# Patient Record
Sex: MALE | Race: WHITE | NOT HISPANIC OR LATINO | URBAN - METROPOLITAN AREA
[De-identification: names, ages, dates, MRNs, and addresses within clinical notes are randomized per-mention and may not be internally consistent; named-entity substitution may affect disease eponyms.]

---

## 2023-03-13 DIAGNOSIS — F41.1 GENERALIZED ANXIETY DISORDER: Primary | ICD-10-CM

## 2023-03-13 RX ORDER — CLONAZEPAM 0.5 MG/1
0.5 TABLET ORAL EVERY 12 HOURS PRN
COMMUNITY
End: 2023-03-13 | Stop reason: SDUPTHER

## 2023-03-14 RX ORDER — CLONAZEPAM 0.5 MG/1
0.5 TABLET ORAL EVERY 12 HOURS PRN
Qty: 30 TABLET | Refills: 1 | Status: SHIPPED | OUTPATIENT
Start: 2023-03-14 | End: 2023-05-09 | Stop reason: SDUPTHER

## 2023-03-28 ENCOUNTER — TELEPHONE (OUTPATIENT)
Dept: PRIMARY CARE | Facility: CLINIC | Age: 73
End: 2023-03-28
Payer: MEDICARE

## 2023-03-28 NOTE — TELEPHONE ENCOUNTER
Pt had an ultrasound  that showed kidney stone and polyps. He nephrologist on last Thursday. She offered to refer him to someone to follow up on these results. He declined this referral because he wasn't happy with her because she didn't review the result until he asked about them (Another Dr. Ordered them ). He wants a referral to a different nephr and a referral to a dr. To address the finding on the ultrasound.

## 2023-04-13 ENCOUNTER — OFFICE VISIT (OUTPATIENT)
Dept: PRIMARY CARE | Facility: CLINIC | Age: 73
End: 2023-04-13
Payer: MEDICARE

## 2023-04-13 VITALS
WEIGHT: 190 LBS | HEART RATE: 65 BPM | OXYGEN SATURATION: 97 % | BODY MASS INDEX: 28.14 KG/M2 | TEMPERATURE: 97.1 F | DIASTOLIC BLOOD PRESSURE: 62 MMHG | HEIGHT: 69 IN | SYSTOLIC BLOOD PRESSURE: 122 MMHG

## 2023-04-13 DIAGNOSIS — F41.1 GENERALIZED ANXIETY DISORDER WITH PANIC ATTACKS: Primary | ICD-10-CM

## 2023-04-13 DIAGNOSIS — I47.9 TACHYCARDIA, PAROXYSMAL (MULTI): ICD-10-CM

## 2023-04-13 DIAGNOSIS — F32.1 DEPRESSION, MAJOR, SINGLE EPISODE, MODERATE (MULTI): ICD-10-CM

## 2023-04-13 DIAGNOSIS — F41.0 GENERALIZED ANXIETY DISORDER WITH PANIC ATTACKS: Primary | ICD-10-CM

## 2023-04-13 DIAGNOSIS — N18.31 STAGE 3A CHRONIC KIDNEY DISEASE (MULTI): ICD-10-CM

## 2023-04-13 PROBLEM — J40 BRONCHITIS: Status: ACTIVE | Noted: 2023-04-13

## 2023-04-13 PROBLEM — I47.10 SVT (SUPRAVENTRICULAR TACHYCARDIA) (CMS-HCC): Status: ACTIVE | Noted: 2023-04-13

## 2023-04-13 PROBLEM — I10 HTN (HYPERTENSION), BENIGN: Status: ACTIVE | Noted: 2018-02-15

## 2023-04-13 PROBLEM — Z99.89 CPAP (CONTINUOUS POSITIVE AIRWAY PRESSURE) DEPENDENCE: Status: ACTIVE | Noted: 2018-10-11

## 2023-04-13 PROBLEM — N18.2 CKD (CHRONIC KIDNEY DISEASE), STAGE II: Status: ACTIVE | Noted: 2023-04-13

## 2023-04-13 PROBLEM — N28.9 RENAL INSUFFICIENCY: Status: ACTIVE | Noted: 2018-02-15

## 2023-04-13 PROBLEM — F32.5 DEPRESSION, MAJOR, IN REMISSION (CMS-HCC): Status: ACTIVE | Noted: 2023-04-13

## 2023-04-13 PROBLEM — H02.9 BENIGN LESION OF EYELID: Status: ACTIVE | Noted: 2018-10-11

## 2023-04-13 PROBLEM — I35.1 AORTIC VALVE REGURGITATION, ACQUIRED: Status: ACTIVE | Noted: 2023-04-13

## 2023-04-13 PROBLEM — M43.16 SPONDYLOLISTHESIS OF LUMBAR REGION: Status: ACTIVE | Noted: 2020-06-18

## 2023-04-13 PROBLEM — F98.8 ATTENTION DEFICIT DISORDER: Status: ACTIVE | Noted: 2018-02-15

## 2023-04-13 PROBLEM — H40.1131 PRIMARY OPEN ANGLE GLAUCOMA (POAG) OF BOTH EYES, MILD STAGE: Status: ACTIVE | Noted: 2018-06-19

## 2023-04-13 PROBLEM — H25.813 COMBINED FORMS OF AGE-RELATED CATARACT OF BOTH EYES: Status: ACTIVE | Noted: 2018-10-11

## 2023-04-13 PROBLEM — S63.619A: Status: ACTIVE | Noted: 2018-05-09

## 2023-04-13 PROBLEM — S82.409A CLOSED FIBULAR FRACTURE: Status: ACTIVE | Noted: 2023-04-13

## 2023-04-13 PROBLEM — R00.0 TACHYCARDIA: Status: ACTIVE | Noted: 2023-04-13

## 2023-04-13 PROBLEM — R94.31 ABNORMAL EKG: Status: ACTIVE | Noted: 2023-04-13

## 2023-04-13 PROBLEM — H25.13 NUCLEAR SENILE CATARACT OF BOTH EYES: Status: ACTIVE | Noted: 2020-12-21

## 2023-04-13 PROBLEM — H52.13 MYOPIA, BILATERAL: Status: ACTIVE | Noted: 2023-01-19

## 2023-04-13 PROBLEM — J30.89 ENVIRONMENTAL AND SEASONAL ALLERGIES: Status: ACTIVE | Noted: 2019-03-12

## 2023-04-13 PROBLEM — M79.642 LEFT HAND PAIN: Status: ACTIVE | Noted: 2018-06-22

## 2023-04-13 PROBLEM — R39.9 UTI SYMPTOMS: Status: ACTIVE | Noted: 2023-04-13

## 2023-04-13 PROBLEM — G47.33 OSA ON CPAP: Status: ACTIVE | Noted: 2018-02-15

## 2023-04-13 PROBLEM — S82.62XD: Status: ACTIVE | Noted: 2022-12-16

## 2023-04-13 PROCEDURE — 1159F MED LIST DOCD IN RCRD: CPT | Performed by: INTERNAL MEDICINE

## 2023-04-13 PROCEDURE — 99213 OFFICE O/P EST LOW 20 MIN: CPT | Performed by: INTERNAL MEDICINE

## 2023-04-13 PROCEDURE — 3074F SYST BP LT 130 MM HG: CPT | Performed by: INTERNAL MEDICINE

## 2023-04-13 PROCEDURE — 1160F RVW MEDS BY RX/DR IN RCRD: CPT | Performed by: INTERNAL MEDICINE

## 2023-04-13 PROCEDURE — 3078F DIAST BP <80 MM HG: CPT | Performed by: INTERNAL MEDICINE

## 2023-04-13 RX ORDER — LISINOPRIL 20 MG/1
20 TABLET ORAL
COMMUNITY
End: 2023-08-01 | Stop reason: ALTCHOICE

## 2023-04-13 RX ORDER — AMLODIPINE BESYLATE 2.5 MG/1
2.5 TABLET ORAL DAILY
COMMUNITY
Start: 2016-03-04 | End: 2023-07-25 | Stop reason: ALTCHOICE

## 2023-04-13 RX ORDER — ASPIRIN 81 MG/1
81 TABLET ORAL DAILY
COMMUNITY
Start: 2023-02-21 | End: 2023-11-11 | Stop reason: WASHOUT

## 2023-04-13 RX ORDER — CETIRIZINE HYDROCHLORIDE 10 MG/1
10 TABLET ORAL DAILY
COMMUNITY
Start: 2023-02-21 | End: 2023-11-11 | Stop reason: ALTCHOICE

## 2023-04-13 RX ORDER — LISINOPRIL 30 MG/1
30 TABLET ORAL DAILY
COMMUNITY
Start: 2019-03-21 | End: 2023-08-01 | Stop reason: SDUPTHER

## 2023-04-13 RX ORDER — ASCORBIC ACID 500 MG
500 TABLET ORAL DAILY
COMMUNITY
Start: 2023-02-21 | End: 2023-11-11 | Stop reason: ALTCHOICE

## 2023-04-13 RX ORDER — GABAPENTIN 100 MG/1
100 CAPSULE ORAL
COMMUNITY
Start: 2022-10-05 | End: 2023-07-25 | Stop reason: ALTCHOICE

## 2023-04-13 RX ORDER — DEXTROAMPHETAMINE SACCHARATE, AMPHETAMINE ASPARTATE, DEXTROAMPHETAMINE SULFATE AND AMPHETAMINE SULFATE 7.5; 7.5; 7.5; 7.5 MG/1; MG/1; MG/1; MG/1
30 TABLET ORAL DAILY
COMMUNITY
Start: 2022-10-11 | End: 2023-04-13 | Stop reason: SINTOL

## 2023-04-13 RX ORDER — METOPROLOL SUCCINATE 25 MG/1
25 TABLET, EXTENDED RELEASE ORAL DAILY
COMMUNITY
Start: 2023-02-21 | End: 2024-02-06 | Stop reason: SDUPTHER

## 2023-04-13 RX ORDER — TRAZODONE HYDROCHLORIDE 150 MG/1
150 TABLET ORAL NIGHTLY
COMMUNITY
Start: 2022-05-05 | End: 2024-02-06 | Stop reason: SDUPTHER

## 2023-04-13 RX ORDER — LATANOPROST 50 UG/ML
1 SOLUTION/ DROPS OPHTHALMIC DAILY
COMMUNITY
Start: 2022-03-14

## 2023-04-13 RX ORDER — BUPROPION HYDROCHLORIDE 150 MG/1
150 TABLET ORAL
COMMUNITY
End: 2023-07-25 | Stop reason: ALTCHOICE

## 2023-04-13 RX ORDER — OMEGA-3/DHA/EPA/FISH OIL 300-1000MG
CAPSULE,DELAYED RELEASE (ENTERIC COATED) ORAL
COMMUNITY
Start: 2023-02-21 | End: 2023-11-11 | Stop reason: WASHOUT

## 2023-04-13 RX ORDER — SILDENAFIL CITRATE 20 MG/1
20 TABLET ORAL
COMMUNITY
Start: 2018-10-30 | End: 2024-03-25 | Stop reason: ALTCHOICE

## 2023-04-13 NOTE — PROGRESS NOTES
"Subjective   Patient ID: Paul Prather is a 72 y.o. male who presents for Follow-up.    HPI follow up add, nilsa, pat, ckd, dep, nephrolithiasis, htn, ed    Review of Systems  No cardiac sxs. Per pt, cardio said ok to take add rx. No letter available. Pt getting by w/o but feels was dramatically better on meds. Depression ok. Had breakdown in dpr w/ nephrologist and would like referral to new doctor. No kidney stone sxs.  Objective   /62   Pulse 65   Temp 36.2 °C (97.1 °F)   Ht 1.753 m (5' 9\")   Wt 86.2 kg (190 lb)   SpO2 97%   BMI 28.06 kg/m²     Physical Exam  GEN nad, Affect wnl  Heent ncat, eomfg, face symmetric  Skin good color  Resp breathing easily  No p/m retardation or agitation  Thinking appropriate  Oriented    Assessment/Plan   Generalized anxiety disorder with panic attacks  Stage 3a chronic kidney disease  Depression, major, single episode, moderate (CMS/HCC)  Tachycardia, paroxysmal (CMS/HCC)    Pull cardio note for review  Discussed reluctance to give add meds  Follow up based on cardio note or 3 mos     "

## 2023-04-18 ENCOUNTER — TELEPHONE (OUTPATIENT)
Dept: PRIMARY CARE | Facility: CLINIC | Age: 73
End: 2023-04-18
Payer: MEDICARE

## 2023-04-18 NOTE — TELEPHONE ENCOUNTER
----- Message from Aayush Lopez MD sent at 4/14/2023 11:19 AM EDT -----  SABRINA ogden  ----- Message -----  From: Mai Santos MA  Sent: 4/13/2023   5:08 PM EDT  To: Aayush Lopez MD    Who?   ----- Message -----  From: Aayush Lopez MD  Sent: 4/13/2023   4:58 PM EDT  To: KECIA Rouse ramicone note cardio please

## 2023-05-02 ENCOUNTER — TELEPHONE (OUTPATIENT)
Dept: PRIMARY CARE | Facility: CLINIC | Age: 73
End: 2023-05-02
Payer: MEDICARE

## 2023-05-02 NOTE — TELEPHONE ENCOUNTER
Pt is requesting a refill on his add meds. He said you discontinued it because you were concerned about his heart but that has been resolved so he would like to restart it. He's looking for the dosing of 1 every morning.

## 2023-05-04 DIAGNOSIS — F98.8 ATTENTION DEFICIT DISORDER, UNSPECIFIED HYPERACTIVITY PRESENCE: Primary | ICD-10-CM

## 2023-05-04 RX ORDER — DEXTROAMPHETAMINE SACCHARATE, AMPHETAMINE ASPARTATE MONOHYDRATE, DEXTROAMPHETAMINE SULFATE AND AMPHETAMINE SULFATE 1.25; 1.25; 1.25; 1.25 MG/1; MG/1; MG/1; MG/1
5 CAPSULE, EXTENDED RELEASE ORAL EVERY MORNING
Qty: 30 CAPSULE | Refills: 0 | Status: SHIPPED | OUTPATIENT
Start: 2023-05-04 | End: 2023-06-02 | Stop reason: SDUPTHER

## 2023-05-04 NOTE — PROGRESS NOTES
Cardio letter reviewed  They ok'd amphet/dext  Will start low dose xr at pt's persistent urging  Have multiple times discussed risks  Pt accepts  Also will have pt clear w/ eye doctor bf taking

## 2023-05-09 DIAGNOSIS — F41.1 GENERALIZED ANXIETY DISORDER: ICD-10-CM

## 2023-05-09 RX ORDER — CLONAZEPAM 0.5 MG/1
0.5 TABLET ORAL EVERY 12 HOURS PRN
Qty: 30 TABLET | Refills: 0 | Status: SHIPPED | OUTPATIENT
Start: 2023-05-09 | End: 2023-06-07 | Stop reason: SDUPTHER

## 2023-05-18 DIAGNOSIS — F98.8 ATTENTION DEFICIT DISORDER, UNSPECIFIED HYPERACTIVITY PRESENCE: ICD-10-CM

## 2023-05-18 DIAGNOSIS — F41.1 GENERALIZED ANXIETY DISORDER: ICD-10-CM

## 2023-05-18 RX ORDER — VENLAFAXINE HYDROCHLORIDE 150 MG/1
1 CAPSULE, EXTENDED RELEASE ORAL DAILY
COMMUNITY
Start: 2023-02-01 | End: 2023-06-02 | Stop reason: SDUPTHER

## 2023-05-19 RX ORDER — CLONAZEPAM 0.5 MG/1
0.5 TABLET ORAL EVERY 12 HOURS PRN
Qty: 30 TABLET | Refills: 0 | OUTPATIENT
Start: 2023-05-19

## 2023-05-19 RX ORDER — DEXTROAMPHETAMINE SACCHARATE, AMPHETAMINE ASPARTATE MONOHYDRATE, DEXTROAMPHETAMINE SULFATE AND AMPHETAMINE SULFATE 1.25; 1.25; 1.25; 1.25 MG/1; MG/1; MG/1; MG/1
5 CAPSULE, EXTENDED RELEASE ORAL EVERY MORNING
Qty: 30 CAPSULE | Refills: 0 | OUTPATIENT
Start: 2023-05-19 | End: 2023-06-18

## 2023-05-19 RX ORDER — VENLAFAXINE HYDROCHLORIDE 150 MG/1
150 CAPSULE, EXTENDED RELEASE ORAL DAILY
Qty: 90 CAPSULE | Refills: 3 | OUTPATIENT
Start: 2023-05-19

## 2023-06-02 DIAGNOSIS — F98.8 ATTENTION DEFICIT DISORDER, UNSPECIFIED HYPERACTIVITY PRESENCE: ICD-10-CM

## 2023-06-02 DIAGNOSIS — F41.9 ANXIETY AND DEPRESSION: Primary | ICD-10-CM

## 2023-06-02 DIAGNOSIS — F32.A ANXIETY AND DEPRESSION: Primary | ICD-10-CM

## 2023-06-02 RX ORDER — VENLAFAXINE HYDROCHLORIDE 150 MG/1
150 CAPSULE, EXTENDED RELEASE ORAL DAILY
Qty: 90 CAPSULE | Refills: 3 | Status: SHIPPED | OUTPATIENT
Start: 2023-06-02 | End: 2023-06-06 | Stop reason: SDUPTHER

## 2023-06-02 RX ORDER — DEXTROAMPHETAMINE SACCHARATE, AMPHETAMINE ASPARTATE MONOHYDRATE, DEXTROAMPHETAMINE SULFATE AND AMPHETAMINE SULFATE 1.25; 1.25; 1.25; 1.25 MG/1; MG/1; MG/1; MG/1
5 CAPSULE, EXTENDED RELEASE ORAL EVERY MORNING
Qty: 30 CAPSULE | Refills: 0 | Status: SHIPPED | OUTPATIENT
Start: 2023-06-02 | End: 2023-06-07 | Stop reason: SDUPTHER

## 2023-06-06 DIAGNOSIS — F32.A ANXIETY AND DEPRESSION: ICD-10-CM

## 2023-06-06 DIAGNOSIS — F41.9 ANXIETY AND DEPRESSION: ICD-10-CM

## 2023-06-06 DIAGNOSIS — F98.8 ATTENTION DEFICIT DISORDER, UNSPECIFIED HYPERACTIVITY PRESENCE: ICD-10-CM

## 2023-06-06 DIAGNOSIS — F41.1 GENERALIZED ANXIETY DISORDER: ICD-10-CM

## 2023-06-07 DIAGNOSIS — F98.8 ATTENTION DEFICIT DISORDER, UNSPECIFIED HYPERACTIVITY PRESENCE: ICD-10-CM

## 2023-06-07 RX ORDER — VENLAFAXINE HYDROCHLORIDE 150 MG/1
150 CAPSULE, EXTENDED RELEASE ORAL DAILY
Qty: 90 CAPSULE | Refills: 3 | Status: SHIPPED | OUTPATIENT
Start: 2023-06-07 | End: 2024-06-03 | Stop reason: SDUPTHER

## 2023-06-07 RX ORDER — DEXTROAMPHETAMINE SACCHARATE, AMPHETAMINE ASPARTATE MONOHYDRATE, DEXTROAMPHETAMINE SULFATE AND AMPHETAMINE SULFATE 1.25; 1.25; 1.25; 1.25 MG/1; MG/1; MG/1; MG/1
5 CAPSULE, EXTENDED RELEASE ORAL EVERY MORNING
Qty: 30 CAPSULE | Refills: 0 | Status: SHIPPED | OUTPATIENT
Start: 2023-06-07 | End: 2023-07-10 | Stop reason: SDUPTHER

## 2023-06-07 RX ORDER — CLONAZEPAM 0.5 MG/1
0.5 TABLET ORAL EVERY 12 HOURS PRN
Qty: 30 TABLET | Refills: 0 | Status: SHIPPED | OUTPATIENT
Start: 2023-06-07 | End: 2023-07-10 | Stop reason: SDUPTHER

## 2023-06-21 ENCOUNTER — TELEPHONE (OUTPATIENT)
Dept: PRIMARY CARE | Facility: CLINIC | Age: 73
End: 2023-06-21
Payer: MEDICARE

## 2023-06-21 DIAGNOSIS — M25.569 KNEE PAIN, UNSPECIFIED CHRONICITY, UNSPECIFIED LATERALITY: Primary | ICD-10-CM

## 2023-07-10 DIAGNOSIS — F98.8 ATTENTION DEFICIT DISORDER, UNSPECIFIED HYPERACTIVITY PRESENCE: ICD-10-CM

## 2023-07-10 DIAGNOSIS — F41.1 GENERALIZED ANXIETY DISORDER: ICD-10-CM

## 2023-07-10 RX ORDER — DEXTROAMPHETAMINE SACCHARATE, AMPHETAMINE ASPARTATE MONOHYDRATE, DEXTROAMPHETAMINE SULFATE AND AMPHETAMINE SULFATE 1.25; 1.25; 1.25; 1.25 MG/1; MG/1; MG/1; MG/1
5 CAPSULE, EXTENDED RELEASE ORAL EVERY MORNING
Qty: 30 CAPSULE | Refills: 0 | Status: SHIPPED | OUTPATIENT
Start: 2023-07-10 | End: 2023-08-07 | Stop reason: SDUPTHER

## 2023-07-10 RX ORDER — CLONAZEPAM 0.5 MG/1
0.5 TABLET ORAL EVERY 12 HOURS PRN
Qty: 30 TABLET | Refills: 0 | Status: SHIPPED | OUTPATIENT
Start: 2023-07-10 | End: 2023-08-07 | Stop reason: SDUPTHER

## 2023-07-25 ENCOUNTER — OFFICE VISIT (OUTPATIENT)
Dept: PRIMARY CARE | Facility: CLINIC | Age: 73
End: 2023-07-25
Payer: MEDICARE

## 2023-07-25 VITALS
HEIGHT: 69 IN | OXYGEN SATURATION: 96 % | TEMPERATURE: 97.3 F | WEIGHT: 191 LBS | DIASTOLIC BLOOD PRESSURE: 72 MMHG | BODY MASS INDEX: 28.29 KG/M2 | HEART RATE: 71 BPM | SYSTOLIC BLOOD PRESSURE: 122 MMHG

## 2023-07-25 DIAGNOSIS — Z00.00 ROUTINE GENERAL MEDICAL EXAMINATION AT HEALTH CARE FACILITY: Primary | ICD-10-CM

## 2023-07-25 DIAGNOSIS — R53.83 OTHER FATIGUE: ICD-10-CM

## 2023-07-25 DIAGNOSIS — Z12.5 SCREENING PSA (PROSTATE SPECIFIC ANTIGEN): ICD-10-CM

## 2023-07-25 DIAGNOSIS — M54.30 SCIATICA, UNSPECIFIED LATERALITY: ICD-10-CM

## 2023-07-25 DIAGNOSIS — M25.569 KNEE PAIN, UNSPECIFIED CHRONICITY, UNSPECIFIED LATERALITY: ICD-10-CM

## 2023-07-25 DIAGNOSIS — N18.2 CKD (CHRONIC KIDNEY DISEASE), STAGE II: ICD-10-CM

## 2023-07-25 DIAGNOSIS — M54.9 BACK PAIN, UNSPECIFIED BACK LOCATION, UNSPECIFIED BACK PAIN LATERALITY, UNSPECIFIED CHRONICITY: ICD-10-CM

## 2023-07-25 PROBLEM — M17.12 ARTHRITIS OF KNEE, LEFT: Status: RESOLVED | Noted: 2023-07-25 | Resolved: 2023-07-25

## 2023-07-25 PROBLEM — S83.242A TEAR OF MEDIAL MENISCUS OF LEFT KNEE, CURRENT: Status: RESOLVED | Noted: 2023-07-25 | Resolved: 2023-07-25

## 2023-07-25 PROCEDURE — G0439 PPPS, SUBSEQ VISIT: HCPCS | Performed by: INTERNAL MEDICINE

## 2023-07-25 PROCEDURE — 99214 OFFICE O/P EST MOD 30 MIN: CPT | Performed by: INTERNAL MEDICINE

## 2023-07-25 PROCEDURE — 3078F DIAST BP <80 MM HG: CPT | Performed by: INTERNAL MEDICINE

## 2023-07-25 PROCEDURE — 1170F FXNL STATUS ASSESSED: CPT | Performed by: INTERNAL MEDICINE

## 2023-07-25 PROCEDURE — 1159F MED LIST DOCD IN RCRD: CPT | Performed by: INTERNAL MEDICINE

## 2023-07-25 PROCEDURE — 1160F RVW MEDS BY RX/DR IN RCRD: CPT | Performed by: INTERNAL MEDICINE

## 2023-07-25 PROCEDURE — 3074F SYST BP LT 130 MM HG: CPT | Performed by: INTERNAL MEDICINE

## 2023-07-25 RX ORDER — FLUTICASONE PROPIONATE 50 MCG
1 SPRAY, SUSPENSION (ML) NASAL DAILY
COMMUNITY

## 2023-07-25 RX ORDER — LATANOPROST 50 UG/ML
1 SOLUTION/ DROPS OPHTHALMIC
COMMUNITY
Start: 2022-03-14 | End: 2024-03-25 | Stop reason: SDUPTHER

## 2023-07-25 ASSESSMENT — ACTIVITIES OF DAILY LIVING (ADL)
DOING_HOUSEWORK: INDEPENDENT
DRESSING: INDEPENDENT
TAKING_MEDICATION: INDEPENDENT
GROCERY_SHOPPING: INDEPENDENT
MANAGING_FINANCES: INDEPENDENT
BATHING: INDEPENDENT

## 2023-07-25 ASSESSMENT — ENCOUNTER SYMPTOMS
LOSS OF SENSATION IN FEET: 0
OCCASIONAL FEELINGS OF UNSTEADINESS: 0
DEPRESSION: 0

## 2023-07-25 ASSESSMENT — PATIENT HEALTH QUESTIONNAIRE - PHQ9
SUM OF ALL RESPONSES TO PHQ9 QUESTIONS 1 AND 2: 0
2. FEELING DOWN, DEPRESSED OR HOPELESS: NOT AT ALL
1. LITTLE INTEREST OR PLEASURE IN DOING THINGS: NOT AT ALL

## 2023-07-25 NOTE — PROGRESS NOTES
"Subjective   Reason for Visit: Paul Prather is an 73 y.o. male here for a Medicare Wellness visit.     Past Medical, Surgical, and Family History reviewed and updated in chart.    Reviewed all medications by prescribing practitioner or clinical pharmacist (such as prescriptions, OTCs, herbal therapies and supplements) and documented in the medical record.    HPI multiple issues. Left knee pain x 5 weeks. Occasional edema. Left > right. C/o fatigue. Some low back pain and left foot intermittent paresthesias. Follow up htn, nilsa, add    Patient Care Team:  Aayush Lopez MD as PCP - General  Aayush Lopez MD as PCP - [employer] PCP     Review of Systems  As per [facility]    Objective   Vitals:  /72   Pulse 71   Temp 36.3 °C (97.3 °F)   Ht 1.753 m (5' 9\")   Wt 86.6 kg (191 lb)   SpO2 96%   BMI 28.21 kg/m²       Physical Exam  Gen nad, affect wnl  Heentt eomfg, face symmetric, ncat  Neck w/o la, tm, bruit  Lungs clear   Cv rrr nl s1, s2  Ext w/o edema  Neuro grossly nonfocal  Skin good color  Knee rom ok. Ligaments ok. Some medial jt line tenderness    Assessment/Plan   Medicare wellness  Sciatica  Knee pain  Htn  Edema. Minimal  Fatigue    Lab  Refer back ortho for knee  Refer p.t. sciatica  Follow up 3 mos                 "

## 2023-08-01 DIAGNOSIS — I10 HTN (HYPERTENSION), BENIGN: Primary | ICD-10-CM

## 2023-08-01 RX ORDER — LISINOPRIL 30 MG/1
30 TABLET ORAL DAILY
Qty: 90 TABLET | Refills: 3 | Status: SHIPPED | OUTPATIENT
Start: 2023-08-01

## 2023-08-07 ENCOUNTER — LAB (OUTPATIENT)
Dept: LAB | Facility: LAB | Age: 73
End: 2023-08-07
Payer: MEDICARE

## 2023-08-07 DIAGNOSIS — M54.30 SCIATICA, UNSPECIFIED LATERALITY: ICD-10-CM

## 2023-08-07 DIAGNOSIS — M54.9 BACK PAIN, UNSPECIFIED BACK LOCATION, UNSPECIFIED BACK PAIN LATERALITY, UNSPECIFIED CHRONICITY: ICD-10-CM

## 2023-08-07 DIAGNOSIS — F41.1 GENERALIZED ANXIETY DISORDER: ICD-10-CM

## 2023-08-07 DIAGNOSIS — R53.83 OTHER FATIGUE: ICD-10-CM

## 2023-08-07 DIAGNOSIS — Z12.5 SCREENING PSA (PROSTATE SPECIFIC ANTIGEN): ICD-10-CM

## 2023-08-07 DIAGNOSIS — F98.8 ATTENTION DEFICIT DISORDER, UNSPECIFIED HYPERACTIVITY PRESENCE: ICD-10-CM

## 2023-08-07 PROCEDURE — 85025 COMPLETE CBC W/AUTO DIFF WBC: CPT

## 2023-08-07 PROCEDURE — 36415 COLL VENOUS BLD VENIPUNCTURE: CPT

## 2023-08-07 PROCEDURE — 84165 PROTEIN E-PHORESIS SERUM: CPT | Performed by: PATHOLOGY

## 2023-08-07 PROCEDURE — 84443 ASSAY THYROID STIM HORMONE: CPT

## 2023-08-07 PROCEDURE — 82607 VITAMIN B-12: CPT

## 2023-08-07 PROCEDURE — 80053 COMPREHEN METABOLIC PANEL: CPT

## 2023-08-07 PROCEDURE — 84165 PROTEIN E-PHORESIS SERUM: CPT

## 2023-08-07 PROCEDURE — G0103 PSA SCREENING: HCPCS

## 2023-08-07 PROCEDURE — 81001 URINALYSIS AUTO W/SCOPE: CPT

## 2023-08-08 LAB
ALANINE AMINOTRANSFERASE (SGPT) (U/L) IN SER/PLAS: 25 U/L (ref 10–52)
ALBUMIN (G/DL) IN SER/PLAS: 4.2 G/DL (ref 3.4–5)
ALKALINE PHOSPHATASE (U/L) IN SER/PLAS: 71 U/L (ref 33–136)
ANION GAP IN SER/PLAS: 13 MMOL/L (ref 10–20)
APPEARANCE, URINE: CLEAR
ASPARTATE AMINOTRANSFERASE (SGOT) (U/L) IN SER/PLAS: 25 U/L (ref 9–39)
BASOPHILS (10*3/UL) IN BLOOD BY AUTOMATED COUNT: 0.02 X10E9/L (ref 0–0.1)
BASOPHILS/100 LEUKOCYTES IN BLOOD BY AUTOMATED COUNT: 0.2 % (ref 0–2)
BILIRUBIN TOTAL (MG/DL) IN SER/PLAS: 0.4 MG/DL (ref 0–1.2)
BILIRUBIN, URINE: NEGATIVE
BLOOD, URINE: NEGATIVE
CALCIUM (MG/DL) IN SER/PLAS: 9.4 MG/DL (ref 8.6–10.6)
CARBON DIOXIDE, TOTAL (MMOL/L) IN SER/PLAS: 29 MMOL/L (ref 21–32)
CHLORIDE (MMOL/L) IN SER/PLAS: 104 MMOL/L (ref 98–107)
COBALAMIN (VITAMIN B12) (PG/ML) IN SER/PLAS: 509 PG/ML (ref 211–911)
COLOR, URINE: YELLOW
CREATININE (MG/DL) IN SER/PLAS: 1.55 MG/DL (ref 0.5–1.3)
EOSINOPHILS (10*3/UL) IN BLOOD BY AUTOMATED COUNT: 0.08 X10E9/L (ref 0–0.4)
EOSINOPHILS/100 LEUKOCYTES IN BLOOD BY AUTOMATED COUNT: 0.9 % (ref 0–6)
ERYTHROCYTE DISTRIBUTION WIDTH (RATIO) BY AUTOMATED COUNT: 12.7 % (ref 11.5–14.5)
ERYTHROCYTE MEAN CORPUSCULAR HEMOGLOBIN CONCENTRATION (G/DL) BY AUTOMATED: 32.7 G/DL (ref 32–36)
ERYTHROCYTE MEAN CORPUSCULAR VOLUME (FL) BY AUTOMATED COUNT: 99 FL (ref 80–100)
ERYTHROCYTES (10*6/UL) IN BLOOD BY AUTOMATED COUNT: 4.63 X10E12/L (ref 4.5–5.9)
GFR MALE: 47 ML/MIN/1.73M2
GLUCOSE (MG/DL) IN SER/PLAS: 121 MG/DL (ref 74–99)
GLUCOSE, URINE: NEGATIVE MG/DL
HEMATOCRIT (%) IN BLOOD BY AUTOMATED COUNT: 45.9 % (ref 41–52)
HEMOGLOBIN (G/DL) IN BLOOD: 15 G/DL (ref 13.5–17.5)
IMMATURE GRANULOCYTES/100 LEUKOCYTES IN BLOOD BY AUTOMATED COUNT: 0.6 % (ref 0–0.9)
KETONES, URINE: NEGATIVE MG/DL
LEUKOCYTE ESTERASE, URINE: NEGATIVE
LEUKOCYTES (10*3/UL) IN BLOOD BY AUTOMATED COUNT: 9.3 X10E9/L (ref 4.4–11.3)
LYMPHOCYTES (10*3/UL) IN BLOOD BY AUTOMATED COUNT: 1.54 X10E9/L (ref 0.8–3)
LYMPHOCYTES/100 LEUKOCYTES IN BLOOD BY AUTOMATED COUNT: 16.6 % (ref 13–44)
MONOCYTES (10*3/UL) IN BLOOD BY AUTOMATED COUNT: 0.55 X10E9/L (ref 0.05–0.8)
MONOCYTES/100 LEUKOCYTES IN BLOOD BY AUTOMATED COUNT: 5.9 % (ref 2–10)
MUCUS, URINE: NORMAL /LPF
NEUTROPHILS (10*3/UL) IN BLOOD BY AUTOMATED COUNT: 7.04 X10E9/L (ref 1.6–5.5)
NEUTROPHILS/100 LEUKOCYTES IN BLOOD BY AUTOMATED COUNT: 75.8 % (ref 40–80)
NITRITE, URINE: NEGATIVE
NRBC (PER 100 WBCS) BY AUTOMATED COUNT: 0 /100 WBC (ref 0–0)
PH, URINE: 5 (ref 5–8)
PLATELETS (10*3/UL) IN BLOOD AUTOMATED COUNT: 193 X10E9/L (ref 150–450)
POTASSIUM (MMOL/L) IN SER/PLAS: 4.7 MMOL/L (ref 3.5–5.3)
PROSTATE SPECIFIC AG (NG/ML) IN SER/PLAS: 0.36 NG/ML (ref 0–4)
PROTEIN TOTAL: 6.8 G/DL (ref 6.4–8.2)
PROTEIN, URINE: ABNORMAL MG/DL
RBC, URINE: 4 /HPF (ref 0–5)
SODIUM (MMOL/L) IN SER/PLAS: 141 MMOL/L (ref 136–145)
SPECIFIC GRAVITY, URINE: 1.02 (ref 1–1.03)
SQUAMOUS EPITHELIAL CELLS, URINE: <1 /HPF
THYROTROPIN (MIU/L) IN SER/PLAS BY DETECTION LIMIT <= 0.05 MIU/L: 1.98 MIU/L (ref 0.44–3.98)
UREA NITROGEN (MG/DL) IN SER/PLAS: 26 MG/DL (ref 6–23)
UROBILINOGEN, URINE: <2 MG/DL (ref 0–1.9)
WBC, URINE: 1 /HPF (ref 0–5)

## 2023-08-08 RX ORDER — DEXTROAMPHETAMINE SACCHARATE, AMPHETAMINE ASPARTATE MONOHYDRATE, DEXTROAMPHETAMINE SULFATE AND AMPHETAMINE SULFATE 1.25; 1.25; 1.25; 1.25 MG/1; MG/1; MG/1; MG/1
5 CAPSULE, EXTENDED RELEASE ORAL EVERY MORNING
Qty: 30 CAPSULE | Refills: 0 | Status: SHIPPED | OUTPATIENT
Start: 2023-08-08 | End: 2023-09-18 | Stop reason: SDUPTHER

## 2023-08-08 RX ORDER — CLONAZEPAM 0.5 MG/1
0.5 TABLET ORAL EVERY 12 HOURS PRN
Qty: 30 TABLET | Refills: 0 | Status: SHIPPED | OUTPATIENT
Start: 2023-08-08 | End: 2023-09-08 | Stop reason: SDUPTHER

## 2023-08-09 LAB
ALBUMIN ELP: 4 G/DL (ref 3.4–5)
ALPHA 1: 0.3 G/DL (ref 0.2–0.6)
ALPHA 2: 0.7 G/DL (ref 0.4–1.1)
BETA: 0.8 G/DL (ref 0.5–1.2)
GAMMA GLOBULIN: 0.9 G/DL (ref 0.5–1.4)
PATH REVIEW-SERUM PROTEIN ELECTROPHORESIS: NORMAL
PROTEIN ELECTROPHORESIS INTERPRETATION: NORMAL
PROTEIN TOTAL: 6.8 G/DL (ref 6.4–8.2)

## 2023-09-07 ENCOUNTER — LAB (OUTPATIENT)
Dept: LAB | Facility: LAB | Age: 73
End: 2023-09-07
Payer: MEDICARE

## 2023-09-07 ENCOUNTER — OFFICE VISIT (OUTPATIENT)
Dept: PRIMARY CARE | Facility: CLINIC | Age: 73
End: 2023-09-07
Payer: MEDICARE

## 2023-09-07 VITALS
OXYGEN SATURATION: 99 % | WEIGHT: 197 LBS | HEART RATE: 70 BPM | SYSTOLIC BLOOD PRESSURE: 110 MMHG | DIASTOLIC BLOOD PRESSURE: 80 MMHG | BODY MASS INDEX: 29.18 KG/M2 | HEIGHT: 69 IN

## 2023-09-07 DIAGNOSIS — I35.1 AORTIC VALVE REGURGITATION, ACQUIRED: ICD-10-CM

## 2023-09-07 DIAGNOSIS — N18.2 CKD (CHRONIC KIDNEY DISEASE), STAGE II: ICD-10-CM

## 2023-09-07 DIAGNOSIS — Z01.818 PREOPERATIVE CLEARANCE: ICD-10-CM

## 2023-09-07 DIAGNOSIS — Z01.818 PREOPERATIVE CLEARANCE: Primary | ICD-10-CM

## 2023-09-07 PROBLEM — M54.50 LOW BACK PAIN: Status: ACTIVE | Noted: 2023-09-07

## 2023-09-07 PROBLEM — S83.231A COMPLEX TEAR OF MEDIAL MENISCUS OF RIGHT KNEE AS CURRENT INJURY: Status: ACTIVE | Noted: 2023-09-07

## 2023-09-07 PROBLEM — R31.9 HEMATURIA SYNDROME: Status: ACTIVE | Noted: 2023-09-07

## 2023-09-07 PROBLEM — M54.30 ACUTE SCIATICA: Status: ACTIVE | Noted: 2023-09-07

## 2023-09-07 PROBLEM — M25.561 RIGHT KNEE PAIN: Status: ACTIVE | Noted: 2023-09-07

## 2023-09-07 PROBLEM — S83.231 COMPLEX TEAR OF MEDIAL MENISCUS OF RIGHT KNEE AS CURRENT INJURY: Status: ACTIVE | Noted: 2023-09-07

## 2023-09-07 PROCEDURE — 93000 ELECTROCARDIOGRAM COMPLETE: CPT | Performed by: NURSE PRACTITIONER

## 2023-09-07 PROCEDURE — 1160F RVW MEDS BY RX/DR IN RCRD: CPT | Performed by: NURSE PRACTITIONER

## 2023-09-07 PROCEDURE — 99214 OFFICE O/P EST MOD 30 MIN: CPT | Performed by: NURSE PRACTITIONER

## 2023-09-07 PROCEDURE — 1159F MED LIST DOCD IN RCRD: CPT | Performed by: NURSE PRACTITIONER

## 2023-09-07 PROCEDURE — 36415 COLL VENOUS BLD VENIPUNCTURE: CPT

## 2023-09-07 PROCEDURE — 3079F DIAST BP 80-89 MM HG: CPT | Performed by: NURSE PRACTITIONER

## 2023-09-07 PROCEDURE — 80053 COMPREHEN METABOLIC PANEL: CPT

## 2023-09-07 PROCEDURE — 1125F AMNT PAIN NOTED PAIN PRSNT: CPT | Performed by: NURSE PRACTITIONER

## 2023-09-07 PROCEDURE — 85025 COMPLETE CBC W/AUTO DIFF WBC: CPT

## 2023-09-07 PROCEDURE — 3074F SYST BP LT 130 MM HG: CPT | Performed by: NURSE PRACTITIONER

## 2023-09-07 ASSESSMENT — ENCOUNTER SYMPTOMS
CONSTIPATION: 1
NEUROLOGICAL NEGATIVE: 1
ENDOCRINE NEGATIVE: 1
RESPIRATORY NEGATIVE: 1
BACK PAIN: 1
PSYCHIATRIC NEGATIVE: 1
HEMATOLOGIC/LYMPHATIC NEGATIVE: 1
SLEEP DISTURBANCE: 0
FATIGUE: 1

## 2023-09-07 ASSESSMENT — PAIN SCALES - GENERAL: PAINLEVEL: 1

## 2023-09-07 NOTE — PATIENT INSTRUCTIONS
Labs and chest x-ray ordered-> these results will be communicated to you,  EKG today will be faxed to your cardiologist, they will determine surgical clearance from cardiology standpoint, they may want you seen prior to your surgery to determine surgical clearance.   Discontinue use of ASA and supplements at least 7 days prior to surgery.

## 2023-09-07 NOTE — PROGRESS NOTES
"Subjective   Patient ID: Paul Prather is a 73 y.o. male who presents for Pre-op Exam (Surgical clearance/Dr Azael Preciado- Ortho/L knee surgery 9/14/23).    HPI   Patient of Dr. Lopez here for surgical clearance for left knee arthroscopy scheduled 09/14/2023 at [facility] with Dr. Azael Preciado.  Preadmission testing will be scheduling with him.  Current concerns  Chronic Concerns: Aortic valve regurgitation, ADD, CKD, FRANCY on CPAP, Hematuria syndrome, General Anxiety disorder,   Specialist   - Cardiology- Dr. Ramicone- scheduled in October   - Nephrologist- Dr Limon -> new provider Dr Becerra.   - Ophthalmology   labs :   Review of Systems   Constitutional:  Positive for fatigue.   HENT:  Positive for congestion.    Eyes:         Wears glasses   Respiratory: Negative.     Cardiovascular:  Positive for leg swelling (slightly more than baseline).   Gastrointestinal:  Positive for constipation.   Endocrine: Negative.    Genitourinary: Negative.    Musculoskeletal:  Positive for back pain.        Currently in PT for back pain   Skin: Negative.    Allergic/Immunologic: Positive for environmental allergies.   Neurological: Negative.         Numbness in toes in morning and evening.    Hematological: Negative.    Psychiatric/Behavioral: Negative.  Negative for sleep disturbance (cpap for FRANCY).        Objective   /80 (BP Location: Right arm, Patient Position: Sitting, BP Cuff Size: Adult)   Pulse 70   Ht 1.753 m (5' 9\")   Wt 89.4 kg (197 lb)   SpO2 99%   BMI 29.09 kg/m²     Physical Exam  Vitals reviewed.   Constitutional:       Appearance: Normal appearance.   HENT:      Right Ear: Tympanic membrane and ear canal normal.      Left Ear: Tympanic membrane and ear canal normal.      Nose: Nose normal.      Mouth/Throat:      Lips: Pink.      Pharynx: Oropharynx is clear.   Eyes:      General: Lids are normal.      Extraocular Movements: Extraocular movements intact.      Conjunctiva/sclera: Conjunctivae " normal.   Neck:      Thyroid: No thyromegaly.      Vascular: No carotid bruit.   Cardiovascular:      Rate and Rhythm: Normal rate and regular rhythm.      Pulses:           Dorsalis pedis pulses are 2+ on the right side and 2+ on the left side.      Heart sounds: Normal heart sounds.   Pulmonary:      Breath sounds: Normal breath sounds. No decreased breath sounds, wheezing or rhonchi.   Abdominal:      General: Bowel sounds are normal.      Palpations: Abdomen is soft.      Tenderness: There is no abdominal tenderness.   Musculoskeletal:      Cervical back: Normal range of motion and neck supple.      Right lower leg: No edema.      Left lower leg: No edema.   Lymphadenopathy:      Cervical: No cervical adenopathy.   Skin:     General: Skin is warm.   Neurological:      Mental Status: He is alert.      Cranial Nerves: Cranial nerves 2-12 are intact.      Motor: Motor function is intact.      Coordination: Coordination is intact.      Gait: Gait is intact.      Deep Tendon Reflexes:      Reflex Scores:       Brachioradialis reflexes are 2+ on the right side and 2+ on the left side.       Patellar reflexes are 2+ on the right side and 2+ on the left side.  Psychiatric:         Attention and Perception: Attention normal.         Speech: Speech normal.         Behavior: Behavior normal.         Thought Content: Thought content normal.         Judgment: Judgment normal.       Assessment/Plan   Diagnoses and all orders for this visit:  Preoperative clearance  -     XR chest 2 views; Future  -     CBC and Auto Differential; Future  -     Comprehensive Metabolic Panel; Future  -     ECG 12 lead (Clinic Performed) - Sinus bradycardia with suspect left anterior trisha-block. Result to be faxed to patient cardiologist Dr. Ramicone for review.   CKD (chronic kidney disease), stage II  -     CBC and Auto Differential; Future  -     Comprehensive Metabolic Panel; Future  Aortic valve regurgitation, acquired  -     CBC and Auto  Differential; Future  -     Comprehensive Metabolic Panel; Future  -     ECG 12 lead (Clinic Performed)     PLAN: As scheduled with Dr. Lopez  Labs & Chest x-ray-> call with results  Awaiting clearance from Cardiology - faxed EKG to Dr. Ramicone, likely will need appointment for clearance, instructed patient to call cardiology office.

## 2023-09-08 DIAGNOSIS — F41.1 GENERALIZED ANXIETY DISORDER: ICD-10-CM

## 2023-09-08 LAB
ALANINE AMINOTRANSFERASE (SGPT) (U/L) IN SER/PLAS: 24 U/L (ref 10–52)
ALBUMIN (G/DL) IN SER/PLAS: 4.6 G/DL (ref 3.4–5)
ALKALINE PHOSPHATASE (U/L) IN SER/PLAS: 76 U/L (ref 33–136)
ANION GAP IN SER/PLAS: 16 MMOL/L (ref 10–20)
ASPARTATE AMINOTRANSFERASE (SGOT) (U/L) IN SER/PLAS: 24 U/L (ref 9–39)
BASOPHILS (10*3/UL) IN BLOOD BY AUTOMATED COUNT: 0.01 X10E9/L (ref 0–0.1)
BASOPHILS/100 LEUKOCYTES IN BLOOD BY AUTOMATED COUNT: 0.1 % (ref 0–2)
BILIRUBIN TOTAL (MG/DL) IN SER/PLAS: 0.4 MG/DL (ref 0–1.2)
CALCIUM (MG/DL) IN SER/PLAS: 10.1 MG/DL (ref 8.6–10.6)
CARBON DIOXIDE, TOTAL (MMOL/L) IN SER/PLAS: 25 MMOL/L (ref 21–32)
CHLORIDE (MMOL/L) IN SER/PLAS: 104 MMOL/L (ref 98–107)
CREATININE (MG/DL) IN SER/PLAS: 1.75 MG/DL (ref 0.5–1.3)
EOSINOPHILS (10*3/UL) IN BLOOD BY AUTOMATED COUNT: 0.09 X10E9/L (ref 0–0.4)
EOSINOPHILS/100 LEUKOCYTES IN BLOOD BY AUTOMATED COUNT: 1 % (ref 0–6)
ERYTHROCYTE DISTRIBUTION WIDTH (RATIO) BY AUTOMATED COUNT: 13 % (ref 11.5–14.5)
ERYTHROCYTE MEAN CORPUSCULAR HEMOGLOBIN CONCENTRATION (G/DL) BY AUTOMATED: 32.5 G/DL (ref 32–36)
ERYTHROCYTE MEAN CORPUSCULAR VOLUME (FL) BY AUTOMATED COUNT: 100 FL (ref 80–100)
ERYTHROCYTES (10*6/UL) IN BLOOD BY AUTOMATED COUNT: 4.76 X10E12/L (ref 4.5–5.9)
GFR MALE: 40 ML/MIN/1.73M2
GLUCOSE (MG/DL) IN SER/PLAS: 78 MG/DL (ref 74–99)
HEMATOCRIT (%) IN BLOOD BY AUTOMATED COUNT: 47.7 % (ref 41–52)
HEMOGLOBIN (G/DL) IN BLOOD: 15.5 G/DL (ref 13.5–17.5)
IMMATURE GRANULOCYTES/100 LEUKOCYTES IN BLOOD BY AUTOMATED COUNT: 0.5 % (ref 0–0.9)
LEUKOCYTES (10*3/UL) IN BLOOD BY AUTOMATED COUNT: 8.9 X10E9/L (ref 4.4–11.3)
LYMPHOCYTES (10*3/UL) IN BLOOD BY AUTOMATED COUNT: 1.81 X10E9/L (ref 0.8–3)
LYMPHOCYTES/100 LEUKOCYTES IN BLOOD BY AUTOMATED COUNT: 20.4 % (ref 13–44)
MONOCYTES (10*3/UL) IN BLOOD BY AUTOMATED COUNT: 0.73 X10E9/L (ref 0.05–0.8)
MONOCYTES/100 LEUKOCYTES IN BLOOD BY AUTOMATED COUNT: 8.2 % (ref 2–10)
NEUTROPHILS (10*3/UL) IN BLOOD BY AUTOMATED COUNT: 6.2 X10E9/L (ref 1.6–5.5)
NEUTROPHILS/100 LEUKOCYTES IN BLOOD BY AUTOMATED COUNT: 69.8 % (ref 40–80)
NRBC (PER 100 WBCS) BY AUTOMATED COUNT: 0 /100 WBC (ref 0–0)
PLATELETS (10*3/UL) IN BLOOD AUTOMATED COUNT: 209 X10E9/L (ref 150–450)
POTASSIUM (MMOL/L) IN SER/PLAS: 4.3 MMOL/L (ref 3.5–5.3)
PROTEIN TOTAL: 7.2 G/DL (ref 6.4–8.2)
SODIUM (MMOL/L) IN SER/PLAS: 141 MMOL/L (ref 136–145)
UREA NITROGEN (MG/DL) IN SER/PLAS: 31 MG/DL (ref 6–23)

## 2023-09-08 RX ORDER — CLONAZEPAM 0.5 MG/1
0.5 TABLET ORAL EVERY 12 HOURS PRN
Qty: 30 TABLET | Refills: 0 | Status: SHIPPED | OUTPATIENT
Start: 2023-09-08 | End: 2023-10-10 | Stop reason: SDUPTHER

## 2023-09-08 NOTE — RESULT ENCOUNTER NOTE
Kidney function has decreased since last labs one month ago. Creatinine is now 1.75 and GFR 40. I need to obtain clearance for surgery from your nephrologist, still awaiting clearance from cardiologist at this time.  CBC+d unremarkable

## 2023-09-14 ENCOUNTER — HOSPITAL ENCOUNTER (OUTPATIENT)
Dept: DATA CONVERSION | Facility: HOSPITAL | Age: 73
End: 2023-09-14
Attending: ORTHOPAEDIC SURGERY | Admitting: ORTHOPAEDIC SURGERY
Payer: MEDICARE

## 2023-09-14 DIAGNOSIS — S83.232A COMPLEX TEAR OF MEDIAL MENISCUS, CURRENT INJURY, LEFT KNEE, INITIAL ENCOUNTER: ICD-10-CM

## 2023-09-18 DIAGNOSIS — F98.8 ATTENTION DEFICIT DISORDER, UNSPECIFIED HYPERACTIVITY PRESENCE: ICD-10-CM

## 2023-09-18 RX ORDER — DEXTROAMPHETAMINE SACCHARATE, AMPHETAMINE ASPARTATE MONOHYDRATE, DEXTROAMPHETAMINE SULFATE AND AMPHETAMINE SULFATE 1.25; 1.25; 1.25; 1.25 MG/1; MG/1; MG/1; MG/1
5 CAPSULE, EXTENDED RELEASE ORAL EVERY MORNING
Qty: 30 CAPSULE | Refills: 0 | Status: SHIPPED | OUTPATIENT
Start: 2023-09-18 | End: 2023-10-17 | Stop reason: SDUPTHER

## 2023-09-20 DIAGNOSIS — I47.10 PAROXYSMAL SUPRAVENTRICULAR TACHYCARDIA (CMS-HCC): ICD-10-CM

## 2023-09-20 DIAGNOSIS — I35.1 AORTIC VALVE INSUFFICIENCY, ETIOLOGY OF CARDIAC VALVE DISEASE UNSPECIFIED: ICD-10-CM

## 2023-09-21 RX ORDER — OXYCODONE HYDROCHLORIDE 5 MG/1
5 TABLET ORAL EVERY 4 HOURS PRN
COMMUNITY
Start: 2023-09-14 | End: 2023-11-11 | Stop reason: ALTCHOICE

## 2023-09-29 VITALS — HEIGHT: 70 IN | BODY MASS INDEX: 28.44 KG/M2 | WEIGHT: 198.63 LBS

## 2023-09-30 NOTE — H&P
History & Physical Reviewed:   I have reviewed the History and Physical dated:  12-Sep-2023   History and Physical reviewed and relevant findings noted. Patient examined to review pertinent physical  findings.: No significant changes   Home Medications Reviewed: no changes noted   Allergies Reviewed: no changes noted       ERAS (Enhanced Recovery After Surgery):  ·  ERAS Patient: no     Consent:   COVID-19 Consent:  ·  COVID-19 Risk Consent Surgeon has reviewed key risks related to the risk of johanna COVID-19 and if they contract COVID-19 what the risks are.       Electronic Signatures:  Azael Preciado)  (Signed 14-Sep-2023 08:56)   Authored: History & Physical Reviewed, ERAS, Consent,  Note Completion      Last Updated: 14-Sep-2023 08:56 by Azael Preciado)

## 2023-10-01 NOTE — OP NOTE
Post Operative Note:     PreOp Diagnosis: L KNEE MMT   Post-Procedure Diagnosis: SAME   Procedure: L KNEE SCOPE PMM   Surgeon: JELLY   Resident/Fellow/Other Assistant: NO   Anesthesia: GEN   Estimated Blood Loss (mL): 2   Specimen: no   Findings: SEE DX     Attestation:   Note Completion:  Attending Attestation I performed the procedure without a resident         Electronic Signatures:  Azael Preciado)  (Signed 14-Sep-2023 11:20)   Authored: Post Operative Note, Note Completion      Last Updated: 14-Sep-2023 11:20 by Azael Preciado)

## 2023-10-02 ENCOUNTER — TREATMENT (OUTPATIENT)
Dept: PHYSICAL THERAPY | Facility: CLINIC | Age: 73
End: 2023-10-02
Payer: MEDICARE

## 2023-10-02 DIAGNOSIS — M54.31 BILATERAL SCIATICA: ICD-10-CM

## 2023-10-02 DIAGNOSIS — M54.32 BILATERAL SCIATICA: ICD-10-CM

## 2023-10-02 DIAGNOSIS — M54.30 SCIATICA: Primary | ICD-10-CM

## 2023-10-02 PROCEDURE — 97110 THERAPEUTIC EXERCISES: CPT | Mod: CQ,GP

## 2023-10-02 ASSESSMENT — PAIN SCALES - GENERAL: PAINLEVEL_OUTOF10: 0 - NO PAIN

## 2023-10-02 NOTE — PROGRESS NOTES
Physical Therapy    Physical Therapy    Physical Therapy Treatment    Patient Name: Paul Prather  MRN: [id number]  Today's Date: 10/2/2023         Assessment:   Pt tolerated tx well considering recent fall and residual soreness in L knee.  Pt able to perform bridges today without low back pain, which is progress from last session.  Unsteadiness with retro walking along railing, requiring railing support to steady himself.   Continues to respond well to standing extension for back pain and radicular symptoms so advised pt to cont these as long as responds favorably. Voiced mild 1-2/10 LBP post tx.        Plan:  Cont with extension exercises for LBP as long as responds favorably   Recheck to add knee dx with Supervising PT next visit        Current Problem  1. Sciatica            Subjective   Pt reports back pain persists especially with bending over. Back bends continue to help with pain and radicular symptoms.   Radicular symptoms in BL ankles daily, usually in mornings (tingling, numbness)  He fell getting off the airplane on Tuesday when he arrived in [place]. L knee (post op) did swell so he did ice knee but still did a lot of walking. Still bruised along medial knee but improving. Pt still concerned and plans to reach out to surgeon.  Pt presents with script for L knee protocol     Precautions  Fall risk, low         Pain  Pain Score: 0 - No pain  Low back 0/10  No radicular symptoms currently     Objective   No measures today       Outcome Measures:  No measures today     Treatments:  Therapeutic Exercise  Therapeutic Exercise Performed:  (Nustep L4 x 5 min)  Therapeutic Exercise Activity 1: BL HSS x1 min, standing  Therapeutic Exercise Activity 2: RASTA (x10, PRN)  Therapeutic Exercise Activity 3: 2x10 (BL SLR)  Therapeutic Exercise Activity 4: 2x10 (Bridge)  Therapeutic Exercise Activity 5: 2x10 (Supine Clamshells GTB)  Therapeutic Exercise Activity 6: 2x10 (Prone hip ext)  Therapeutic Exercise Activity 7: 2x10  (Rows, DLS GTB)  Therapeutic Exercise Activity 8: 2x10 (Pulldowns, DLS, GTB)  Therapeutic Exercise Activity 9: 2x10 (Trunk rotation, seated on BSB, GTB)  Therapeutic Exercise Activity 10:  (Lateral walking along railing x 4L back and forth)  Therapeutic Exercise Activity 11: 2x10 (Seated lean backs)  Therapeutic Exercise Activity 12: x4 L back and forth (Fwd/ retro monster walk along railing)

## 2023-10-04 ENCOUNTER — TELEPHONE (OUTPATIENT)
Dept: PRIMARY CARE | Facility: CLINIC | Age: 73
End: 2023-10-04
Payer: MEDICARE

## 2023-10-04 DIAGNOSIS — J10.1 INFLUENZA A: Primary | ICD-10-CM

## 2023-10-04 RX ORDER — OSELTAMIVIR PHOSPHATE 75 MG/1
75 CAPSULE ORAL 2 TIMES DAILY
Qty: 10 CAPSULE | Refills: 0 | Status: SHIPPED | OUTPATIENT
Start: 2023-10-04 | End: 2023-10-09

## 2023-10-10 ENCOUNTER — TREATMENT (OUTPATIENT)
Dept: PHYSICAL THERAPY | Facility: CLINIC | Age: 73
End: 2023-10-10
Payer: MEDICARE

## 2023-10-10 DIAGNOSIS — M54.31 BILATERAL SCIATICA: Primary | ICD-10-CM

## 2023-10-10 DIAGNOSIS — F41.1 GENERALIZED ANXIETY DISORDER: ICD-10-CM

## 2023-10-10 DIAGNOSIS — Z79.899 HIGH RISK MEDICATION USE: Primary | ICD-10-CM

## 2023-10-10 DIAGNOSIS — M25.562 PAIN IN LEFT KNEE: ICD-10-CM

## 2023-10-10 DIAGNOSIS — M54.32 BILATERAL SCIATICA: Primary | ICD-10-CM

## 2023-10-10 DIAGNOSIS — M25.562 ACUTE PAIN OF LEFT KNEE: ICD-10-CM

## 2023-10-10 PROCEDURE — 97164 PT RE-EVAL EST PLAN CARE: CPT | Mod: GP | Performed by: PHYSICAL THERAPIST

## 2023-10-10 PROCEDURE — 97110 THERAPEUTIC EXERCISES: CPT | Mod: GP | Performed by: PHYSICAL THERAPIST

## 2023-10-10 RX ORDER — CLONAZEPAM 0.5 MG/1
0.5 TABLET ORAL EVERY 12 HOURS PRN
Qty: 30 TABLET | Refills: 0 | OUTPATIENT
Start: 2023-10-10

## 2023-10-10 RX ORDER — NALOXONE HYDROCHLORIDE 4 MG/.1ML
4 SPRAY NASAL AS NEEDED
Qty: 2 EACH | Refills: 0 | Status: SHIPPED | OUTPATIENT
Start: 2023-10-10 | End: 2023-11-11 | Stop reason: WASHOUT

## 2023-10-10 RX ORDER — CLONAZEPAM 0.5 MG/1
0.5 TABLET ORAL EVERY 12 HOURS PRN
Qty: 30 TABLET | Refills: 0 | Status: SHIPPED | OUTPATIENT
Start: 2023-10-10 | End: 2023-11-06 | Stop reason: SDUPTHER

## 2023-10-10 ASSESSMENT — PAIN - FUNCTIONAL ASSESSMENT: PAIN_FUNCTIONAL_ASSESSMENT: 0-10

## 2023-10-10 ASSESSMENT — PAIN SCALES - GENERAL: PAINLEVEL_OUTOF10: 0 - NO PAIN

## 2023-10-10 NOTE — PROGRESS NOTES
"Physical Therapy    Physical Therapy Re-eval    Patient Name: Paul Prather  MRN: [id number]  Today's Date: 10/10/2023  Time Calculation  Start Time: 1420  Stop Time: 1515  Time Calculation (min): 55 min      Current Problem  Problem List Items Addressed This Visit             ICD-10-CM    Pain in left knee M25.562    Bilateral sciatica - Primary M54.31, M54.32     1. Bilateral sciatica        2. Pain in left knee  PT eval and treat      3. Acute pain of left knee [M25.562]            Subjective :     Precautions  Precautions  Precautions Comment: none    Pain  Pain Assessment: 0-10  Pain Score: 0 - No pain    Pt reports    Objective L knee ROM compared to R is equal and WNL.    LE strength is decreased as noted with exercises and recent fall.  PT also has notable decrease in balance and will benefit from therapy to address.  New goals added       Assessment:     Pt has been seen for his low back pain with radiculopathy.  He had meniscectomy surgery recently and has a new order for the leg. We assessed the knee this visit and will continue with the back treatment.     Plan:     Continue to progress treatment according to POC.  Added LE strengthening and balance per menisectomy protocols.     Treatments:       Treatments:  Therapeutic Exercise    (Nustep L4 x 5 min)  1: BL HSS x1 min, standing   2: RASTA (x10, PRN)   3: 2x10 (BL SLR)   4: 2x10 (Bridge)   5: 2x10 (Supine Clamshells GTB)   6: 2x10 (Prone hip ext) HELD, time   7: 2x10 (Rows, DLS GTB) HELD   8: 2x10 (Pulldowns, DLS, GTB) HELD   9: 2x10 (Trunk rotation, seated on BSB, GTB) HELD   10:  (Lateral walking and monster walks YTB 2 laps  11: 2x10 (Seated lean backs) HELD  12: x4 L back and forth (Fwd/ retro monster walk along railing)   13. hip abd and ext YTB x 20 ea  14. Step-downs 2\" x 20 (reports R SI pain)  15. Quarter squat wall slide x 20  16. Ecc LAQ 2# x 20  17. Airex march 2 min     Goals:    ST. tingling in feet to centralize to back  10/10 pt reports " this is better  2. pain no higher than 3/10 back  MET 10/10  LTG  3. LE strength to at least 4+/5 10/10 progressing  4. Lumbar AROM to 100% flexion and 75% ext without pain  5. ERNST to 10% NT  6.  SLS to at least 20 sec on the L NEW

## 2023-10-17 DIAGNOSIS — F98.8 ATTENTION DEFICIT DISORDER, UNSPECIFIED HYPERACTIVITY PRESENCE: ICD-10-CM

## 2023-10-18 ENCOUNTER — APPOINTMENT (OUTPATIENT)
Dept: PHYSICAL THERAPY | Facility: CLINIC | Age: 73
End: 2023-10-18
Payer: MEDICARE

## 2023-10-18 RX ORDER — DEXTROAMPHETAMINE SACCHARATE, AMPHETAMINE ASPARTATE MONOHYDRATE, DEXTROAMPHETAMINE SULFATE AND AMPHETAMINE SULFATE 1.25; 1.25; 1.25; 1.25 MG/1; MG/1; MG/1; MG/1
5 CAPSULE, EXTENDED RELEASE ORAL EVERY MORNING
Qty: 30 CAPSULE | Refills: 0 | Status: SHIPPED | OUTPATIENT
Start: 2023-10-18 | End: 2023-11-13 | Stop reason: SDUPTHER

## 2023-10-20 ENCOUNTER — OFFICE VISIT (OUTPATIENT)
Dept: NEPHROLOGY | Facility: CLINIC | Age: 73
End: 2023-10-20
Payer: MEDICARE

## 2023-10-20 ENCOUNTER — OFFICE VISIT (OUTPATIENT)
Dept: ORTHOPEDIC SURGERY | Facility: CLINIC | Age: 73
End: 2023-10-20
Payer: MEDICARE

## 2023-10-20 VITALS
SYSTOLIC BLOOD PRESSURE: 108 MMHG | OXYGEN SATURATION: 95 % | DIASTOLIC BLOOD PRESSURE: 72 MMHG | HEIGHT: 70 IN | HEART RATE: 70 BPM | BODY MASS INDEX: 27.43 KG/M2 | WEIGHT: 191.58 LBS

## 2023-10-20 DIAGNOSIS — M25.562 ACUTE PAIN OF LEFT KNEE: Primary | ICD-10-CM

## 2023-10-20 DIAGNOSIS — Z79.1 NSAID LONG-TERM USE: ICD-10-CM

## 2023-10-20 DIAGNOSIS — N18.30 STAGE 3 CHRONIC KIDNEY DISEASE, UNSPECIFIED WHETHER STAGE 3A OR 3B CKD (MULTI): Primary | ICD-10-CM

## 2023-10-20 DIAGNOSIS — I10 HTN (HYPERTENSION), BENIGN: ICD-10-CM

## 2023-10-20 PROCEDURE — 3078F DIAST BP <80 MM HG: CPT | Performed by: NURSE PRACTITIONER

## 2023-10-20 PROCEDURE — 99024 POSTOP FOLLOW-UP VISIT: CPT

## 2023-10-20 PROCEDURE — 1160F RVW MEDS BY RX/DR IN RCRD: CPT

## 2023-10-20 PROCEDURE — 1125F AMNT PAIN NOTED PAIN PRSNT: CPT | Performed by: NURSE PRACTITIONER

## 2023-10-20 PROCEDURE — 1159F MED LIST DOCD IN RCRD: CPT

## 2023-10-20 PROCEDURE — 1160F RVW MEDS BY RX/DR IN RCRD: CPT | Performed by: NURSE PRACTITIONER

## 2023-10-20 PROCEDURE — 1159F MED LIST DOCD IN RCRD: CPT | Performed by: NURSE PRACTITIONER

## 2023-10-20 PROCEDURE — 3074F SYST BP LT 130 MM HG: CPT | Performed by: NURSE PRACTITIONER

## 2023-10-20 PROCEDURE — 99204 OFFICE O/P NEW MOD 45 MIN: CPT | Performed by: NURSE PRACTITIONER

## 2023-10-20 PROCEDURE — 1125F AMNT PAIN NOTED PAIN PRSNT: CPT

## 2023-10-20 RX ORDER — METHYLPREDNISOLONE 4 MG/1
4 TABLET ORAL ONCE
Qty: 21 TABLET | Refills: 0 | Status: SHIPPED | OUTPATIENT
Start: 2023-10-20 | End: 2023-10-25

## 2023-10-20 NOTE — PROGRESS NOTES
Subjective    Patient ID: Paul Prather is a 73 y.o. male.    Chief Complaint: Follow-up of the Left Knee (SCOPE 9/14/23 /PATIENT FELL GETTING OFF A PLANE. )     HPI  Patient is seen today for left knee pain. He had a left knee arthroscopy with Dr. Preciado on September 14, 2023. He has been progressing well. On 9/29/2023, patient states that he fell getting off a plane when arriving in [place]. He did not fall onto left knee. He did not experience any immediate swelling or pain. He was able to ambulate well throughout his trip in [facility] without difficulty. Over the next weeks, he noticed increased superficial pain to medial knee. Describes pain as a medial ache, He has not noted any significant swelling, bruising, or redness. Denies limited left knee range of motion. Denies recent fever, but does state that he recently had the flu.     Objective   Ortho Exam  Walks in the office with a normal gait. Left knee appearing without soft tissue swelling, erythema, or warmth. Portals are well healed with signs of infection. There is a mild effusion. He is minimally tender upon palpation of left medial joint line and medial distal thigh. Range of motion is 0-115 degrees. Sensation is intact to light touch.     Assessment/Plan   Encounter Diagnoses:  Acute pain of left knee  Discussion with patient about aggravation of underlying left knee arthritis. He will continue with physical therapy. I have prescribed him a Medrol dose pack to help decrease inflammation. Advised follow-up with Dr. Preciado in 2 weeks if still symptomatic.     Orders Placed This Encounter    methylPREDNISolone (Medrol Dospak) 4 mg tablets

## 2023-10-20 NOTE — PROGRESS NOTES
History Of Present Illness  Paul Prather is a 73 y.o. male with medical history significant for CKD, HTN, ADHD, chronic back pain, and anxiety who presents for an initial evaluation for CKD. Reportedly he has had followed for CKD at the [facility] since 2016, and the last visit was in March 2023.      Past Medical History  As above.    Surgical History  He has no past surgical history on file.     Social History  He reports that he has quit smoking. His smoking use included cigarettes. He does not have any smokeless tobacco history on file. He reports current alcohol use. He reports that he does not use drugs.    Family History  Family History   Problem Relation Name Age of Onset    Other (arteriosclerotic vascular disease) Father          Allergies  Other and Sulfa (sulfonamide antibiotics)    Review of Systems   Constitutional: Negative.    HENT: Negative.     Respiratory: Negative.     Cardiovascular: Negative.    Gastrointestinal: Negative.    Endocrine: Negative.    Genitourinary: Negative.    Musculoskeletal:  Positive for back pain.   Skin: Negative.    Neurological: Negative.    Hematological: Negative.    Psychiatric/Behavioral: Negative.          Physical Exam  Constitutional:       Appearance: Normal appearance.   HENT:      Head: Normocephalic and atraumatic.      Mouth/Throat:      Mouth: Mucous membranes are moist.   Cardiovascular:      Rate and Rhythm: Normal rate and regular rhythm.      Pulses: Normal pulses.      Heart sounds: Normal heart sounds.   Pulmonary:      Effort: Pulmonary effort is normal.      Breath sounds: Normal breath sounds.   Musculoskeletal:         General: Normal range of motion.   Skin:     General: Skin is warm and dry.   Neurological:      General: No focal deficit present.      Mental Status: He is alert and oriented to person, place, and time.   Psychiatric:         Mood and Affect: Mood normal.         Behavior: Behavior normal.         Thought Content:  "Thought content normal.         Judgment: Judgment normal.          Last Recorded Vitals  Blood pressure 108/72, pulse 70, height 1.778 m (5' 10\"), weight 86.9 kg (191 lb 9.3 oz), SpO2 95 %.    Relevant Results  Recent Results (from the past 2016 hour(s))   CBC and Auto Differential    Collection Time: 09/07/23  3:30 PM   Result Value Ref Range    WBC 8.9 4.4 - 11.3 x10E9/L    nRBC 0.0 0.0 - 0.0 /100 WBC    RBC 4.76 4.50 - 5.90 x10E12/L    Hemoglobin 15.5 13.5 - 17.5 g/dL    Hematocrit 47.7 41.0 - 52.0 %     80 - 100 fL    MCHC 32.5 32.0 - 36.0 g/dL    Platelets 209 150 - 450 x10E9/L    RDW 13.0 11.5 - 14.5 %    Neutrophils % 69.8 40.0 - 80.0 %    Immature Granulocytes %, Automated 0.5 0.0 - 0.9 %    Lymphocytes % 20.4 13.0 - 44.0 %    Monocytes % 8.2 2.0 - 10.0 %    Eosinophils % 1.0 0.0 - 6.0 %    Basophils % 0.1 0.0 - 2.0 %    Neutrophils Absolute 6.20 (H) 1.60 - 5.50 x10E9/L    Lymphocytes Absolute 1.81 0.80 - 3.00 x10E9/L    Monocytes Absolute 0.73 0.05 - 0.80 x10E9/L    Eosinophils Absolute 0.09 0.00 - 0.40 x10E9/L    Basophils Absolute 0.01 0.00 - 0.10 x10E9/L   Comprehensive Metabolic Panel    Collection Time: 09/07/23  3:30 PM   Result Value Ref Range    Glucose 78 74 - 99 mg/dL    Sodium 141 136 - 145 mmol/L    Potassium 4.3 3.5 - 5.3 mmol/L    Chloride 104 98 - 107 mmol/L    Bicarbonate 25 21 - 32 mmol/L    Anion Gap 16 10 - 20 mmol/L    Urea Nitrogen 31 (H) 6 - 23 mg/dL    Creatinine 1.75 (H) 0.50 - 1.30 mg/dL    GFR MALE 40 (A) >90 mL/min/1.73m2    Calcium 10.1 8.6 - 10.6 mg/dL    Albumin 4.6 3.4 - 5.0 g/dL    Alkaline Phosphatase 76 33 - 136 U/L    Total Protein 7.2 6.4 - 8.2 g/dL    AST 24 9 - 39 U/L    Total Bilirubin 0.4 0.0 - 1.2 mg/dL    ALT (SGPT) 24 10 - 52 U/L   CBC    Collection Time: 10/27/23  1:54 PM   Result Value Ref Range    WBC 10.1 4.4 - 11.3 x10*3/uL    nRBC 0.0 0.0 - 0.0 /100 WBCs    RBC 4.67 4.50 - 5.90 x10*6/uL    Hemoglobin 15.1 13.5 - 17.5 g/dL    Hematocrit 46.4 41.0 - " 52.0 %    MCV 99 80 - 100 fL    MCH 32.3 26.0 - 34.0 pg    MCHC 32.5 32.0 - 36.0 g/dL    RDW 13.2 11.5 - 14.5 %    Platelets 255 150 - 450 x10*3/uL    MPV 11.9 (H) 7.5 - 11.5 fL   Basic Metabolic Panel    Collection Time: 10/27/23  1:54 PM   Result Value Ref Range    Glucose 70 (L) 74 - 99 mg/dL    Sodium 139 136 - 145 mmol/L    Potassium 4.6 3.5 - 5.3 mmol/L    Chloride 104 98 - 107 mmol/L    Bicarbonate 28 21 - 32 mmol/L    Anion Gap 12 10 - 20 mmol/L    Urea Nitrogen 32 (H) 6 - 23 mg/dL    Creatinine 1.67 (H) 0.50 - 1.30 mg/dL    eGFR 43 (L) >60 mL/min/1.73m*2    Calcium 9.8 8.6 - 10.6 mg/dL   Vitamin D 25-Hydroxy,Total (for eval of Vitamin D levels)    Collection Time: 10/27/23  1:54 PM   Result Value Ref Range    Vitamin D, 25-Hydroxy, Total 41 30 - 100 ng/mL   Parathyroid Hormone, Intact    Collection Time: 10/27/23  1:54 PM   Result Value Ref Range    Parathyroid Hormone, Intact 32.7 18.5 - 88.0 pg/mL   Extra Urine Gray Tube    Collection Time: 10/27/23  1:54 PM   Result Value Ref Range    Extra Tube Hold for add-ons.    Urinalysis with Reflex Microscopic    Collection Time: 10/30/23  9:17 AM   Result Value Ref Range    Color, Urine Yellow Straw, Yellow    Appearance, Urine Clear Clear    Specific Gravity, Urine 1.015 1.005 - 1.035    pH, Urine 5.0 5.0, 5.5, 6.0, 6.5, 7.0, 7.5, 8.0    Protein, Urine NEGATIVE NEGATIVE mg/dL    Glucose, Urine NEGATIVE NEGATIVE mg/dL    Blood, Urine SMALL (1+) (A) NEGATIVE    Ketones, Urine NEGATIVE NEGATIVE mg/dL    Bilirubin, Urine NEGATIVE NEGATIVE    Urobilinogen, Urine <2.0 <2.0 mg/dL    Nitrite, Urine NEGATIVE NEGATIVE    Leukocyte Esterase, Urine NEGATIVE NEGATIVE   Microscopic Only, Urine    Collection Time: 10/30/23  9:17 AM   Result Value Ref Range    WBC, Urine NONE 1-5, NONE /HPF    RBC, Urine 1-2 NONE, 1-2, 3-5 /HPF    Mucus, Urine 1+ Reference range not established. /LPF   Urinalysis with Reflex Microscopic and Culture    Collection Time: 10/30/23  2:28 PM   Result  "Value Ref Range    Color, Urine Yellow Straw, Yellow    Appearance, Urine Clear Clear    Specific Gravity, Urine 1.015 1.005 - 1.035    pH, Urine 5.0 5.0, 5.5, 6.0, 6.5, 7.0, 7.5, 8.0    Protein, Urine NEGATIVE NEGATIVE mg/dL    Glucose, Urine NEGATIVE NEGATIVE mg/dL    Blood, Urine SMALL (1+) (A) NEGATIVE    Ketones, Urine NEGATIVE NEGATIVE mg/dL    Bilirubin, Urine NEGATIVE NEGATIVE    Urobilinogen, Urine <2.0 <2.0 mg/dL    Nitrite, Urine NEGATIVE NEGATIVE    Leukocyte Esterase, Urine NEGATIVE NEGATIVE   Extra Urine Gray Tube    Collection Time: 10/30/23  2:28 PM   Result Value Ref Range    Extra Tube Hold for add-ons.    Albumin , Urine Random    Collection Time: 10/30/23  2:28 PM   Result Value Ref Range    Albumin, Urine Random 16.0 Not established mg/L    Creatinine, Urine Random 104.0 20.0 - 370.0 mg/dL    Albumin/Creatine Ratio 15.4 <30.0 ug/mg Creat   Urinalysis Microscopic    Collection Time: 10/30/23  2:28 PM   Result Value Ref Range    WBC, Urine 1-5 1-5, NONE /HPF    RBC, Urine 1-2 NONE, 1-2, 3-5 /HPF    Mucus, Urine 1+ Reference range not established. /LPF         Assessment/Plan   73 y.o. male with medical history significant for CKD3, HTN, ADHD, chronic back pain, and anxiety who presents for an initial evaluation for CKD. Reportedly he has had followed for CKD at the [facility] since 2016, and the last visit was in March 2023.     # CKD3: baseline sCr 1.6 - 2.0 mg/dL with eGFR 35 - 47 ml/min/1.73m2 since at least 2016. 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 Per note from the [facility], \"renal biopsy showed 10-15% tubular atrophy/interstitial fibrosis with some glomerulosclerosis\". Renal US done on 11/14/22 showed left renal nonobstructive calculus and small cyst otherwise unremarkable. Most recent sCr 1.75 mg/dL (9/7/23) - at baseline. Currently stable.    # HTN: well controlled with current regimen.    # Long-term " NSAIDs use: stopped 2 weeks ago.     Plan:  - Labs: CBC, BMP, PTH, Vit D; UA, urine spot.  - No changes with current medications.  - Reviewed strategies for preserving remaining kidney function includin) Avoidance of NSAIDs including Aleve (Naprosyn), Motrin (Ibuprofen), Mobic (Meloxicam), Celebrex (Celecoxib) and aspirin as well as PPI acid blocking medications such as Prilosec (omeprazole), Protonix (pantoprazole), and Nexium (esomeprazole), as well as other nephrotoxic agents.   2) Avoidance of tobacco or alcohol use.   3) Adequate hydration daily.   4) Blood pressure target 130/80 mmHg.   5) Daily dietary sodium intake less than 2 grams per day.    6) Maintain healthy lifestyle. Healthy diet and regular exercises.   7) Maintain ideal weight.  - FUV: in 4 months or sooner if concerns arise.     Patient verbalized understanding of above. He will not hesitate to contact Division of Nephrology at [phone] with concerns/questions.    I spent 45 minutes in the professional and overall care of this patient.      Dilcia Barnard, APRN-CNP

## 2023-10-23 ENCOUNTER — TREATMENT (OUTPATIENT)
Dept: PHYSICAL THERAPY | Facility: CLINIC | Age: 73
End: 2023-10-23
Payer: MEDICARE

## 2023-10-23 DIAGNOSIS — M25.562 ACUTE PAIN OF LEFT KNEE: ICD-10-CM

## 2023-10-23 DIAGNOSIS — M25.562 PAIN IN LEFT KNEE: ICD-10-CM

## 2023-10-23 DIAGNOSIS — M54.31 BILATERAL SCIATICA: Primary | ICD-10-CM

## 2023-10-23 DIAGNOSIS — M54.32 BILATERAL SCIATICA: Primary | ICD-10-CM

## 2023-10-23 PROCEDURE — 97110 THERAPEUTIC EXERCISES: CPT | Mod: GP | Performed by: PHYSICAL THERAPIST

## 2023-10-23 ASSESSMENT — PAIN SCALES - GENERAL: PAINLEVEL_OUTOF10: 2

## 2023-10-23 ASSESSMENT — PAIN - FUNCTIONAL ASSESSMENT: PAIN_FUNCTIONAL_ASSESSMENT: 0-10

## 2023-10-23 NOTE — PROGRESS NOTES
"Physical Therapy    Physical Therapy Re-eval    Patient Name: Paul Prather  MRN: [id number]  Today's Date: 10/23/2023  Time Calculation  Start Time: 0915  Stop Time: 1000  Time Calculation (min): 45 min      Current Problem  Problem List Items Addressed This Visit             ICD-10-CM    Pain in left knee M25.562    Relevant Orders    Follow Up In Physical Therapy    Bilateral sciatica - Primary M54.31, M54.32    Relevant Orders    Follow Up In Physical Therapy         Subjective :   Pt overall notes he followed up with his knee surgeon regarding swelling and was placed on steroids.  Note he had some back pain after mowing grass over the weekend.     Precautions  Precautions  Precautions Comment: none    Pain  Pain Assessment: 0-10  Pain Score: 2  Pain Location: Back  Pain Orientation: Other (Comment) (Across low back)  Denies knee pain today     Objective   Knee ROM WNL L LE  Strength L knee 4+/5, ext 4/5 with some pain noted distal quad    Treatments:  Therapeutic Exercise x 45 min   Nustep L4 x 5 min  BL HSS x1 min, standing  RASTA (x10, PRN)  2 x 10 (BL SLR)  2 x 10 (Bridge)  back and forth (Fwd/ retro monster walk along railing) YTB  hip abd, flex and ext YTB x 20 ea  Quarter squat wall slide x 20  Ecc LAQ 2# x 20  Airex march 2 min  Airex tandem stand x 1 min ea   Step up 6\" 2 x 10   Step-downs 2\" x 20  Reverse step step up 2\" 2 x 10 L LE  on step    Assessment:  Pt doing well with his L knee ROM since surgery and still some weakness noted in the quad. Pt overall tolerated treatment well but did have some p[ain with step downs.     Plan  Continue to progress treatment according to POC.  Continue to fucus on L LE strength.     Goals:     LTG  3. LE strength to at least 4+/5   4. Lumbar AROM to 100% flexion and 75% ext without pain  5. ERNST to 10%  6.  SLS to at least 20 sec on the L     "

## 2023-10-25 ENCOUNTER — OFFICE VISIT (OUTPATIENT)
Dept: PRIMARY CARE | Facility: CLINIC | Age: 73
End: 2023-10-25
Payer: MEDICARE

## 2023-10-25 VITALS
WEIGHT: 190 LBS | HEART RATE: 66 BPM | SYSTOLIC BLOOD PRESSURE: 122 MMHG | DIASTOLIC BLOOD PRESSURE: 72 MMHG | OXYGEN SATURATION: 98 % | BODY MASS INDEX: 27.2 KG/M2 | HEIGHT: 70 IN | TEMPERATURE: 97.1 F

## 2023-10-25 DIAGNOSIS — I10 HTN (HYPERTENSION), BENIGN: ICD-10-CM

## 2023-10-25 DIAGNOSIS — F98.8 ATTENTION DEFICIT DISORDER, UNSPECIFIED HYPERACTIVITY PRESENCE: Primary | ICD-10-CM

## 2023-10-25 DIAGNOSIS — F32.1 DEPRESSION, MAJOR, SINGLE EPISODE, MODERATE (MULTI): ICD-10-CM

## 2023-10-25 DIAGNOSIS — I47.10 SVT (SUPRAVENTRICULAR TACHYCARDIA) (CMS-HCC): ICD-10-CM

## 2023-10-25 DIAGNOSIS — F32.5 DEPRESSION, MAJOR, IN REMISSION (CMS-HCC): ICD-10-CM

## 2023-10-25 PROCEDURE — 1125F AMNT PAIN NOTED PAIN PRSNT: CPT | Performed by: INTERNAL MEDICINE

## 2023-10-25 PROCEDURE — 1160F RVW MEDS BY RX/DR IN RCRD: CPT | Performed by: INTERNAL MEDICINE

## 2023-10-25 PROCEDURE — 3078F DIAST BP <80 MM HG: CPT | Performed by: INTERNAL MEDICINE

## 2023-10-25 PROCEDURE — 1159F MED LIST DOCD IN RCRD: CPT | Performed by: INTERNAL MEDICINE

## 2023-10-25 PROCEDURE — 99213 OFFICE O/P EST LOW 20 MIN: CPT | Performed by: INTERNAL MEDICINE

## 2023-10-25 PROCEDURE — 3074F SYST BP LT 130 MM HG: CPT | Performed by: INTERNAL MEDICINE

## 2023-10-25 NOTE — PROGRESS NOTES
"Subjective   Patient ID: Paul Prather is a 73 y.o. male who presents for Follow-up.    HPI   Follow up add, svt, nilsa,    Review of Systems  No cp, sob, palps, syncope    Objective   /72   Pulse 66   Temp 36.2 °C (97.1 °F)   Ht 1.778 m (5' 10\")   Wt 86.2 kg (190 lb)   SpO2 98%   BMI 27.26 kg/m²     Physical Exam  Gen nad, affect wnl  Heentt eomfg, face symmetric, ncat  Neck w/o la, tm, bruit  Lungs clear   Cv rrr nl s1, s2  Ext w/o edema  Neuro grossly nonfocal  Skin good color    Assessment/Plan   Add  Nilsa  svt     Fu 3 mos  Fu cardio, neph  "

## 2023-10-27 ENCOUNTER — LAB (OUTPATIENT)
Dept: LAB | Facility: LAB | Age: 73
End: 2023-10-27
Payer: MEDICARE

## 2023-10-27 DIAGNOSIS — N18.30 STAGE 3 CHRONIC KIDNEY DISEASE, UNSPECIFIED WHETHER STAGE 3A OR 3B CKD (MULTI): ICD-10-CM

## 2023-10-27 LAB — HOLD SPECIMEN: NORMAL

## 2023-10-27 PROCEDURE — 80048 BASIC METABOLIC PNL TOTAL CA: CPT

## 2023-10-27 PROCEDURE — 83970 ASSAY OF PARATHORMONE: CPT

## 2023-10-27 PROCEDURE — 85027 COMPLETE CBC AUTOMATED: CPT

## 2023-10-27 PROCEDURE — 36415 COLL VENOUS BLD VENIPUNCTURE: CPT

## 2023-10-27 PROCEDURE — 82306 VITAMIN D 25 HYDROXY: CPT

## 2023-10-28 LAB
25(OH)D3 SERPL-MCNC: 41 NG/ML (ref 30–100)
ANION GAP SERPL CALC-SCNC: 12 MMOL/L (ref 10–20)
BUN SERPL-MCNC: 32 MG/DL (ref 6–23)
CALCIUM SERPL-MCNC: 9.8 MG/DL (ref 8.6–10.6)
CHLORIDE SERPL-SCNC: 104 MMOL/L (ref 98–107)
CO2 SERPL-SCNC: 28 MMOL/L (ref 21–32)
CREAT SERPL-MCNC: 1.67 MG/DL (ref 0.5–1.3)
ERYTHROCYTE [DISTWIDTH] IN BLOOD BY AUTOMATED COUNT: 13.2 % (ref 11.5–14.5)
GFR SERPL CREATININE-BSD FRML MDRD: 43 ML/MIN/1.73M*2
GLUCOSE SERPL-MCNC: 70 MG/DL (ref 74–99)
HCT VFR BLD AUTO: 46.4 % (ref 41–52)
HGB BLD-MCNC: 15.1 G/DL (ref 13.5–17.5)
MCH RBC QN AUTO: 32.3 PG (ref 26–34)
MCHC RBC AUTO-ENTMCNC: 32.5 G/DL (ref 32–36)
MCV RBC AUTO: 99 FL (ref 80–100)
NRBC BLD-RTO: 0 /100 WBCS (ref 0–0)
PLATELET # BLD AUTO: 255 X10*3/UL (ref 150–450)
PMV BLD AUTO: 11.9 FL (ref 7.5–11.5)
POTASSIUM SERPL-SCNC: 4.6 MMOL/L (ref 3.5–5.3)
PTH-INTACT SERPL-MCNC: 32.7 PG/ML (ref 18.5–88)
RBC # BLD AUTO: 4.67 X10*6/UL (ref 4.5–5.9)
SODIUM SERPL-SCNC: 139 MMOL/L (ref 136–145)
WBC # BLD AUTO: 10.1 X10*3/UL (ref 4.4–11.3)

## 2023-10-30 ENCOUNTER — TREATMENT (OUTPATIENT)
Dept: PHYSICAL THERAPY | Facility: CLINIC | Age: 73
End: 2023-10-30
Payer: MEDICARE

## 2023-10-30 ENCOUNTER — LAB (OUTPATIENT)
Dept: LAB | Facility: LAB | Age: 73
End: 2023-10-30
Payer: MEDICARE

## 2023-10-30 DIAGNOSIS — M25.562 LEFT KNEE PAIN, UNSPECIFIED CHRONICITY: ICD-10-CM

## 2023-10-30 DIAGNOSIS — M25.562 PAIN IN LEFT KNEE: ICD-10-CM

## 2023-10-30 DIAGNOSIS — N18.30 STAGE 3 CHRONIC KIDNEY DISEASE, UNSPECIFIED WHETHER STAGE 3A OR 3B CKD (MULTI): ICD-10-CM

## 2023-10-30 DIAGNOSIS — M54.30 ACUTE SCIATICA: Primary | ICD-10-CM

## 2023-10-30 DIAGNOSIS — N18.30 CHRONIC KIDNEY DISEASE, STAGE 3 UNSPECIFIED (MULTI): Primary | ICD-10-CM

## 2023-10-30 LAB
APPEARANCE UR: CLEAR
APPEARANCE UR: CLEAR
BILIRUB UR STRIP.AUTO-MCNC: NEGATIVE MG/DL
BILIRUB UR STRIP.AUTO-MCNC: NEGATIVE MG/DL
COLOR UR: YELLOW
COLOR UR: YELLOW
CREAT UR-MCNC: 104 MG/DL (ref 20–370)
GLUCOSE UR STRIP.AUTO-MCNC: NEGATIVE MG/DL
GLUCOSE UR STRIP.AUTO-MCNC: NEGATIVE MG/DL
HOLD SPECIMEN: NORMAL
KETONES UR STRIP.AUTO-MCNC: NEGATIVE MG/DL
KETONES UR STRIP.AUTO-MCNC: NEGATIVE MG/DL
LEUKOCYTE ESTERASE UR QL STRIP.AUTO: NEGATIVE
LEUKOCYTE ESTERASE UR QL STRIP.AUTO: NEGATIVE
MICROALBUMIN UR-MCNC: 16 MG/L
MICROALBUMIN/CREAT UR: 15.4 UG/MG CREAT
MUCOUS THREADS #/AREA URNS AUTO: NORMAL /LPF
MUCOUS THREADS #/AREA URNS AUTO: NORMAL /LPF
NITRITE UR QL STRIP.AUTO: NEGATIVE
NITRITE UR QL STRIP.AUTO: NEGATIVE
PH UR STRIP.AUTO: 5 [PH]
PH UR STRIP.AUTO: 5 [PH]
PROT UR STRIP.AUTO-MCNC: NEGATIVE MG/DL
PROT UR STRIP.AUTO-MCNC: NEGATIVE MG/DL
RBC # UR STRIP.AUTO: ABNORMAL /UL
RBC # UR STRIP.AUTO: ABNORMAL /UL
RBC #/AREA URNS AUTO: NORMAL /HPF
RBC #/AREA URNS AUTO: NORMAL /HPF
SP GR UR STRIP.AUTO: 1.01
SP GR UR STRIP.AUTO: 1.01
UROBILINOGEN UR STRIP.AUTO-MCNC: <2 MG/DL
UROBILINOGEN UR STRIP.AUTO-MCNC: <2 MG/DL
WBC #/AREA URNS AUTO: NORMAL /HPF
WBC #/AREA URNS AUTO: NORMAL /HPF

## 2023-10-30 PROCEDURE — 81001 URINALYSIS AUTO W/SCOPE: CPT

## 2023-10-30 PROCEDURE — 97110 THERAPEUTIC EXERCISES: CPT | Mod: GP | Performed by: PHYSICAL THERAPIST

## 2023-10-30 PROCEDURE — 82043 UR ALBUMIN QUANTITATIVE: CPT

## 2023-10-30 PROCEDURE — 82570 ASSAY OF URINE CREATININE: CPT

## 2023-10-30 ASSESSMENT — PAIN SCALES - GENERAL: PAINLEVEL_OUTOF10: 1

## 2023-10-30 ASSESSMENT — PAIN - FUNCTIONAL ASSESSMENT: PAIN_FUNCTIONAL_ASSESSMENT: 0-10

## 2023-10-30 NOTE — PROGRESS NOTES
"Physical Therapy    Physical Therapy Treatment    Patient Name: Paul Prather  MRN: [id number]  Today's Date: 10/30/2023  Time Calculation  Start Time: 0920  Stop Time: 1000  Time Calculation (min): 40 min    Current Problem  Problem List Items Addressed This Visit             ICD-10-CM    Acute sciatica - Primary M54.30    Pain in left knee M25.562        Subjective   Pt overall note some knee pain today after doing some hiking this past weekend. Notes his back is doing well though.     Precautions  Precautions  Precautions Comment: none    Pain  Pain Assessment: 0-10  Pain Score: 1  Pain Location: Back    Objective   No measures today     Treatments:  Therapeutic Exercise x 45 min   Nustep L4 x 5 min  BL HSS x1 min, standing  RASTA (x10, PRN)  2 x 10 (BL SLR)  2 x 10 (Bridge)  back and forth (Fwd/ retro monster walk along railing) YTB  hip abd, flex and ext YTB x 20 ea  Quarter squat wall slide x 20  Ecc LAQ 2# x 20  Airex march 2 min  Airex tandem stand x 1 min ea   Step up 6\" 2 x 10   Step-downs 2\" x 20  Reverse step step up 2\" 2 x 10 L LE  on step    Assessment:  Pt overall has some increase pain in the L knee with activity today and recommended to rest and ice at home as necessary. Otherwise noted to be doing well with is low back at this time.     Plan:  Cont to progress knee strength and WB activity as tolerated.     Goals:     LTG  3. LE strength to at least 4+/5   4. Lumbar AROM to 100% flexion and 75% ext without pain  5. ERNST to 10%  6.  SLS to at least 20 sec on the L  "

## 2023-11-02 ENCOUNTER — TREATMENT (OUTPATIENT)
Dept: PHYSICAL THERAPY | Facility: CLINIC | Age: 73
End: 2023-11-02
Payer: MEDICARE

## 2023-11-02 ENCOUNTER — OFFICE VISIT (OUTPATIENT)
Dept: ORTHOPEDIC SURGERY | Facility: CLINIC | Age: 73
End: 2023-11-02
Payer: MEDICARE

## 2023-11-02 VITALS — BODY MASS INDEX: 27.49 KG/M2 | WEIGHT: 192 LBS | HEIGHT: 70 IN

## 2023-11-02 DIAGNOSIS — M54.30 ACUTE SCIATICA: Primary | ICD-10-CM

## 2023-11-02 DIAGNOSIS — M25.562 PAIN IN LEFT KNEE: ICD-10-CM

## 2023-11-02 DIAGNOSIS — M25.562 ACUTE PAIN OF LEFT KNEE: Primary | ICD-10-CM

## 2023-11-02 PROCEDURE — 1125F AMNT PAIN NOTED PAIN PRSNT: CPT | Performed by: ORTHOPAEDIC SURGERY

## 2023-11-02 PROCEDURE — 1160F RVW MEDS BY RX/DR IN RCRD: CPT | Performed by: ORTHOPAEDIC SURGERY

## 2023-11-02 PROCEDURE — 3078F DIAST BP <80 MM HG: CPT | Performed by: ORTHOPAEDIC SURGERY

## 2023-11-02 PROCEDURE — 3074F SYST BP LT 130 MM HG: CPT | Performed by: ORTHOPAEDIC SURGERY

## 2023-11-02 PROCEDURE — 99024 POSTOP FOLLOW-UP VISIT: CPT | Performed by: ORTHOPAEDIC SURGERY

## 2023-11-02 PROCEDURE — 97110 THERAPEUTIC EXERCISES: CPT | Mod: GP | Performed by: PHYSICAL THERAPIST

## 2023-11-02 PROCEDURE — 1159F MED LIST DOCD IN RCRD: CPT | Performed by: ORTHOPAEDIC SURGERY

## 2023-11-02 ASSESSMENT — PAIN - FUNCTIONAL ASSESSMENT: PAIN_FUNCTIONAL_ASSESSMENT: 0-10

## 2023-11-02 ASSESSMENT — PAIN SCALES - GENERAL: PAINLEVEL_OUTOF10: 1

## 2023-11-02 NOTE — PROGRESS NOTES
"Physical Therapy    Physical Therapy Treatment    Patient Name: Paul Prather  MRN: [id number]  Today's Date: 11/6/2023  Time Calculation  Start Time: 1615  Stop Time: 1700  Time Calculation (min): 45 min    Current Problem  Problem List Items Addressed This Visit             ICD-10-CM    Acute sciatica - Primary M54.30    Pain in left knee M25.562        Subjective   Pt overall notes he was seen by his ortho  today for his L knee and noted pain was just inflammation and no structural injury.     Precautions  Precautions  Precautions Comment: none    Pain  Pain Assessment: 0-10  Pain Score: 1  Pain Location: Back      Objective   No measures     Treatments:  Therapeutic Exercise x 45 min   Nustep L4 x 5 min  BL HSS x1 min, standing  RASTA (x10, PRN)  2 x 10 (BL SLR)  2 x 10 (Bridge)  back and forth (Fwd/ retro monster walk along railing) YTB  hip abd, flex and ext YTB x 20 ea  Quarter squat wall slide x 20  Ecc LAQ 2# x 20  Airex march 2 min  Airex tandem stand x 1 min ea   Step up 6\" 2 x 10   Step up lateral  x 20 L LE  Reverse step step up 2\" 2 x 10 L LE  on step    Assessment:  Pt overall tolerated treatment well today and added IT band stretch to the L knee without issues, Progressing well with his low back with no pain noted.     Plan:  Continue to focus on L LE strength and flexibility since low back is improving well.     Goals:   LTG  3. LE strength to at least 4+/5   4. Lumbar AROM to 100% flexion and 75% ext without pain  5. ERNST to 10%  6.  SLS to at least 20 sec on the L  "

## 2023-11-02 NOTE — PROGRESS NOTES
73-year-old is seen for his left knee.  He had left knee arthroscopy September 14, 2023.  He has a mild intermittent throbbing soreness particularly on the medial side of the knee.  About 10 days after surgery he fell when he was walking off of an airplane.  He is used a Medrol Dosepak with some relief and he strained to improve.    Pleasant in no acute distress.  Walks with a mildly antalgic gait.  The portals are well-healed.  Left knee has minimal effusion range of motion 0 to 120 degrees with mild tenderness.    Discussion about his knee was done.  Is gradually improving.  To perform the therapy exercises.  He can use Tylenol or an NSAID.  He does have chondral change along the medial side the knee and if he had persistent symptoms he could return for cortisone injection.

## 2023-11-06 ENCOUNTER — TREATMENT (OUTPATIENT)
Dept: PHYSICAL THERAPY | Facility: CLINIC | Age: 73
End: 2023-11-06
Payer: MEDICARE

## 2023-11-06 ENCOUNTER — APPOINTMENT (OUTPATIENT)
Dept: PHYSICAL THERAPY | Facility: CLINIC | Age: 73
End: 2023-11-06
Payer: MEDICARE

## 2023-11-06 DIAGNOSIS — M54.30 ACUTE SCIATICA: Primary | ICD-10-CM

## 2023-11-06 DIAGNOSIS — M25.562 LEFT KNEE PAIN, UNSPECIFIED CHRONICITY: ICD-10-CM

## 2023-11-06 DIAGNOSIS — F41.1 GENERALIZED ANXIETY DISORDER: ICD-10-CM

## 2023-11-06 PROCEDURE — 97110 THERAPEUTIC EXERCISES: CPT | Mod: GP | Performed by: PHYSICAL THERAPIST

## 2023-11-06 ASSESSMENT — PAIN - FUNCTIONAL ASSESSMENT: PAIN_FUNCTIONAL_ASSESSMENT: 0-10

## 2023-11-06 ASSESSMENT — PAIN SCALES - GENERAL: PAINLEVEL_OUTOF10: 0 - NO PAIN

## 2023-11-06 NOTE — PROGRESS NOTES
"Physical Therapy    Physical Therapy Treatment    Patient Name: Paul Prather  MRN: [id number]  Today's Date: 11/6/2023  Time Calculation  Start Time: 1700  Stop Time: 1745  Time Calculation (min): 45 min    Current Problem  Problem List Items Addressed This Visit             ICD-10-CM    Acute sciatica - Primary M54.30    Relevant Orders    Follow Up In Physical Therapy    Pain in left knee M25.562    Relevant Orders    Follow Up In Physical Therapy        Subjective   Pt notes knee pain comes and goes. Notes he feels his back is doing better but will still have pain with yard work for a day then clears up.     Precautions  Precautions  Precautions Comment: none    Pain  Pain Assessment: 0-10  Pain Score: 0 - No pain (3/10)      Objective   No measures today     Treatments:  Therapeutic Exercise x 45 min   Nustep L4 x 5 min  Calf incline stretch   BL HSS x1 min, standing  RASTA (x10, PRN)  2 x 10 (BL SLR)  2 x 10 (Bridge)  back and forth (Fwd/retro monster walk along railing) YTB  hip abd, flex and ext YTB x 20 ea  Quarter squat wall slide x 20  Ecc LAQ 2# x 20  Airex march 2 min  Airex tandem stand x 1 min ea   Step up 6\" 2 x 10   Step up lateral  x 20 L LE  Reverse step step up 2\" 2 x 10 L LE  on step    Assessment:  Pt overall tolerated treatment well today and is doing well with minimal pain noted in his back and knee. Progressing towards goals with no issues noted.     Plan:  Pt to continue 2 more visits. Progress Wb strength in the L knee.     Goals:    LTG  3. LE strength to at least 4+/5   4. Lumbar AROM to 100% flexion and 75% ext without pain  5. ERNST to 10%  6.  SLS to at least 20 sec on the L  "

## 2023-11-08 RX ORDER — CLONAZEPAM 0.5 MG/1
0.5 TABLET ORAL EVERY 12 HOURS PRN
Qty: 30 TABLET | Refills: 0 | Status: SHIPPED | OUTPATIENT
Start: 2023-11-08 | End: 2023-12-04 | Stop reason: SDUPTHER

## 2023-11-11 ASSESSMENT — ENCOUNTER SYMPTOMS
HEMATOLOGIC/LYMPHATIC NEGATIVE: 1
GASTROINTESTINAL NEGATIVE: 1
NEUROLOGICAL NEGATIVE: 1
ENDOCRINE NEGATIVE: 1
CONSTITUTIONAL NEGATIVE: 1
PSYCHIATRIC NEGATIVE: 1
CARDIOVASCULAR NEGATIVE: 1
RESPIRATORY NEGATIVE: 1
BACK PAIN: 1

## 2023-11-13 ENCOUNTER — APPOINTMENT (OUTPATIENT)
Dept: PHYSICAL THERAPY | Facility: CLINIC | Age: 73
End: 2023-11-13
Payer: MEDICARE

## 2023-11-13 DIAGNOSIS — F98.8 ATTENTION DEFICIT DISORDER, UNSPECIFIED HYPERACTIVITY PRESENCE: ICD-10-CM

## 2023-11-14 RX ORDER — DEXTROAMPHETAMINE SACCHARATE, AMPHETAMINE ASPARTATE MONOHYDRATE, DEXTROAMPHETAMINE SULFATE AND AMPHETAMINE SULFATE 1.25; 1.25; 1.25; 1.25 MG/1; MG/1; MG/1; MG/1
5 CAPSULE, EXTENDED RELEASE ORAL EVERY MORNING
Qty: 30 CAPSULE | Refills: 0 | Status: SHIPPED | OUTPATIENT
Start: 2023-11-14 | End: 2023-12-04 | Stop reason: SDUPTHER

## 2023-11-15 ENCOUNTER — TREATMENT (OUTPATIENT)
Dept: PHYSICAL THERAPY | Facility: CLINIC | Age: 73
End: 2023-11-15
Payer: MEDICARE

## 2023-11-15 DIAGNOSIS — M54.30 ACUTE SCIATICA: ICD-10-CM

## 2023-11-15 DIAGNOSIS — M25.562 LEFT KNEE PAIN, UNSPECIFIED CHRONICITY: ICD-10-CM

## 2023-11-15 PROCEDURE — 97110 THERAPEUTIC EXERCISES: CPT | Mod: GP,CQ

## 2023-11-15 ASSESSMENT — PAIN SCALES - GENERAL
PAINLEVEL_OUTOF10: 2
PAINLEVEL_OUTOF10: 0 - NO PAIN

## 2023-11-15 ASSESSMENT — PAIN - FUNCTIONAL ASSESSMENT: PAIN_FUNCTIONAL_ASSESSMENT: 0-10

## 2023-11-15 NOTE — PROGRESS NOTES
"Physical Therapy    Physical Therapy Treatment    Patient Name: Paul Prather  MRN: [id number]  Today's Date: 11/15/2023  Time Calculation  Start Time: 1130  Stop Time: 1215  Time Calculation (min): 45 min    Current Problem  Problem List Items Addressed This Visit             ICD-10-CM    Acute sciatica M54.30    Pain in left knee M25.562     Subjective   Pt reports low back has been  feeling good lately. No pain. Extension exercises feel good when he does them.  L knee is still really bothering him which is concerning to pt. He had difficulty and significant pain in knee when trying to get up from the floor after cleaning up a mess. He was careful not to kneel on L knee but was in awkward position. Pt driving 8 hrs to visit family for Thanksgiving next week.   Precautions  Precautions  Precautions Comment: none    Pain  Pain Assessment: 0-10  Pain Score: 2 (7/10 at worst)  Pain Location: Knee  Multiple Pain Sites: Two- Back-0/10      Objective   No measures today     Treatments:  Therapeutic Exercise x 40 min   Nustep L4 x 5 min  Calf incline stretch x1 min   BL HSS x1 min, standing  RASTA (x10, PRN)  2 x 10 (BL SLR)  2 x 10 (Bridge)  Monster walk 20 ftx 2 YTB  Inchworm 20 ft x 2 YTB   hip abd, flex and ext YTB x 20 ea  Quarter squat wall slide x 20LAQ 2# x 20  Airex march 2 min  Airex tandem stand x 1 min ea   Step up 6\" 2 x 10   Step up lateral  x 20 L LE  Reverse step step up 4\" 2 x 10 L LE  on step      Manual x 5 min   L patella mobs  TPR to distal ITB     Assessment:  Pt overall progressing with PT. Was able to tolerate increased step height with step downs today with no pain or compensation. Performed tband walks in middle of gym with no UE support and did well aside from some low back discomfort. Pt performed RASTA and discomfort diminished. TTP along L ITB with manual with pt voicing relief with amb following.   Overall good session.   Plan:  Pt to continue 1 more visits. Progress Wb strength in the L knee. "     Goals:    LTG  3. LE strength to at least 4+/5   4. Lumbar AROM to 100% flexion and 75% ext without pain  5. ERNST to 10%  6.  SLS to at least 20 sec on the L

## 2023-11-28 ENCOUNTER — TREATMENT (OUTPATIENT)
Dept: PHYSICAL THERAPY | Facility: CLINIC | Age: 73
End: 2023-11-28
Payer: MEDICARE

## 2023-11-28 DIAGNOSIS — M54.30 ACUTE SCIATICA: ICD-10-CM

## 2023-11-28 DIAGNOSIS — M25.562 LEFT KNEE PAIN, UNSPECIFIED CHRONICITY: Primary | ICD-10-CM

## 2023-11-28 PROCEDURE — 97110 THERAPEUTIC EXERCISES: CPT | Mod: GP | Performed by: PHYSICAL THERAPIST

## 2023-11-28 ASSESSMENT — PAIN - FUNCTIONAL ASSESSMENT: PAIN_FUNCTIONAL_ASSESSMENT: 0-10

## 2023-11-28 NOTE — PROGRESS NOTES
"Physical Therapy    Physical Therapy Treatment/Re-check/Progress Report     Patient Name: Paul Prather  MRN: [id number]  Today's Date: 11/28/2023  Time Calculation  Start Time: 0830  Stop Time: 0912  Time Calculation (min): 42 min    Current Problem  Problem List Items Addressed This Visit             ICD-10-CM    Acute sciatica M54.30    Pain in left knee - Primary M25.562    Relevant Orders    Follow Up In Physical Therapy        Subjective   Pt overall reports his back is doing well and notes no pain today. Notes some pain in his left knee that is slowly improving but still occurs with different activity and crossing the leg. Notes he would like to continue therapy on the left knee.     Precautions  Precautions  Precautions Comment: none    Pain  Pain Assessment: 0-10  Pain Location: Knee (medial kne and patella)  Pain Orientation: Left      Objective   Lower Extremity Strength:  MMT 5/5 max  RIGHT LEFT   Knee Extension 5/5 4+/5   Knee Flexion 5/5 5/5     Lower Extremity ROM: WNL unless documented below:  ROM in Degrees  RIGHT LEFT   Knee Extension 0 -2   Knee Flexion 145 140     Joint mobility:  Patella all ranges hypomobile  Gait mechanics: No gait abnormality noted.    Other Measures  Lower Extremity Funtional Score (LEFS): 56/80  Oswestry Disablity Index (ERNST): 12%     Treatments:  Therapeutic Exercise x 42 min   Nustep L4 x 5 min  Calf incline stretch x1 min   BL HSS x1 min, standing  RASTA (x10, PRN)  2 x 10 (BL SLR)  2 x 10 (Bridge)  Monster walk 20 ftx 2 YTB  Inchworm 20 ft x 2 YTB   hip abd, flex and ext YTB x 20 ea  Quarter squat wall slide x 20  LAQ 2# x 20  Airex march 2 min  Airex tandem stand x 1 min ea   Fitterboard  Step up 6\" 2 x 10   Step up lateral 6\" x 20 L LE  Reverse step step up 4\" 2 x 10 L LE  on step  Objective measures obtained.      Manual x 5 min -held  L patella mobs  TPR to distal ITB     Assessment:  Pt overall doing well with his low back but still having pain in the L knee post op. " Overall still pain medial knee and some weakness noted yet. Pt has met his goals for his low back and progressing with goals for his knee. Pt would benefit from continued therapy with focus on the L knee. Pt to cont at 1x/wk x 4 weeks. Added knee goals today to establish new POC.     Plan:  Pt to continue 1 x/wk x 4 weeks.  Pt to continue with ther-ex, neuro re-ed, manual therapy and HEP.     Goals:  Active       New goals established 11-28-23       Reduce pain at worst to 0-1/10 with all prior activity in the L knee.        Start:  11/28/23    Expected End:  12/28/23            Pt to score an increase of 10 points or > on LEFS to display overall increased function.         Start:  11/28/23    Expected End:  12/28/23            Pt to be independent with a HEP for self management.        Start:  11/28/23    Expected End:  12/28/23            Pt to increase LE strength to grossly 5/5 for improved gait, stairs, lifting and ADL's.        Start:  11/28/23    Expected End:  12/28/23               PT Problem        SLS to at least 30 sec on the L       Start:  11/28/23    Expected End:  12/28/23               LTG-established in [employer] system.   3. LE strength to at least 4+/5 - met   4. Lumbar AROM to 100% flexion and 75% ext without pain-met   5. ERNST to 10%- met

## 2023-12-01 ENCOUNTER — OFFICE VISIT (OUTPATIENT)
Dept: NEPHROLOGY | Facility: CLINIC | Age: 73
End: 2023-12-01
Payer: MEDICARE

## 2023-12-01 VITALS
BODY MASS INDEX: 27.8 KG/M2 | TEMPERATURE: 97.8 F | RESPIRATION RATE: 18 BRPM | DIASTOLIC BLOOD PRESSURE: 67 MMHG | OXYGEN SATURATION: 95 % | HEIGHT: 70 IN | SYSTOLIC BLOOD PRESSURE: 113 MMHG | WEIGHT: 194.2 LBS | HEART RATE: 60 BPM

## 2023-12-01 DIAGNOSIS — N18.30 STAGE 3 CHRONIC KIDNEY DISEASE, UNSPECIFIED WHETHER STAGE 3A OR 3B CKD (MULTI): ICD-10-CM

## 2023-12-01 DIAGNOSIS — I10 HTN (HYPERTENSION), BENIGN: Primary | ICD-10-CM

## 2023-12-01 DIAGNOSIS — Z79.1 NSAID LONG-TERM USE: ICD-10-CM

## 2023-12-01 PROCEDURE — 1159F MED LIST DOCD IN RCRD: CPT | Performed by: NURSE PRACTITIONER

## 2023-12-01 PROCEDURE — 3074F SYST BP LT 130 MM HG: CPT | Performed by: NURSE PRACTITIONER

## 2023-12-01 PROCEDURE — 1125F AMNT PAIN NOTED PAIN PRSNT: CPT | Performed by: NURSE PRACTITIONER

## 2023-12-01 PROCEDURE — 99213 OFFICE O/P EST LOW 20 MIN: CPT | Performed by: NURSE PRACTITIONER

## 2023-12-01 PROCEDURE — 1160F RVW MEDS BY RX/DR IN RCRD: CPT | Performed by: NURSE PRACTITIONER

## 2023-12-01 PROCEDURE — 3078F DIAST BP <80 MM HG: CPT | Performed by: NURSE PRACTITIONER

## 2023-12-04 DIAGNOSIS — F41.1 GENERALIZED ANXIETY DISORDER: ICD-10-CM

## 2023-12-04 DIAGNOSIS — F98.8 ATTENTION DEFICIT DISORDER, UNSPECIFIED HYPERACTIVITY PRESENCE: ICD-10-CM

## 2023-12-06 ENCOUNTER — TREATMENT (OUTPATIENT)
Dept: PHYSICAL THERAPY | Facility: CLINIC | Age: 73
End: 2023-12-06
Payer: MEDICARE

## 2023-12-06 DIAGNOSIS — M25.562 LEFT KNEE PAIN, UNSPECIFIED CHRONICITY: Primary | ICD-10-CM

## 2023-12-06 PROCEDURE — 97110 THERAPEUTIC EXERCISES: CPT | Mod: GP | Performed by: PHYSICAL THERAPIST

## 2023-12-06 RX ORDER — CLONAZEPAM 0.5 MG/1
0.5 TABLET ORAL EVERY 12 HOURS PRN
Qty: 30 TABLET | Refills: 0 | Status: SHIPPED | OUTPATIENT
Start: 2023-12-06 | End: 2024-01-08 | Stop reason: SDUPTHER

## 2023-12-06 RX ORDER — DEXTROAMPHETAMINE SACCHARATE, AMPHETAMINE ASPARTATE MONOHYDRATE, DEXTROAMPHETAMINE SULFATE AND AMPHETAMINE SULFATE 1.25; 1.25; 1.25; 1.25 MG/1; MG/1; MG/1; MG/1
5 CAPSULE, EXTENDED RELEASE ORAL EVERY MORNING
Qty: 30 CAPSULE | Refills: 0 | Status: SHIPPED | OUTPATIENT
Start: 2023-12-06 | End: 2024-01-15 | Stop reason: SDUPTHER

## 2023-12-06 ASSESSMENT — ENCOUNTER SYMPTOMS
NEUROLOGICAL NEGATIVE: 1
PSYCHIATRIC NEGATIVE: 1
GASTROINTESTINAL NEGATIVE: 1
CONSTITUTIONAL NEGATIVE: 1
CARDIOVASCULAR NEGATIVE: 1
RESPIRATORY NEGATIVE: 1
BACK PAIN: 1
HEMATOLOGIC/LYMPHATIC NEGATIVE: 1
ENDOCRINE NEGATIVE: 1

## 2023-12-06 ASSESSMENT — PAIN SCALES - GENERAL: PAINLEVEL_OUTOF10: 2

## 2023-12-06 ASSESSMENT — PAIN - FUNCTIONAL ASSESSMENT: PAIN_FUNCTIONAL_ASSESSMENT: 0-10

## 2023-12-06 NOTE — PROGRESS NOTES
"Physical Therapy    Physical Therapy Treatment    Patient Name: Paul Prather  MRN: [id number]  Today's Date: 12/6/2023  Time Calculation  Start Time: 0830  Stop Time: 0915  Time Calculation (min): 45 min    Current Problem  Problem List Items Addressed This Visit             ICD-10-CM    Pain in left knee - Primary M25.562        Subjective   Pt overall notes his knee feels better today and feels back is doing well. Still notes some pain in the knee though. 2/10.     Precautions  Precautions  Precautions Comment: none    Pain  Pain Assessment: 0-10  Pain Score: 2  Pain Location: Knee  Pain Orientation: Left      Objective   No measures today     Treatments:  Therapeutic Exercise x 40 min   Nustep L4 x 5 min  Calf incline stretch x1 min   Calf soleus stretch x 1 min   BL HSS x1 min, standing  2 x 10 (BL SLR)  2 x 10 (Bridge)  Monster walk 20 ftx 2 YTB  Inchworm 20 ft x 2 YTB   hip abd, flex and ext RTB x 20 ea  Quarter squat wall slide x 20  LAQ 2# x 20  Airex march 2 min  Airex tandem stand x 1 min ea   Fitterboard 2 pox 1.5 min   Step up 6\" 2 x 10   Step up lateral 6\" x 20 L LE  Reverse step step up 4\" 2 x 10 L LE  on step    Manual x 5 min  L patella mobs  TPR to distal ITB     Assessment:  Pt overall progressed with resistance to the left knee program today. Overall less pain is noted and improving strength. Progressing towards goals but still recommended to reach out to his Dr regarding his knee pain.     Plan:  Continue to progress strength and ROM in the L knee as tolerated.     Goals:  Active       New goals established 11-28-23       Reduce pain at worst to 0-1/10 with all prior activity in the L knee.        Start:  11/28/23    Expected End:  12/28/23            Pt to score an increase of 10 points or > on LEFS to display overall increased function.         Start:  11/28/23    Expected End:  12/28/23            Pt to be independent with a HEP for self management.        Start:  11/28/23    Expected End:  " 12/28/23            Pt to increase LE strength to grossly 5/5 for improved gait, stairs, lifting and ADL's.        Start:  11/28/23    Expected End:  12/28/23               PT Problem        SLS to at least 30 sec on the L       Start:  11/28/23    Expected End:  12/28/23

## 2023-12-06 NOTE — PROGRESS NOTES
History Of Present Illness  Paul Prather is a 73 y.o. male with medical history significant for CKD3, HTN, ADHD, chronic back pain, and anxiety who presents for a 2-month fuv for CKD.     Reportedly patient has had followed for CKD at the [facility] since 2016, and the last visit was in March 2023.      Past Medical History  As above.    Surgical History  He has no past surgical history on file.     Social History  He reports that he has quit smoking. His smoking use included cigarettes. He does not have any smokeless tobacco history on file. He reports current alcohol use. He reports that he does not use drugs.    Family History  Family History   Problem Relation Name Age of Onset    Other (arteriosclerotic vascular disease) Father          Allergies  Other and Sulfa (sulfonamide antibiotics)    Review of Systems   Constitutional: Negative.    HENT: Negative.     Respiratory: Negative.     Cardiovascular: Negative.    Gastrointestinal: Negative.    Endocrine: Negative.    Genitourinary: Negative.    Musculoskeletal:  Positive for back pain.   Skin: Negative.    Neurological: Negative.    Hematological: Negative.    Psychiatric/Behavioral: Negative.          Physical Exam  Constitutional:       Appearance: Normal appearance.   HENT:      Head: Normocephalic and atraumatic.      Mouth/Throat:      Mouth: Mucous membranes are moist.   Cardiovascular:      Rate and Rhythm: Normal rate and regular rhythm.      Pulses: Normal pulses.      Heart sounds: Normal heart sounds.   Pulmonary:      Effort: Pulmonary effort is normal.      Breath sounds: Normal breath sounds.   Musculoskeletal:         General: Normal range of motion.   Skin:     General: Skin is warm and dry.   Neurological:      General: No focal deficit present.      Mental Status: He is alert and oriented to person, place, and time.   Psychiatric:         Mood and Affect: Mood normal.         Behavior: Behavior normal.         Thought Content:  "Thought content normal.         Judgment: Judgment normal.          Last Recorded Vitals  Blood pressure 113/67, pulse 60, temperature 36.6 °C (97.8 °F), resp. rate 18, height 1.778 m (5' 10\"), weight 88.1 kg (194 lb 3.2 oz), SpO2 95 %.    Relevant Results  Recent Results (from the past 2016 hour(s))   CBC    Collection Time: 10/27/23  1:54 PM   Result Value Ref Range    WBC 10.1 4.4 - 11.3 x10*3/uL    nRBC 0.0 0.0 - 0.0 /100 WBCs    RBC 4.67 4.50 - 5.90 x10*6/uL    Hemoglobin 15.1 13.5 - 17.5 g/dL    Hematocrit 46.4 41.0 - 52.0 %    MCV 99 80 - 100 fL    MCH 32.3 26.0 - 34.0 pg    MCHC 32.5 32.0 - 36.0 g/dL    RDW 13.2 11.5 - 14.5 %    Platelets 255 150 - 450 x10*3/uL    MPV 11.9 (H) 7.5 - 11.5 fL   Basic Metabolic Panel    Collection Time: 10/27/23  1:54 PM   Result Value Ref Range    Glucose 70 (L) 74 - 99 mg/dL    Sodium 139 136 - 145 mmol/L    Potassium 4.6 3.5 - 5.3 mmol/L    Chloride 104 98 - 107 mmol/L    Bicarbonate 28 21 - 32 mmol/L    Anion Gap 12 10 - 20 mmol/L    Urea Nitrogen 32 (H) 6 - 23 mg/dL    Creatinine 1.67 (H) 0.50 - 1.30 mg/dL    eGFR 43 (L) >60 mL/min/1.73m*2    Calcium 9.8 8.6 - 10.6 mg/dL   Vitamin D 25-Hydroxy,Total (for eval of Vitamin D levels)    Collection Time: 10/27/23  1:54 PM   Result Value Ref Range    Vitamin D, 25-Hydroxy, Total 41 30 - 100 ng/mL   Parathyroid Hormone, Intact    Collection Time: 10/27/23  1:54 PM   Result Value Ref Range    Parathyroid Hormone, Intact 32.7 18.5 - 88.0 pg/mL   Extra Urine Gray Tube    Collection Time: 10/27/23  1:54 PM   Result Value Ref Range    Extra Tube Hold for add-ons.    Urinalysis with Reflex Microscopic    Collection Time: 10/30/23  9:17 AM   Result Value Ref Range    Color, Urine Yellow Straw, Yellow    Appearance, Urine Clear Clear    Specific Gravity, Urine 1.015 1.005 - 1.035    pH, Urine 5.0 5.0, 5.5, 6.0, 6.5, 7.0, 7.5, 8.0    Protein, Urine NEGATIVE NEGATIVE mg/dL    Glucose, Urine NEGATIVE NEGATIVE mg/dL    Blood, Urine SMALL " (1+) (A) NEGATIVE    Ketones, Urine NEGATIVE NEGATIVE mg/dL    Bilirubin, Urine NEGATIVE NEGATIVE    Urobilinogen, Urine <2.0 <2.0 mg/dL    Nitrite, Urine NEGATIVE NEGATIVE    Leukocyte Esterase, Urine NEGATIVE NEGATIVE   Microscopic Only, Urine    Collection Time: 10/30/23  9:17 AM   Result Value Ref Range    WBC, Urine NONE 1-5, NONE /HPF    RBC, Urine 1-2 NONE, 1-2, 3-5 /HPF    Mucus, Urine 1+ Reference range not established. /LPF   Urinalysis with Reflex Microscopic and Culture    Collection Time: 10/30/23  2:28 PM   Result Value Ref Range    Color, Urine Yellow Straw, Yellow    Appearance, Urine Clear Clear    Specific Gravity, Urine 1.015 1.005 - 1.035    pH, Urine 5.0 5.0, 5.5, 6.0, 6.5, 7.0, 7.5, 8.0    Protein, Urine NEGATIVE NEGATIVE mg/dL    Glucose, Urine NEGATIVE NEGATIVE mg/dL    Blood, Urine SMALL (1+) (A) NEGATIVE    Ketones, Urine NEGATIVE NEGATIVE mg/dL    Bilirubin, Urine NEGATIVE NEGATIVE    Urobilinogen, Urine <2.0 <2.0 mg/dL    Nitrite, Urine NEGATIVE NEGATIVE    Leukocyte Esterase, Urine NEGATIVE NEGATIVE   Extra Urine Gray Tube    Collection Time: 10/30/23  2:28 PM   Result Value Ref Range    Extra Tube Hold for add-ons.    Albumin , Urine Random    Collection Time: 10/30/23  2:28 PM   Result Value Ref Range    Albumin, Urine Random 16.0 Not established mg/L    Creatinine, Urine Random 104.0 20.0 - 370.0 mg/dL    Albumin/Creatine Ratio 15.4 <30.0 ug/mg Creat   Urinalysis Microscopic    Collection Time: 10/30/23  2:28 PM   Result Value Ref Range    WBC, Urine 1-5 1-5, NONE /HPF    RBC, Urine 1-2 NONE, 1-2, 3-5 /HPF    Mucus, Urine 1+ Reference range not established. /LPF         Assessment/Plan   73 y.o. male with medical history significant for CKD3, HTN, ADHD, chronic back pain, and anxiety who presents for a 2-month fuv for CKD. Reportedly he has had followed for CKD at the [facility] since 2016, and the last visit was in March 2023.     # CKD3: baseline sCr 1.6 - 2.0 mg/dL  "with eGFR 35 - 47 ml/min/1.73m2 since at least . 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 Per note from the [facility], \"renal biopsy showed 10-15% tubular atrophy/interstitial fibrosis with some glomerulosclerosis\". Renal US done on 22 showed left renal nonobstructive calculus and small cyst otherwise unremarkable. Most recent sCr 1.67 mg/dL (10/27/23) - at baseline. Currently stable.    # HTN: well controlled with current regimen.    # Long-term NSAIDs use: stopped ~ 3 months ago.    Plan:  - Continue to follow up with PCP for optimal HTN management.  - No changes with current medications.  - Reviewed strategies for preserving remaining kidney function includin) Avoidance of NSAIDs including Aleve (Naprosyn), Motrin (Ibuprofen), Mobic (Meloxicam), Celebrex (Celecoxib) and aspirin as well as PPI acid blocking medications such as Prilosec (omeprazole), Protonix (pantoprazole), and Nexium (esomeprazole), as well as other nephrotoxic agents.   2) Avoidance of tobacco or alcohol use.   3) Adequate hydration daily.   4) Blood pressure target 130/80 mmHg.   5) Daily dietary sodium intake less than 2 grams per day.    6) Maintain healthy lifestyle. Healthy diet and regular exercises.   7) Maintain ideal weight.  - FUV: in 6 months or sooner if concerns arise.     Patient verbalized understanding of above. He will not hesitate to contact Division of Nephrology at [phone] with concerns/questions.    I spent 20 minutes in the professional and overall care of this patient.      Dilcia Barnard, APRN-CNP    "

## 2023-12-11 ENCOUNTER — TREATMENT (OUTPATIENT)
Dept: PHYSICAL THERAPY | Facility: CLINIC | Age: 73
End: 2023-12-11
Payer: MEDICARE

## 2023-12-11 DIAGNOSIS — M25.562 LEFT KNEE PAIN, UNSPECIFIED CHRONICITY: Primary | ICD-10-CM

## 2023-12-11 PROCEDURE — 97110 THERAPEUTIC EXERCISES: CPT | Mod: GP | Performed by: PHYSICAL THERAPIST

## 2023-12-11 ASSESSMENT — PAIN - FUNCTIONAL ASSESSMENT: PAIN_FUNCTIONAL_ASSESSMENT: 0-10

## 2023-12-11 NOTE — PROGRESS NOTES
"Physical Therapy    Physical Therapy Treatment    Patient Name: Paul Prather  MRN: [id number]  Today's Date: 12/11/2023  Time Calculation  Start Time: 0830  Stop Time: 0915  Time Calculation (min): 45 min    Current Problem  Problem List Items Addressed This Visit             ICD-10-CM    Pain in left knee - Primary M25.562        Subjective   Pt overall notes some difficulty getting up out of chair with the L knee but overall notes he is doing better.    Precautions  Precautions  Precautions Comment: none    Pain  Pain Assessment: 0-10  Pain Location: Knee  Pain Orientation: Left      Objective   No measures today    Treatments:  Therapeutic Exercise x 45 min   Nustep L4 x 5 min  Calf incline stretch x1 min   Calf soleus stretch x 1 min   BL HSS x1 min, standing  2 x 10 (BL SLR)  2 x 10 (Bridge)  Monster walk 20 ftx 2 YTB  Inchworm 20 ft x 2 YTB   hip abd, flex and ext RTB x 20 ea  Quarter squat wall slide x 20  LAQ 2# x 20  Airex march 2 min  Airex tandem stand x 1 min ea   Fitterboard 2 pox x 2 min   Step up 6\" 2 x 10   Step up lateral 6\" x 20 L LE  Reverse step step up 4\" 2 x 10 L LE  on step    Assessment:  Pt overall tolerated treatment well and is progressing with LE strength. Progressing towards his goals for his L knee.     Plan:  Pt to continue 2 more visits then discharge to HEP.    Goals:  Active       New goals established 11-28-23       Reduce pain at worst to 0-1/10 with all prior activity in the L knee.        Start:  11/28/23    Expected End:  12/28/23            Pt to score an increase of 10 points or > on LEFS to display overall increased function.         Start:  11/28/23    Expected End:  12/28/23            Pt to be independent with a HEP for self management.        Start:  11/28/23    Expected End:  12/28/23            Pt to increase LE strength to grossly 5/5 for improved gait, stairs, lifting and ADL's.        Start:  11/28/23    Expected End:  12/28/23               PT Problem        SLS to at " least 30 sec on the L       Start:  11/28/23    Expected End:  12/28/23

## 2023-12-19 ENCOUNTER — TREATMENT (OUTPATIENT)
Dept: PHYSICAL THERAPY | Facility: CLINIC | Age: 73
End: 2023-12-19
Payer: MEDICARE

## 2023-12-19 DIAGNOSIS — M25.562 LEFT KNEE PAIN, UNSPECIFIED CHRONICITY: Primary | ICD-10-CM

## 2023-12-19 PROCEDURE — 97110 THERAPEUTIC EXERCISES: CPT | Mod: GP | Performed by: PHYSICAL THERAPIST

## 2023-12-19 ASSESSMENT — PAIN - FUNCTIONAL ASSESSMENT: PAIN_FUNCTIONAL_ASSESSMENT: 0-10

## 2023-12-19 ASSESSMENT — PAIN SCALES - GENERAL: PAINLEVEL_OUTOF10: 2

## 2023-12-19 NOTE — PROGRESS NOTES
"Physical Therapy Treatment    Patient Name: Paul Prather  MRN: [id number]  Today's Date: 12/19/2023  Time Calculation  Start Time: 1625  Stop Time: 1705  Time Calculation (min): 40 min    Current Problem  Problem List Items Addressed This Visit             ICD-10-CM    Pain in left knee - Primary M25.562      Subjective   Pt overall report she feels still some pain in the L knee but has not set up an appointment with his ortho yet.    Precautions  Precautions  Precautions Comment: none    Pain  Pain Assessment: 0-10  Pain Score: 2  Pain Location: Knee  Pain Orientation: Left    Objective   No measures     Treatments:  Therapeutic Exercise x 40 min   Nustep L4 x 5 min  Calf incline stretch x1 min   Calf soleus stretch x 1 min   BL HSS x1 min, standing  2 x 10 (BL SLR)  2 x 10 (Bridge)  Monster walk 20 ftx 2 YTB  Inchworm 20 ft x 2 YTB   hip abd, flex and ext RTB x 20 ea  Quarter squat wall slide x 20  LAQ 2# x 20  Airex march 2 min  Airex tandem stand x 1 min ea   Fitterboard 2 pox x 2 min   Step up 6\" 2 x 10   Step up lateral 6\" x 20 L LE  Reverse step step up 6\" 2 x 10 L LE  on step  TKE GTB 2 x 10     Assessment:  Pt overall tolerated treatment with no increased pain today with exercises performed. Overall increased strength noted in the L LE and improved balance as well.     Plan:  1 more visit in 2 weeks then discharge to ongoing HEP and follow up with his ortho.     Goals:  Active       New goals established 11-28-23       Reduce pain at worst to 0-1/10 with all prior activity in the L knee.        Start:  11/28/23    Expected End:  12/28/23            Pt to score an increase of 10 points or > on LEFS to display overall increased function.         Start:  11/28/23    Expected End:  12/28/23            Pt to be independent with a HEP for self management.        Start:  11/28/23    Expected End:  12/28/23            Pt to increase LE strength to grossly 5/5 for improved gait, stairs, lifting and ADL's.        Start: "  11/28/23    Expected End:  12/28/23               PT Problem        SLS to at least 30 sec on the L       Start:  11/28/23    Expected End:  12/28/23

## 2023-12-27 ENCOUNTER — APPOINTMENT (OUTPATIENT)
Dept: PHYSICAL THERAPY | Facility: CLINIC | Age: 73
End: 2023-12-27
Payer: MEDICARE

## 2024-01-08 DIAGNOSIS — F41.1 GENERALIZED ANXIETY DISORDER: ICD-10-CM

## 2024-01-09 RX ORDER — CLONAZEPAM 0.5 MG/1
0.5 TABLET ORAL EVERY 12 HOURS PRN
Qty: 30 TABLET | Refills: 0 | Status: SHIPPED | OUTPATIENT
Start: 2024-01-09 | End: 2024-02-06 | Stop reason: SDUPTHER

## 2024-01-11 ENCOUNTER — TREATMENT (OUTPATIENT)
Dept: PHYSICAL THERAPY | Facility: CLINIC | Age: 74
End: 2024-01-11
Payer: MEDICARE

## 2024-01-11 DIAGNOSIS — M25.562 LEFT KNEE PAIN, UNSPECIFIED CHRONICITY: Primary | ICD-10-CM

## 2024-01-11 PROCEDURE — 97110 THERAPEUTIC EXERCISES: CPT | Mod: GP | Performed by: PHYSICAL THERAPIST

## 2024-01-11 ASSESSMENT — PAIN - FUNCTIONAL ASSESSMENT: PAIN_FUNCTIONAL_ASSESSMENT: 0-10

## 2024-01-11 ASSESSMENT — PAIN SCALES - GENERAL: PAINLEVEL_OUTOF10: 2

## 2024-01-11 NOTE — PROGRESS NOTES
"Physical Therapy    Physical Therapy Treatment/Discharge     Patient Name: Paul Prather  MRN: [id number]  Today's Date: 1/11/2024  Time Calculation  Start Time: 1620  Stop Time: 1705  Time Calculation (min): 45 min    Current Problem  Problem List Items Addressed This Visit             ICD-10-CM    Pain in left knee - Primary M25.562        Subjective   Pt overall still noting pain in the L knee but notes his back is still doing well. Improving strength is reported.   Compliance with HEP-yes     Precautions  Precautions  Precautions Comment: none    Pain  Pain Assessment: 0-10  Pain Score: 2  Pain Location: Knee  Pain Orientation: Left    Objective   Lower Extremity Strength:  MMT 5/5 max  RIGHT LEFT   Knee Extension 5/5 5/5   Knee Flexion 5/5 5/5     Lower Extremity ROM: WNL unless documented below:  ROM in Degrees  RIGHT LEFT   Knee Extension 0 0   Knee Flexion 145 140     Joint mobility:  Patella all ranges hypomobile  Gait mechanics: No gait abnormality noted.    Other Measures  Lower Extremity Funtional Score (LEFS): 29/80  Oswestry Disablity Index (ERNST): 14%     Treatments:  Therapeutic Exercise x 45 min   Nustep L4 x 5 min  Calf incline stretch x1 min   Calf soleus stretch x 1 min   BL HSS x1 min, standing  2 x 10 (BL SLR)  2 x 10 (Bridge)  Monster walk 20 ftx 2 YTB  Inchworm 20 ft x 2 YTB   hip abd, flex and ext RTB x 20 ea  Quarter squat wall slide x 20  LAQ 2# x 20  Airex march 2 min  Airex tandem stand x 1 min ea   Fitterboard 2 pox x 2 min   Step up 6\" 2 x 10   Step up lateral 6\" x 20 L LE  Reverse step step up 6\" 2 x 10 L LE  on step  TKE GTB 2 x 10     Updated HEP:  Access Code: [email or web address]  URL: [email or web address]  Date: 01/11/2024  Prepared by: SHASHANK Mercado    Exercises  - Step Up  - 1 x daily - 4 x weekly - 2 sets - 10 reps  - Lateral Step Up (Mirrored)  - 1 x daily - 4 x weekly - 2 sets - 10 reps  - Forward Step Down (Mirrored)  - 1 x daily - 4 x weekly - 2 sets - 10 reps  - Side " Stepping with Resistance at Ankles  - 1 x daily - 4 x weekly - 2 sets - 10 reps  - Forward Monster Walks  - 1 x daily - 4 x weekly - 2 sets - 10 reps  - Hip Abduction with Resistance Loop  - 1 x daily - 4 x weekly - 2 sets - 10 reps  - Hip Extension with Resistance Loop  - 1 x daily - 4 x weekly - 2 sets - 10 reps  - Standing Hip Flexion with Resistance Loop  - 1 x daily - 4 x weekly - 2 sets - 10 reps  - Wall Quarter Squat  - 1 x daily - 4 x weekly - 2 sets - 10 reps  - Supine Bridge  - 1 x daily - 4 x weekly - 2 sets - 10 reps  - Prone Press Up  - 1 x daily - 7 x weekly - 2 sets - 10 reps  - Standing Lumbar Extension  - 1 x daily - 7 x weekly - 2 sets - 10 reps  Assessment:  Pt overall has done well with his low back and radicular pain but still having pain in the L knee. Overall improved LE strength and ROM in the knee as well as function but pain persists. Overall met goals for strength and ROM and for his low back. Pt will be D/C'ed form therapy since no further progress in knee pain is noted. Pt to continue with his HEP. Pt has a fair prognosis for his knee and a good prognosis for his low back going forward.     Plan:  discharge to [facility] today     Goals:  Active       New goals established 11-28-23       Reduce pain at worst to 0-1/10 with all prior activity in the L knee.  (Not Progressing)       Start:  11/28/23    Expected End:  12/28/23            Pt to score an increase of 10 points or > on LEFS to display overall increased function.   (Not Progressing)       Start:  11/28/23    Expected End:  12/28/23            Pt to be independent with a HEP for self management.  (Met)       Start:  11/28/23    Expected End:  12/28/23    Resolved:  01/11/24         Pt to increase LE strength to grossly 5/5 for improved gait, stairs, lifting and ADL's.  (Met)       Start:  11/28/23    Expected End:  12/28/23    Resolved:  01/11/24           Resolved       PT Problem        SLS to at least 30 sec on the L (Met)        Start:  11/28/23    Expected End:  12/28/23    Resolved:  01/11/24

## 2024-01-15 ENCOUNTER — HOSPITAL ENCOUNTER (OUTPATIENT)
Dept: CARDIOLOGY | Facility: CLINIC | Age: 74
Discharge: HOME | End: 2024-01-15
Payer: MEDICARE

## 2024-01-15 DIAGNOSIS — I47.10 PAROXYSMAL SUPRAVENTRICULAR TACHYCARDIA (CMS-HCC): ICD-10-CM

## 2024-01-15 DIAGNOSIS — I35.1 AORTIC VALVE INSUFFICIENCY, ETIOLOGY OF CARDIAC VALVE DISEASE UNSPECIFIED: ICD-10-CM

## 2024-01-15 DIAGNOSIS — F98.8 ATTENTION DEFICIT DISORDER, UNSPECIFIED HYPERACTIVITY PRESENCE: ICD-10-CM

## 2024-01-15 LAB
AORTIC VALVE MEAN GRADIENT: 7.5
AORTIC VALVE PEAK VELOCITY: 1.92
AV PEAK GRADIENT: 14.8
AVA (PEAK VEL): 1.81
AVA (VTI): 2.12
EJECTION FRACTION APICAL 4 CHAMBER: 61.5
EJECTION FRACTION: 56
LEFT ATRIUM VOLUME AREA LENGTH INDEX BSA: 36.9
LEFT VENTRICLE INTERNAL DIMENSION DIASTOLE: 4.5 (ref 3.5–6)
LEFT VENTRICULAR OUTFLOW TRACT DIAMETER: 2.35
MITRAL VALVE E/A RATIO: 0.7
MITRAL VALVE E/E' RATIO: 18.17
RIGHT VENTRICLE FREE WALL PEAK S': 0.11
TRICUSPID ANNULAR PLANE SYSTOLIC EXCURSION: 2.2

## 2024-01-15 PROCEDURE — 93306 TTE W/DOPPLER COMPLETE: CPT | Performed by: STUDENT IN AN ORGANIZED HEALTH CARE EDUCATION/TRAINING PROGRAM

## 2024-01-15 PROCEDURE — 93306 TTE W/DOPPLER COMPLETE: CPT

## 2024-01-15 PROCEDURE — 93225 XTRNL ECG REC<48 HRS REC: CPT

## 2024-01-15 PROCEDURE — 93227 XTRNL ECG REC<48 HR R&I: CPT | Performed by: INTERNAL MEDICINE

## 2024-01-15 RX ORDER — DEXTROAMPHETAMINE SACCHARATE, AMPHETAMINE ASPARTATE MONOHYDRATE, DEXTROAMPHETAMINE SULFATE AND AMPHETAMINE SULFATE 1.25; 1.25; 1.25; 1.25 MG/1; MG/1; MG/1; MG/1
5 CAPSULE, EXTENDED RELEASE ORAL EVERY MORNING
Qty: 30 CAPSULE | Refills: 0 | Status: SHIPPED | OUTPATIENT
Start: 2024-01-15 | End: 2024-02-12 | Stop reason: SDUPTHER

## 2024-01-23 ENCOUNTER — TELEPHONE (OUTPATIENT)
Dept: PRIMARY CARE | Facility: CLINIC | Age: 74
End: 2024-01-23
Payer: MEDICARE

## 2024-02-01 DIAGNOSIS — F41.1 GENERALIZED ANXIETY DISORDER: ICD-10-CM

## 2024-02-05 DIAGNOSIS — K92.1 BLOOD IN STOOL: Primary | ICD-10-CM

## 2024-02-06 DIAGNOSIS — I47.10 SVT (SUPRAVENTRICULAR TACHYCARDIA) (CMS-HCC): ICD-10-CM

## 2024-02-06 DIAGNOSIS — I47.9 TACHYCARDIA, PAROXYSMAL (MULTI): ICD-10-CM

## 2024-02-06 RX ORDER — TRAZODONE HYDROCHLORIDE 150 MG/1
150 TABLET ORAL NIGHTLY
Qty: 90 TABLET | Refills: 1 | Status: SHIPPED | OUTPATIENT
Start: 2024-02-06 | End: 2024-02-08 | Stop reason: SDUPTHER

## 2024-02-06 RX ORDER — CLONAZEPAM 0.5 MG/1
0.5 TABLET ORAL EVERY 12 HOURS PRN
Qty: 30 TABLET | Refills: 0 | Status: SHIPPED | OUTPATIENT
Start: 2024-02-06 | End: 2024-02-08 | Stop reason: SDUPTHER

## 2024-02-06 RX ORDER — METOPROLOL SUCCINATE 25 MG/1
25 TABLET, EXTENDED RELEASE ORAL DAILY
Qty: 90 TABLET | Refills: 3 | Status: SHIPPED | OUTPATIENT
Start: 2024-02-06

## 2024-02-07 DIAGNOSIS — F41.1 GENERALIZED ANXIETY DISORDER: ICD-10-CM

## 2024-02-08 RX ORDER — TRAZODONE HYDROCHLORIDE 150 MG/1
150 TABLET ORAL NIGHTLY
Qty: 90 TABLET | Refills: 1 | OUTPATIENT
Start: 2024-02-08

## 2024-02-08 RX ORDER — CLONAZEPAM 0.5 MG/1
0.5 TABLET ORAL EVERY 12 HOURS PRN
Qty: 30 TABLET | Refills: 0 | OUTPATIENT
Start: 2024-02-08

## 2024-02-08 RX ORDER — TRAZODONE HYDROCHLORIDE 150 MG/1
150 TABLET ORAL NIGHTLY
Qty: 90 TABLET | Refills: 1 | Status: SHIPPED | OUTPATIENT
Start: 2024-02-08

## 2024-02-08 RX ORDER — CLONAZEPAM 0.5 MG/1
0.5 TABLET ORAL EVERY 12 HOURS PRN
Qty: 30 TABLET | Refills: 0 | Status: SHIPPED | OUTPATIENT
Start: 2024-02-08 | End: 2024-03-08 | Stop reason: SDUPTHER

## 2024-02-12 DIAGNOSIS — F98.8 ATTENTION DEFICIT DISORDER, UNSPECIFIED HYPERACTIVITY PRESENCE: ICD-10-CM

## 2024-02-12 RX ORDER — DEXTROAMPHETAMINE SACCHARATE, AMPHETAMINE ASPARTATE MONOHYDRATE, DEXTROAMPHETAMINE SULFATE AND AMPHETAMINE SULFATE 1.25; 1.25; 1.25; 1.25 MG/1; MG/1; MG/1; MG/1
5 CAPSULE, EXTENDED RELEASE ORAL EVERY MORNING
Qty: 30 CAPSULE | Refills: 0 | Status: SHIPPED | OUTPATIENT
Start: 2024-02-12 | End: 2024-03-11 | Stop reason: SDUPTHER

## 2024-02-23 ENCOUNTER — APPOINTMENT (OUTPATIENT)
Dept: NEPHROLOGY | Facility: CLINIC | Age: 74
End: 2024-02-23
Payer: MEDICARE

## 2024-03-08 DIAGNOSIS — F41.1 GENERALIZED ANXIETY DISORDER: ICD-10-CM

## 2024-03-08 RX ORDER — CLONAZEPAM 0.5 MG/1
0.5 TABLET ORAL EVERY 12 HOURS PRN
Qty: 30 TABLET | Refills: 0 | Status: SHIPPED | OUTPATIENT
Start: 2024-03-08 | End: 2024-04-09 | Stop reason: SDUPTHER

## 2024-03-11 DIAGNOSIS — F98.8 ATTENTION DEFICIT DISORDER, UNSPECIFIED HYPERACTIVITY PRESENCE: ICD-10-CM

## 2024-03-11 RX ORDER — DEXTROAMPHETAMINE SACCHARATE, AMPHETAMINE ASPARTATE MONOHYDRATE, DEXTROAMPHETAMINE SULFATE AND AMPHETAMINE SULFATE 1.25; 1.25; 1.25; 1.25 MG/1; MG/1; MG/1; MG/1
5 CAPSULE, EXTENDED RELEASE ORAL EVERY MORNING
Qty: 30 CAPSULE | Refills: 0 | Status: SHIPPED | OUTPATIENT
Start: 2024-03-11 | End: 2024-04-15 | Stop reason: ALTCHOICE

## 2024-03-19 PROBLEM — F32.1 DEPRESSION, MAJOR, SINGLE EPISODE, MODERATE (MULTI): Status: RESOLVED | Noted: 2023-04-13 | Resolved: 2024-03-19

## 2024-03-19 PROBLEM — I47.9 TACHYCARDIA, PAROXYSMAL (MULTI): Status: RESOLVED | Noted: 2023-04-13 | Resolved: 2024-03-19

## 2024-03-19 PROBLEM — R00.0 TACHYCARDIA: Status: RESOLVED | Noted: 2023-04-13 | Resolved: 2024-03-19

## 2024-03-22 ENCOUNTER — APPOINTMENT (OUTPATIENT)
Dept: GASTROENTEROLOGY | Facility: CLINIC | Age: 74
End: 2024-03-22
Payer: MEDICARE

## 2024-03-25 ENCOUNTER — OFFICE VISIT (OUTPATIENT)
Dept: CARDIOLOGY | Facility: CLINIC | Age: 74
End: 2024-03-25
Payer: MEDICARE

## 2024-03-25 VITALS
DIASTOLIC BLOOD PRESSURE: 70 MMHG | BODY MASS INDEX: 28.06 KG/M2 | HEART RATE: 74 BPM | SYSTOLIC BLOOD PRESSURE: 124 MMHG | HEIGHT: 70 IN | OXYGEN SATURATION: 97 % | WEIGHT: 196 LBS

## 2024-03-25 DIAGNOSIS — I47.10 SVT (SUPRAVENTRICULAR TACHYCARDIA) (CMS-HCC): Primary | ICD-10-CM

## 2024-03-25 DIAGNOSIS — I10 HTN (HYPERTENSION), BENIGN: ICD-10-CM

## 2024-03-25 PROCEDURE — 3078F DIAST BP <80 MM HG: CPT | Performed by: INTERNAL MEDICINE

## 2024-03-25 PROCEDURE — 3074F SYST BP LT 130 MM HG: CPT | Performed by: INTERNAL MEDICINE

## 2024-03-25 PROCEDURE — 1159F MED LIST DOCD IN RCRD: CPT | Performed by: INTERNAL MEDICINE

## 2024-03-25 PROCEDURE — 99213 OFFICE O/P EST LOW 20 MIN: CPT | Performed by: INTERNAL MEDICINE

## 2024-03-25 RX ORDER — LINACLOTIDE 145 UG/1
145 CAPSULE, GELATIN COATED ORAL
COMMUNITY
Start: 2024-03-13 | End: 2024-04-15 | Stop reason: SDUPTHER

## 2024-03-25 NOTE — ASSESSMENT & PLAN NOTE
1.  Atrial tachycardia: This patient is having brief runs of atrial tachycardia but is entirely asymptomatic.  We will continue the metoprolol at 25 mg once daily.  From a cardiac perspective the Adderall dosage can be increased for the patient if needed.    2.  Hypertension: The blood pressure appears under good control on the current dose of lisinopril.  Cardiology follow-up in 1 year.

## 2024-03-25 NOTE — LETTER
"March 25, 2024     Aayush Lopez MD  [address]  [facility], [address]  [place] [address]    Patient: Paul Prather   YOB: 1950   Date of Visit: 3/25/2024       Dear Dr. Aayush Lopez MD:    Thank you for referring Paul Prather to me for evaluation. Below are my notes for this consultation.  If you have questions, please do not hesitate to call me. I look forward to following your patient along with you.       Sincerely,     James C Ramicone, DO      CC: No Recipients  ______________________________________________________________________________________    History Of Present Illness:      This is a 73-year-old male with episodes of atrial tachycardia.  He has no complaints of palpitations, chest pain, or shortness of breath.  He was last seen in the office in September 2023.    The patient has been doing well on low-dose metoprolol.  He used an extended Holter monitor in January 2024.  The patient has indicated that he would feel better if he could take a higher dose of Adderall, and he has been taking Adderall for many years without significant side effects.  He previously was noted to have brief episodes of atrial tachycardia but is asymptomatic from a cardiac perspective.    Review of Systems  Other review of systems negative     Last Recorded Vitals:      9/7/2023     2:33 PM 9/13/2023    11:45 AM 10/20/2023     2:04 PM 10/25/2023     4:29 PM 11/2/2023    10:22 AM 12/1/2023    10:19 AM 3/25/2024    10:03 AM   Vitals   Systolic 110  108 122  113 124   Diastolic 80  72 72  67 70   Heart Rate 70  70 66  60 74   Temp    36.2 °C (97.1 °F)  36.6 °C (97.8 °F)    Resp      18    Height (in) 1.753 m (5' 9\") 1.778 m (5' 10\") 1.778 m (5' 10\") 1.778 m (5' 10\") 1.778 m (5' 10\") 1.778 m (5' 10\") 1.778 m (5' 10\")   Weight (lb) 197 198.63 191.58 190 192 194.2 196   BMI 29.09 kg/m2 28.5 kg/m2 27.49 kg/m2 27.26 kg/m2 27.55 kg/m2 27.86 kg/m2 28.12 kg/m2   BSA (m2) 2.09 m2 2.11 m2 2.07 m2 2.06 m2 " 2.07 m2 2.09 m2 2.1 m2   Visit Report Report  Report    Report Report Report Report Report          Allergies:  Other and Sulfa (sulfonamide antibiotics)    Outpatient Medications:  Current Outpatient Medications   Medication Instructions   • amphetamine-dextroamphetamine XR (Adderall XR) 5 mg 24 hr capsule 5 mg, oral, Every morning, Do not crush or chew.   • clonazePAM (KLONOPIN) 0.5 mg, oral, Every 12 hours PRN   • fluticasone (Flonase) 50 mcg/actuation nasal spray 1 spray, Each Nostril, Daily, Shake gently. Before first use, prime pump. After use, clean tip and replace cap.   • latanoprost (Xalatan) 0.005 % ophthalmic solution 1 drop, Both Eyes, Daily   • Linzess 145 mcg, oral, Daily before breakfast   • lisinopril 30 mg, oral, Daily   • metoprolol succinate XL (TOPROL-XL) 25 mg, oral, Daily   • multivit-minerals/folic acid (CENTRUM ADULTS ORAL) No dose, route, or frequency recorded.   • traZODone (DESYREL) 150 mg, oral, Nightly   • venlafaxine XR (EFFEXOR-XR) 150 mg, oral, Daily       Physical Exam:    General Appearance:  Alert, oriented, no distress  Skin:  Warm and dry  Head and Neck:  No elevation of JVP, no carotid bruits  Cardiac Exam:  Rhythm is regular, S1 and S2 are normal, no murmur S3 or S4  Lungs:  Clear to auscultation  Extremities:  no edema  Neurologic:  No focal deficits  Psychiatric:  Appropriate mood and behavior         Cardiology Tests:  I have personally review the diagnostic cardiac testing and my interpretation is as follows:    EKG November 9, 2022: Sinus tachycardia  Echocardiogram December 2022: Normal left ventricular function with mild aortic valve regurgitation  Holter monitor January 15, 2024: Sinus rhythm with brief runs of atrial tachycardia up to 9 beats in duration  Echocardiogram January 2024: Normal left ventricular function, mild aortic valve regurgitation      Assessment/Plan  Problem List Items Addressed This Visit             ICD-10-CM    SVT (supraventricular tachycardia)  - Primary I47.10     1.  Atrial tachycardia: This patient is having brief runs of atrial tachycardia but is entirely asymptomatic.  We will continue the metoprolol at 25 mg once daily.  From a cardiac perspective the Adderall dosage can be increased for the patient if needed.    2.  Hypertension: The blood pressure appears under good control on the current dose of lisinopril.  Cardiology follow-up in 1 year.         HTN (hypertension), benign I10       James C Ramicone,

## 2024-03-25 NOTE — PROGRESS NOTES
"History Of Present Illness:      This is a 73-year-old male with episodes of atrial tachycardia.  He has no complaints of palpitations, chest pain, or shortness of breath.  He was last seen in the office in September 2023.    The patient has been doing well on low-dose metoprolol.  He used an extended Holter monitor in January 2024.  The patient has indicated that he would feel better if he could take a higher dose of Adderall, and he has been taking Adderall for many years without significant side effects.  He previously was noted to have brief episodes of atrial tachycardia but is asymptomatic from a cardiac perspective.    Review of Systems  Other review of systems negative     Last Recorded Vitals:      9/7/2023     2:33 PM 9/13/2023    11:45 AM 10/20/2023     2:04 PM 10/25/2023     4:29 PM 11/2/2023    10:22 AM 12/1/2023    10:19 AM 3/25/2024    10:03 AM   Vitals   Systolic 110  108 122  113 124   Diastolic 80  72 72  67 70   Heart Rate 70  70 66  60 74   Temp    36.2 °C (97.1 °F)  36.6 °C (97.8 °F)    Resp      18    Height (in) 1.753 m (5' 9\") 1.778 m (5' 10\") 1.778 m (5' 10\") 1.778 m (5' 10\") 1.778 m (5' 10\") 1.778 m (5' 10\") 1.778 m (5' 10\")   Weight (lb) 197 198.63 191.58 190 192 194.2 196   BMI 29.09 kg/m2 28.5 kg/m2 27.49 kg/m2 27.26 kg/m2 27.55 kg/m2 27.86 kg/m2 28.12 kg/m2   BSA (m2) 2.09 m2 2.11 m2 2.07 m2 2.06 m2 2.07 m2 2.09 m2 2.1 m2   Visit Report Report  Report    Report Report Report Report Report          Allergies:  Other and Sulfa (sulfonamide antibiotics)    Outpatient Medications:  Current Outpatient Medications   Medication Instructions    amphetamine-dextroamphetamine XR (Adderall XR) 5 mg 24 hr capsule 5 mg, oral, Every morning, Do not crush or chew.    clonazePAM (KLONOPIN) 0.5 mg, oral, Every 12 hours PRN    fluticasone (Flonase) 50 mcg/actuation nasal spray 1 spray, Each Nostril, Daily, Shake gently. Before first use, prime pump. After use, clean tip and replace cap.    latanoprost " (Xalatan) 0.005 % ophthalmic solution 1 drop, Both Eyes, Daily    Linzess 145 mcg, oral, Daily before breakfast    lisinopril 30 mg, oral, Daily    metoprolol succinate XL (TOPROL-XL) 25 mg, oral, Daily    multivit-minerals/folic acid (CENTRUM ADULTS ORAL) No dose, route, or frequency recorded.    traZODone (DESYREL) 150 mg, oral, Nightly    venlafaxine XR (EFFEXOR-XR) 150 mg, oral, Daily       Physical Exam:    General Appearance:  Alert, oriented, no distress  Skin:  Warm and dry  Head and Neck:  No elevation of JVP, no carotid bruits  Cardiac Exam:  Rhythm is regular, S1 and S2 are normal, no murmur S3 or S4  Lungs:  Clear to auscultation  Extremities:  no edema  Neurologic:  No focal deficits  Psychiatric:  Appropriate mood and behavior         Cardiology Tests:  I have personally review the diagnostic cardiac testing and my interpretation is as follows:    EKG November 9, 2022: Sinus tachycardia  Echocardiogram December 2022: Normal left ventricular function with mild aortic valve regurgitation  Holter monitor January 15, 2024: Sinus rhythm with brief runs of atrial tachycardia up to 9 beats in duration  Echocardiogram January 2024: Normal left ventricular function, mild aortic valve regurgitation      Assessment/Plan   Problem List Items Addressed This Visit             ICD-10-CM    SVT (supraventricular tachycardia) - Primary I47.10     1.  Atrial tachycardia: This patient is having brief runs of atrial tachycardia but is entirely asymptomatic.  We will continue the metoprolol at 25 mg once daily.  From a cardiac perspective the Adderall dosage can be increased for the patient if needed.    2.  Hypertension: The blood pressure appears under good control on the current dose of lisinopril.  Cardiology follow-up in 1 year.         HTN (hypertension), benign I10       James C Ramicone, DO

## 2024-04-09 DIAGNOSIS — F41.1 GENERALIZED ANXIETY DISORDER: ICD-10-CM

## 2024-04-09 RX ORDER — CLONAZEPAM 0.5 MG/1
0.5 TABLET ORAL EVERY 12 HOURS PRN
Qty: 30 TABLET | Refills: 0 | Status: SHIPPED | OUTPATIENT
Start: 2024-04-09 | End: 2024-05-02 | Stop reason: SDUPTHER

## 2024-04-15 ENCOUNTER — OFFICE VISIT (OUTPATIENT)
Dept: PRIMARY CARE | Facility: CLINIC | Age: 74
End: 2024-04-15
Payer: MEDICARE

## 2024-04-15 VITALS
HEART RATE: 61 BPM | TEMPERATURE: 97.7 F | HEIGHT: 70 IN | OXYGEN SATURATION: 96 % | SYSTOLIC BLOOD PRESSURE: 122 MMHG | WEIGHT: 195 LBS | DIASTOLIC BLOOD PRESSURE: 76 MMHG | BODY MASS INDEX: 27.92 KG/M2

## 2024-04-15 DIAGNOSIS — K59.00 CONSTIPATION, UNSPECIFIED CONSTIPATION TYPE: ICD-10-CM

## 2024-04-15 DIAGNOSIS — M25.569 KNEE PAIN, UNSPECIFIED CHRONICITY, UNSPECIFIED LATERALITY: ICD-10-CM

## 2024-04-15 DIAGNOSIS — N18.31 STAGE 3A CHRONIC KIDNEY DISEASE (MULTI): ICD-10-CM

## 2024-04-15 DIAGNOSIS — F98.8 ATTENTION DEFICIT DISORDER, UNSPECIFIED HYPERACTIVITY PRESENCE: Primary | ICD-10-CM

## 2024-04-15 DIAGNOSIS — L98.9 SKIN LESION: ICD-10-CM

## 2024-04-15 DIAGNOSIS — Z00.00 MEDICARE ANNUAL WELLNESS VISIT, SUBSEQUENT: ICD-10-CM

## 2024-04-15 DIAGNOSIS — F32.5 DEPRESSION, MAJOR, IN REMISSION (CMS-HCC): ICD-10-CM

## 2024-04-15 PROCEDURE — 99214 OFFICE O/P EST MOD 30 MIN: CPT | Performed by: INTERNAL MEDICINE

## 2024-04-15 PROCEDURE — G0439 PPPS, SUBSEQ VISIT: HCPCS | Performed by: INTERNAL MEDICINE

## 2024-04-15 PROCEDURE — 1159F MED LIST DOCD IN RCRD: CPT | Performed by: INTERNAL MEDICINE

## 2024-04-15 PROCEDURE — 1170F FXNL STATUS ASSESSED: CPT | Performed by: INTERNAL MEDICINE

## 2024-04-15 PROCEDURE — 1160F RVW MEDS BY RX/DR IN RCRD: CPT | Performed by: INTERNAL MEDICINE

## 2024-04-15 PROCEDURE — 3078F DIAST BP <80 MM HG: CPT | Performed by: INTERNAL MEDICINE

## 2024-04-15 PROCEDURE — 3074F SYST BP LT 130 MM HG: CPT | Performed by: INTERNAL MEDICINE

## 2024-04-15 RX ORDER — DEXTROAMPHETAMINE SACCHARATE, AMPHETAMINE ASPARTATE MONOHYDRATE, DEXTROAMPHETAMINE SULFATE AND AMPHETAMINE SULFATE 5; 5; 5; 5 MG/1; MG/1; MG/1; MG/1
20 CAPSULE, EXTENDED RELEASE ORAL EVERY MORNING
Qty: 30 CAPSULE | Refills: 0 | Status: SHIPPED | OUTPATIENT
Start: 2024-04-15 | End: 2024-05-13 | Stop reason: SDUPTHER

## 2024-04-15 RX ORDER — LINACLOTIDE 145 UG/1
145 CAPSULE, GELATIN COATED ORAL
Qty: 90 CAPSULE | Refills: 3 | Status: SHIPPED | OUTPATIENT
Start: 2024-04-15 | End: 2024-05-13 | Stop reason: SDUPTHER

## 2024-04-15 ASSESSMENT — ACTIVITIES OF DAILY LIVING (ADL)
BATHING: INDEPENDENT
TAKING_MEDICATION: INDEPENDENT
DOING_HOUSEWORK: INDEPENDENT
MANAGING_FINANCES: INDEPENDENT
DRESSING: INDEPENDENT
GROCERY_SHOPPING: INDEPENDENT

## 2024-04-15 ASSESSMENT — PATIENT HEALTH QUESTIONNAIRE - PHQ9
1. LITTLE INTEREST OR PLEASURE IN DOING THINGS: NOT AT ALL
2. FEELING DOWN, DEPRESSED OR HOPELESS: NOT AT ALL
SUM OF ALL RESPONSES TO PHQ9 QUESTIONS 1 AND 2: 0

## 2024-04-15 ASSESSMENT — ENCOUNTER SYMPTOMS
OCCASIONAL FEELINGS OF UNSTEADINESS: 0
LOSS OF SENSATION IN FEET: 0
DEPRESSION: 0

## 2024-04-15 NOTE — PROGRESS NOTES
"Subjective   Patient ID: Paul Prather is a 73 y.o. male who presents for Medicare Annual Wellness Visit Subsequent (6 month follow up. Pt is concerned with weight loss, 2 weeks ago he was 199lbs ).    HPI     Review of Systems    Objective   /76   Pulse 61   Temp 36.5 °C (97.7 °F)   Ht 1.778 m (5' 10\")   Wt 88.5 kg (195 lb)   SpO2 96%   BMI 27.98 kg/m²     Physical Exam  Gen nad, affect wnl  Heentt eomfg, face symmetric, ncat  Neck w/o la, tm, bruit  Lungs clear   Cv rrr nl s1, s2  Ext w/o edema  Neuro grossly nonfocal  Skin good color    Assessment/Plan   Diagnoses and all orders for this visit:  Attention deficit disorder, unspecified hyperactivity presence  -     amphetamine-dextroamphetamine XR (Adderall XR) 20 mg 24 hr capsule; Take 1 capsule (20 mg) by mouth once daily in the morning. Do not crush or chew.  Medicare annual wellness visit, subsequent  Depression, major, in remission (CMS-[facility])  Stage 3a chronic kidney disease (Multi)  Constipation, unspecified constipation type  -     Linzess 145 mcg capsule; Take 1 capsule (145 mcg) by mouth once daily in the morning. Take before meals.  Knee pain, unspecified chronicity, unspecified laterality  -     Referral to Orthopaedic Surgery; Future       "

## 2024-04-17 ENCOUNTER — HOSPITAL ENCOUNTER (OUTPATIENT)
Dept: RADIOLOGY | Facility: CLINIC | Age: 74
Discharge: HOME | End: 2024-04-17
Payer: MEDICARE

## 2024-04-17 ENCOUNTER — OFFICE VISIT (OUTPATIENT)
Dept: ORTHOPEDIC SURGERY | Facility: CLINIC | Age: 74
End: 2024-04-17
Payer: MEDICARE

## 2024-04-17 DIAGNOSIS — M25.569 KNEE PAIN, UNSPECIFIED CHRONICITY, UNSPECIFIED LATERALITY: ICD-10-CM

## 2024-04-17 DIAGNOSIS — G89.29 CHRONIC PAIN OF BOTH KNEES: ICD-10-CM

## 2024-04-17 DIAGNOSIS — M17.0 ARTHRITIS OF BOTH KNEES: ICD-10-CM

## 2024-04-17 DIAGNOSIS — M25.561 RIGHT KNEE PAIN: ICD-10-CM

## 2024-04-17 DIAGNOSIS — M25.561 RIGHT KNEE PAIN: Primary | ICD-10-CM

## 2024-04-17 DIAGNOSIS — M25.561 CHRONIC PAIN OF BOTH KNEES: ICD-10-CM

## 2024-04-17 DIAGNOSIS — M25.562 CHRONIC PAIN OF BOTH KNEES: ICD-10-CM

## 2024-04-17 PROCEDURE — 99213 OFFICE O/P EST LOW 20 MIN: CPT | Performed by: ORTHOPAEDIC SURGERY

## 2024-04-17 PROCEDURE — 1159F MED LIST DOCD IN RCRD: CPT | Performed by: ORTHOPAEDIC SURGERY

## 2024-04-17 PROCEDURE — 20610 DRAIN/INJ JOINT/BURSA W/O US: CPT | Performed by: ORTHOPAEDIC SURGERY

## 2024-04-17 PROCEDURE — 73562 X-RAY EXAM OF KNEE 3: CPT | Mod: RIGHT SIDE | Performed by: RADIOLOGY

## 2024-04-17 PROCEDURE — 1036F TOBACCO NON-USER: CPT | Performed by: ORTHOPAEDIC SURGERY

## 2024-04-17 PROCEDURE — 73562 X-RAY EXAM OF KNEE 3: CPT | Mod: RT

## 2024-04-17 PROCEDURE — 1160F RVW MEDS BY RX/DR IN RCRD: CPT | Performed by: ORTHOPAEDIC SURGERY

## 2024-04-17 RX ORDER — LIDOCAINE HYDROCHLORIDE 10 MG/ML
2 INJECTION INFILTRATION; PERINEURAL
Status: COMPLETED | OUTPATIENT
Start: 2024-04-17 | End: 2024-04-17

## 2024-04-17 RX ORDER — TRIAMCINOLONE ACETONIDE 40 MG/ML
40 INJECTION, SUSPENSION INTRA-ARTICULAR; INTRAMUSCULAR
Status: COMPLETED | OUTPATIENT
Start: 2024-04-17 | End: 2024-04-17

## 2024-04-17 RX ADMIN — LIDOCAINE HYDROCHLORIDE 2 ML: 10 INJECTION INFILTRATION; PERINEURAL at 10:24

## 2024-04-17 RX ADMIN — TRIAMCINOLONE ACETONIDE 40 MG: 40 INJECTION, SUSPENSION INTRA-ARTICULAR; INTRAMUSCULAR at 10:24

## 2024-04-17 ASSESSMENT — ENCOUNTER SYMPTOMS
KNEE DEFORMITY: 1
KNEE SWELLING: 1

## 2024-04-17 NOTE — PROGRESS NOTES
Subjective    Patient ID: Paul Pratehr is a 73 y.o. male.    Chief Complaint: Pain of the Right Knee and Follow-up of the Left Knee       This is a 73-year-old male who presents for evaluation of bilateral knee pain.  Presently left somewhat worse than right.  He experiences stiffness after prolonged sitting and pain with functions of ADL such as standing, walking and especially with stair climbing as well as descending stairs.  There is been no specific injury.  He did undergo a left knee arthroscopy in 2023 for torn medial meniscus.  That surgical procedure was of some limited benefit.  There has been no new injury.  He has not noted any redness warmth and denies any fevers or chills.  The pain has resulted in some loss of motion.  He has not noted swelling.  Occasionally he does have a mechanical symptom.    This patient's past medical, social, and family history were reviewed as well as a review of systems including updates on the patient's information encounter sheet  Patient does have a history of kidney disease with a creatinine level approximately 1.7  Patient should not take nonsteroid anti-inflammatory drugs secondary to his kidney disease    X-rays performed today and reviewed at length with the patient demonstrate each knee joint is satisfactory aligned.  Joint spaces between the distal femur and tibia both medial and laterally are well-maintained.  On the lateral view of the right knee there is some mild narrowing of the patellofemoral joint space    Physical Examination  Constitutional: Patient's height and weight reviewed, appears well kempt  Psychiatric: Alert and oriented ×3, appropriate mood and behavior  Pulmonary: Breathing appears nonlabored, no apparent distress  Lymphatic: No appreciable lymphadenopathy to both the upper and lower extremities  Skin: No open lesions, rashes, ulcerations  Neurologic: Gross motor and sensory exam appear intact (except for abnormalities noted in the below muscle  skeletal exam)    Musculoskeletal: Satisfactory range of motion of each hip without groin or thigh pain elicited.  Each knee demonstrates full active extension of flexion on the right to 110 degrees on the left 115 degrees.  Each knee demonstrates patellofemoral crepitus throughout her range of motion slightly greater in the right knee than the left knee.  The right knee has no significant medial joint line tenderness by the left knee has some mild joint line tenderness.  Negative Juan Antonio examination.    Assessment: Bilateral patellofemoral arthritis of the knees    Plan: An extended discussion ensued with the patient regarding their bilateral knee arthritic condition. Both nonoperative and operative treatment options were discussed. The patient will continue with modifications of activities as well as an exercise program. As the patient desired a intra-articular injection of Kenalog/lidocaine were performed into each knee today under sterile conditions and tolerated well. The patient will observe to see if these injections are benefit and follow up with us on a when necessary basis.    Right Knee     Left Knee     L Inj/Asp: R knee on 4/17/2024 10:24 AM  Indications: pain  Details: 22 G needle, anterolateral approach  Medications: 40 mg triamcinolone acetonide 40 mg/mL; 2 mL lidocaine 10 mg/mL (1 %)  Consent was given by the patient. Patient was prepped and draped in the usual sterile fashion.       L Inj/Asp: L knee on 4/17/2024 10:24 AM  Indications: pain  Details: 22 G needle, anterolateral approach  Medications: 40 mg triamcinolone acetonide 40 mg/mL; 2 mL lidocaine 10 mg/mL (1 %)  Consent was given by the patient. Patient was prepped and draped in the usual sterile fashion.             Current Outpatient Medications:     amphetamine-dextroamphetamine XR (Adderall XR) 20 mg 24 hr capsule, Take 1 capsule (20 mg) by mouth once daily in the morning. Do not crush or chew., Disp: 30 capsule, Rfl: 0    clonazePAM  (KlonoPIN) 0.5 mg tablet, Take 1 tablet (0.5 mg) by mouth every 12 hours if needed for anxiety. (Patient not taking: Reported on 4/15/2024), Disp: 30 tablet, Rfl: 0    fluticasone (Flonase) 50 mcg/actuation nasal spray, Administer 1 spray into each nostril once daily. Shake gently. Before first use, prime pump. After use, clean tip and replace cap., Disp: , Rfl:     latanoprost (Xalatan) 0.005 % ophthalmic solution, Administer 1 drop into both eyes once daily., Disp: , Rfl:     Linzess 145 mcg capsule, Take 1 capsule (145 mcg) by mouth once daily in the morning. Take before meals., Disp: 90 capsule, Rfl: 3    lisinopril 30 mg tablet, Take 1 tablet (30 mg) by mouth once daily., Disp: 90 tablet, Rfl: 3    metoprolol succinate XL (Toprol-XL) 25 mg 24 hr tablet, Take 1 tablet (25 mg) by mouth once daily., Disp: 90 tablet, Rfl: 3    multivit-minerals/folic acid (CENTRUM ADULTS ORAL), , Disp: , Rfl:     traZODone (Desyrel) 150 mg tablet, Take 1 tablet (150 mg) by mouth once daily at bedtime., Disp: 90 tablet, Rfl: 1    venlafaxine XR (Effexor-XR) 150 mg 24 hr capsule, Take 1 capsule (150 mg) by mouth once daily., Disp: 90 capsule, Rfl: 3

## 2024-05-02 DIAGNOSIS — F41.1 GENERALIZED ANXIETY DISORDER: ICD-10-CM

## 2024-05-02 RX ORDER — CLONAZEPAM 0.5 MG/1
0.5 TABLET ORAL EVERY 12 HOURS PRN
Qty: 30 TABLET | Refills: 0 | Status: SHIPPED | OUTPATIENT
Start: 2024-05-02 | End: 2024-06-03 | Stop reason: SDUPTHER

## 2024-05-06 NOTE — OP NOTE
SURGEON:  Azael Preciado MD    PREOPERATIVE DIAGNOSIS:    Left knee medial meniscus tear.    POSTOPERATIVE DIAGNOSIS:    Left knee medial meniscus tear.    PROCEDURES:    Left knee arthroscopy, partial medial meniscectomy.    ASSISTANT:    Thompson Vizcaino SA.    ANESTHESIA:    General.    INDICATIONS:    A 73-year-old with left knee pain and MRI demonstrating medial  meniscus tear.  He has been through conservative treatment, but has  persistent pain.  Treatment options including no treatment reviewed  and the decision was made to proceed with surgery.     DESCRIPTION OF PROCEDURE:    The patient was brought in the operating room.  General anesthesia  was performed.  Under sterile conditions, knee was injected with  Marcaine and Astramorph.  He was positioned, prepped and draped in  usual fashion.  Anterolateral and anteromedial arthroscopy portals  were used.  Arthroscopic examination of joint was performed.  There  was grade 2 chondromalacia along the patella and the trochlea.  No  loose bodies in the medial and lateral gutters.  In the medial  compartment, there was complex tearing involving the body and  posterior horn of the medial meniscus.  There was a 1 x 4 mm area of  grade 4 chondromalacia along the posteromedial femoral condyle.  There was adjacent grade 2 to 3 chondromalacia on the posteromedial  femoral condyle.  Grade 1 chondromalacia along the tibial plateau.  In the notch, the ACL and PCL were intact.  In the lateral  compartment, the lateral meniscus was carefully probed and visualized  and was intact.  There was grade 1 chondromalacia of the tibial  plateau and intact cartilage on the femoral condyle.  Attention was  turned to the medial compartment.  Using combination of the shaver  and punches, partial medial meniscectomy was performed until smooth  and stable edge was obtained.  Remaining meniscus was probed and was  stable.  The knee was well irrigated.  Additional local  anesthetic  was injected.  The instruments removed.  Steri-Strips and sterile  dressing were applied.  He tolerated the procedure well, taken to  recovery room in stable condition.     ESTIMATED BLOOD LOSS:    There was minimal blood loss.    SPECIMEN:    No specimen.    NO COMPLICATIONS:    No complications.    ANTIBIOTICS:    He received the appropriate preoperative antibiotic within 1 hour of  the skin incision.       Azael Preciado MD    DD:  09/14/2023 11:23:11 EST  DT:  09/14/2023 13:17:41 EST  DICTATION NUMBER:  [id number]  INTERNAL JOB NUMBER:  [id number]             Electronic Signatures:  Azael Preciado (MD) (Signed on 15-Sep-2023 10:34)   Authored  Unsigned, Draft (SYS GENERATED) (Entered on 14-Sep-2023 13:17)   Entered    Last Updated: 15-Sep-2023 10:34 by Azael Preciado)

## 2024-05-13 DIAGNOSIS — K59.00 CONSTIPATION, UNSPECIFIED CONSTIPATION TYPE: ICD-10-CM

## 2024-05-13 DIAGNOSIS — F98.8 ATTENTION DEFICIT DISORDER, UNSPECIFIED HYPERACTIVITY PRESENCE: ICD-10-CM

## 2024-05-14 RX ORDER — LINACLOTIDE 145 UG/1
145 CAPSULE, GELATIN COATED ORAL
Qty: 90 CAPSULE | Refills: 3 | Status: SHIPPED | OUTPATIENT
Start: 2024-05-14 | End: 2025-05-14

## 2024-05-14 RX ORDER — DEXTROAMPHETAMINE SACCHARATE, AMPHETAMINE ASPARTATE MONOHYDRATE, DEXTROAMPHETAMINE SULFATE AND AMPHETAMINE SULFATE 5; 5; 5; 5 MG/1; MG/1; MG/1; MG/1
20 CAPSULE, EXTENDED RELEASE ORAL EVERY MORNING
Qty: 30 CAPSULE | Refills: 0 | Status: SHIPPED | OUTPATIENT
Start: 2024-05-14 | End: 2024-06-13

## 2024-06-03 ENCOUNTER — DOCUMENTATION (OUTPATIENT)
Dept: PRIMARY CARE | Facility: CLINIC | Age: 74
End: 2024-06-03
Payer: MEDICARE

## 2024-06-03 DIAGNOSIS — F32.A ANXIETY AND DEPRESSION: ICD-10-CM

## 2024-06-03 DIAGNOSIS — F41.1 GENERALIZED ANXIETY DISORDER: ICD-10-CM

## 2024-06-03 DIAGNOSIS — F41.9 ANXIETY AND DEPRESSION: ICD-10-CM

## 2024-06-03 RX ORDER — VENLAFAXINE HYDROCHLORIDE 150 MG/1
150 CAPSULE, EXTENDED RELEASE ORAL DAILY
Qty: 90 CAPSULE | Refills: 3 | Status: SHIPPED | OUTPATIENT
Start: 2024-06-03

## 2024-06-03 RX ORDER — CLONAZEPAM 0.5 MG/1
0.5 TABLET ORAL EVERY 12 HOURS PRN
Qty: 30 TABLET | Refills: 0 | Status: SHIPPED | OUTPATIENT
Start: 2024-06-03

## 2024-06-03 NOTE — PROGRESS NOTES
Cpap broken  Needs new machine  Sleep study reviewed  Apap 4-20 w/ heated humidifier and mask of choice ordere

## 2024-06-07 ENCOUNTER — APPOINTMENT (OUTPATIENT)
Dept: NEPHROLOGY | Facility: CLINIC | Age: 74
End: 2024-06-07
Payer: MEDICARE

## 2024-06-13 DIAGNOSIS — F98.8 ATTENTION DEFICIT DISORDER, UNSPECIFIED HYPERACTIVITY PRESENCE: ICD-10-CM

## 2024-06-13 RX ORDER — DEXTROAMPHETAMINE SACCHARATE, AMPHETAMINE ASPARTATE MONOHYDRATE, DEXTROAMPHETAMINE SULFATE AND AMPHETAMINE SULFATE 5; 5; 5; 5 MG/1; MG/1; MG/1; MG/1
20 CAPSULE, EXTENDED RELEASE ORAL EVERY MORNING
Qty: 30 CAPSULE | Refills: 0 | Status: SHIPPED | OUTPATIENT
Start: 2024-06-13 | End: 2024-07-13

## 2024-07-01 DIAGNOSIS — F41.1 GENERALIZED ANXIETY DISORDER: ICD-10-CM

## 2024-07-01 DIAGNOSIS — F98.8 ATTENTION DEFICIT DISORDER, UNSPECIFIED HYPERACTIVITY PRESENCE: ICD-10-CM

## 2024-07-01 RX ORDER — CLONAZEPAM 0.5 MG/1
0.5 TABLET ORAL EVERY 12 HOURS PRN
Qty: 30 TABLET | Refills: 0 | Status: SHIPPED | OUTPATIENT
Start: 2024-07-01

## 2024-07-01 RX ORDER — DEXTROAMPHETAMINE SACCHARATE, AMPHETAMINE ASPARTATE MONOHYDRATE, DEXTROAMPHETAMINE SULFATE AND AMPHETAMINE SULFATE 5; 5; 5; 5 MG/1; MG/1; MG/1; MG/1
20 CAPSULE, EXTENDED RELEASE ORAL EVERY MORNING
Qty: 10 CAPSULE | Refills: 0 | Status: SHIPPED | OUTPATIENT
Start: 2024-07-01 | End: 2024-07-31

## 2024-07-01 NOTE — TELEPHONE ENCOUNTER
Rx for Adderall was sent to [facility] pharmacy on 6/14. [facility] only had 20 tablets, pt picked up. Pt needs another 10 sent to [facility] since the whole 30 could be filled on 6/14.

## 2024-07-12 ENCOUNTER — APPOINTMENT (OUTPATIENT)
Dept: NEPHROLOGY | Facility: CLINIC | Age: 74
End: 2024-07-12
Payer: MEDICARE

## 2024-07-12 VITALS
BODY MASS INDEX: 26.31 KG/M2 | OXYGEN SATURATION: 93 % | WEIGHT: 183.8 LBS | DIASTOLIC BLOOD PRESSURE: 63 MMHG | HEART RATE: 54 BPM | TEMPERATURE: 97.4 F | HEIGHT: 70 IN | SYSTOLIC BLOOD PRESSURE: 121 MMHG

## 2024-07-12 DIAGNOSIS — Z79.1 NSAID LONG-TERM USE: ICD-10-CM

## 2024-07-12 DIAGNOSIS — I10 HTN (HYPERTENSION), BENIGN: ICD-10-CM

## 2024-07-12 DIAGNOSIS — N18.30 STAGE 3 CHRONIC KIDNEY DISEASE, UNSPECIFIED WHETHER STAGE 3A OR 3B CKD (MULTI): Primary | ICD-10-CM

## 2024-07-12 PROCEDURE — 1159F MED LIST DOCD IN RCRD: CPT | Performed by: NURSE PRACTITIONER

## 2024-07-12 PROCEDURE — 3078F DIAST BP <80 MM HG: CPT | Performed by: NURSE PRACTITIONER

## 2024-07-12 PROCEDURE — 99213 OFFICE O/P EST LOW 20 MIN: CPT | Performed by: NURSE PRACTITIONER

## 2024-07-12 PROCEDURE — 3074F SYST BP LT 130 MM HG: CPT | Performed by: NURSE PRACTITIONER

## 2024-07-15 DIAGNOSIS — F98.8 ATTENTION DEFICIT DISORDER, UNSPECIFIED HYPERACTIVITY PRESENCE: ICD-10-CM

## 2024-07-15 RX ORDER — DEXTROAMPHETAMINE SACCHARATE, AMPHETAMINE ASPARTATE MONOHYDRATE, DEXTROAMPHETAMINE SULFATE AND AMPHETAMINE SULFATE 5; 5; 5; 5 MG/1; MG/1; MG/1; MG/1
20 CAPSULE, EXTENDED RELEASE ORAL EVERY MORNING
Qty: 10 CAPSULE | Refills: 0 | OUTPATIENT
Start: 2024-07-15 | End: 2024-08-14

## 2024-07-15 RX ORDER — DEXTROAMPHETAMINE SACCHARATE, AMPHETAMINE ASPARTATE MONOHYDRATE, DEXTROAMPHETAMINE SULFATE AND AMPHETAMINE SULFATE 5; 5; 5; 5 MG/1; MG/1; MG/1; MG/1
20 CAPSULE, EXTENDED RELEASE ORAL EVERY MORNING
Qty: 10 CAPSULE | Refills: 0 | Status: SHIPPED | OUTPATIENT
Start: 2024-07-15 | End: 2024-07-18 | Stop reason: SDUPTHER

## 2024-07-18 ENCOUNTER — APPOINTMENT (OUTPATIENT)
Dept: PRIMARY CARE | Facility: CLINIC | Age: 74
End: 2024-07-18
Payer: MEDICARE

## 2024-07-18 ENCOUNTER — APPOINTMENT (OUTPATIENT)
Dept: DERMATOLOGY | Facility: CLINIC | Age: 74
End: 2024-07-18
Payer: MEDICARE

## 2024-07-18 VITALS
BODY MASS INDEX: 26.05 KG/M2 | HEIGHT: 70 IN | WEIGHT: 182 LBS | SYSTOLIC BLOOD PRESSURE: 122 MMHG | OXYGEN SATURATION: 96 % | HEART RATE: 63 BPM | DIASTOLIC BLOOD PRESSURE: 72 MMHG

## 2024-07-18 DIAGNOSIS — F41.1 GENERALIZED ANXIETY DISORDER: ICD-10-CM

## 2024-07-18 DIAGNOSIS — F98.8 ATTENTION DEFICIT DISORDER, UNSPECIFIED HYPERACTIVITY PRESENCE: ICD-10-CM

## 2024-07-18 DIAGNOSIS — I10 HTN (HYPERTENSION), BENIGN: ICD-10-CM

## 2024-07-18 DIAGNOSIS — L81.4 LENTIGO: ICD-10-CM

## 2024-07-18 DIAGNOSIS — F90.9 ATTENTION DEFICIT HYPERACTIVITY DISORDER (ADHD), UNSPECIFIED ADHD TYPE: ICD-10-CM

## 2024-07-18 DIAGNOSIS — I47.10 SVT (SUPRAVENTRICULAR TACHYCARDIA) (CMS-HCC): Primary | ICD-10-CM

## 2024-07-18 DIAGNOSIS — D18.01 HEMANGIOMA OF SKIN: ICD-10-CM

## 2024-07-18 DIAGNOSIS — Z12.5 SCREENING PSA (PROSTATE SPECIFIC ANTIGEN): ICD-10-CM

## 2024-07-18 DIAGNOSIS — H53.8 BLURRY VISION: ICD-10-CM

## 2024-07-18 DIAGNOSIS — Z12.83 ENCOUNTER FOR SCREENING FOR MALIGNANT NEOPLASM OF SKIN: ICD-10-CM

## 2024-07-18 DIAGNOSIS — L82.1 SEBORRHEIC KERATOSIS: ICD-10-CM

## 2024-07-18 DIAGNOSIS — D48.5 NEOPLASM OF UNCERTAIN BEHAVIOR OF SKIN: Primary | ICD-10-CM

## 2024-07-18 PROCEDURE — 1160F RVW MEDS BY RX/DR IN RCRD: CPT | Performed by: NURSE PRACTITIONER

## 2024-07-18 PROCEDURE — 3074F SYST BP LT 130 MM HG: CPT | Performed by: INTERNAL MEDICINE

## 2024-07-18 PROCEDURE — 3078F DIAST BP <80 MM HG: CPT | Performed by: INTERNAL MEDICINE

## 2024-07-18 PROCEDURE — 1159F MED LIST DOCD IN RCRD: CPT | Performed by: INTERNAL MEDICINE

## 2024-07-18 PROCEDURE — 1124F ACP DISCUSS-NO DSCNMKR DOCD: CPT | Performed by: INTERNAL MEDICINE

## 2024-07-18 PROCEDURE — 1159F MED LIST DOCD IN RCRD: CPT | Performed by: NURSE PRACTITIONER

## 2024-07-18 PROCEDURE — 99203 OFFICE O/P NEW LOW 30 MIN: CPT | Performed by: NURSE PRACTITIONER

## 2024-07-18 PROCEDURE — 99214 OFFICE O/P EST MOD 30 MIN: CPT | Performed by: INTERNAL MEDICINE

## 2024-07-18 PROCEDURE — 1036F TOBACCO NON-USER: CPT | Performed by: NURSE PRACTITIONER

## 2024-07-18 RX ORDER — CLONAZEPAM 0.5 MG/1
0.5 TABLET ORAL EVERY 12 HOURS PRN
Qty: 30 TABLET | Refills: 0 | Status: SHIPPED | OUTPATIENT
Start: 2024-07-18

## 2024-07-18 RX ORDER — LISINOPRIL 30 MG/1
30 TABLET ORAL DAILY
Qty: 90 TABLET | Refills: 3 | Status: SHIPPED | OUTPATIENT
Start: 2024-07-18

## 2024-07-18 RX ORDER — TRAZODONE HYDROCHLORIDE 150 MG/1
150 TABLET ORAL NIGHTLY
Qty: 90 TABLET | Refills: 1 | Status: SHIPPED | OUTPATIENT
Start: 2024-07-18

## 2024-07-18 RX ORDER — DEXTROAMPHETAMINE SACCHARATE, AMPHETAMINE ASPARTATE MONOHYDRATE, DEXTROAMPHETAMINE SULFATE AND AMPHETAMINE SULFATE 5; 5; 5; 5 MG/1; MG/1; MG/1; MG/1
20 CAPSULE, EXTENDED RELEASE ORAL EVERY MORNING
Qty: 30 CAPSULE | Refills: 0 | Status: SHIPPED | OUTPATIENT
Start: 2024-07-18 | End: 2024-08-17

## 2024-07-18 NOTE — PROGRESS NOTES
"Subjective   Patient ID: Paul Prather is a 74 y.o. male who presents for Follow-up.    HPI fu nilsa, add, h/o svt, dep, ckd  Doing ok  Meds seem to be helping a lot    Review of Systems  As above  No cp, sob, palps  Some blurry vision    Objective   /72   Pulse 63   Ht 1.778 m (5' 10\")   Wt 82.6 kg (182 lb)   SpO2 96%   BMI 26.11 kg/m²     Physical Exam  Gen nad, affect wnl  Heentt eomfg, face symmetric, ncat  Lungs clear   Cv rrr nl s1, s2  Ext w/o edema  Neuro grossly nonfocal  Skin good color    Assessment/Plan   Diagnoses and all orders for this visit:  SVT (supraventricular tachycardia) (CMS-HCC)  -     Lipid Panel; Future  -     CT cardiac scoring wo IV contrast; Future  Attention deficit disorder, unspecified hyperactivity presence  -     amphetamine-dextroamphetamine XR (Adderall XR) 20 mg 24 hr capsule; Take 1 capsule (20 mg) by mouth once daily in the morning. Do not crush or chew.  HTN (hypertension), benign  -     lisinopril 30 mg tablet; Take 1 tablet (30 mg) by mouth once daily.  -     Comprehensive metabolic panel; Future  -     Lipid Panel; Future  -     TSH with reflex to Free T4 if abnormal; Future  Generalized anxiety disorder  -     clonazePAM (KlonoPIN) 0.5 mg tablet; Take 1 tablet (0.5 mg) by mouth every 12 hours if needed for anxiety.  -     traZODone (Desyrel) 150 mg tablet; Take 1 tablet (150 mg) by mouth once daily at bedtime.  -     TSH with reflex to Free T4 if abnormal; Future  Attention deficit hyperactivity disorder (ADHD), unspecified ADHD type  Screening PSA (prostate specific antigen)  -     PSA; Future  Blurry vision  -     Referral to Ophthalmology; Future       "

## 2024-07-18 NOTE — LETTER
July 18, 2024     Aayush Lopez MD  [address]  [facility], [address]  [place] [address]    Patient: Paul Prather   YOB: 1950   Date of Visit: 7/18/2024       Dear Dr. Aayush Lopez MD:    Thank you for referring Paul Prather to me for evaluation. Below are my notes for this consultation.  If you have questions, please do not hesitate to call me. I look forward to following your patient along with you.       Sincerely,     Susan L Mayne, APRN-CNP      CC: No Recipients  ______________________________________________________________________________________    Subjective  Paul Prather is a 74 y.o. male who presents for the following: Skin Check.  Skin Cancer Screening  He has a history of moderate sun exposure. He is in the sun occasionally. He uses sunscreen never. He reports no skin symptoms. His moles are not changing.          Objective  Well appearing patient in no apparent distress; mood and affect are within normal limits.    A focused examination was performed including upper extremities, including the arms, hands, fingers, and fingernails, head, including the scalp, face, neck, nose, ears, eyelids, and lips, and chest, axillae, abdomen, back, and buttocks. All findings within normal limits unless otherwise noted below.    Scattered benign lesions    Stuck on verrucous, tan-brown papules and plaques.      Violaceous/red papule with maroon lagoons     Scattered tan macules in sun-exposed areas.    Left Buccal Cheek  7mm pearly pink papule                Assessment/Plan  Neoplasm of uncertain behavior of skin  Left Buccal Cheek    Lesion biopsy  Type of biopsy: tangential    Informed consent: discussed and consent obtained    Timeout: patient name, date of birth, surgical site, and procedure verified    Procedure prep:  Patient was prepped and draped  Anesthesia: the lesion was anesthetized in a standard fashion    Anesthetic:  1% lidocaine w/ epinephrine 1-100,000 local  infiltration  Instrument used: DermaBlade    Hemostasis achieved with: aluminum chloride    Outcome: patient tolerated procedure well    Post-procedure details: sterile dressing applied and wound care instructions given    Dressing type: petrolatum and bandage      Specimen 1 - Dermatopathology- DERM LAB  Differential Diagnosis: r/o nmsc  Check Margins Yes/No?:    Comments:    Dermpath Lab: Routine Histopathology (formalin-fixed tissue)    -  Discussed differential with patient.   - Given uncertainty of clinical diagnosis, a biopsy is recommended in clinic today.   - The patient expressed understanding, is in agreement with this plan, and wishes to proceed with biopsy.   - Oral and written wound care instructions provided.   - Advised the patient that the office will call within 2 weeks to discuss biopsy results.     Encounter for screening for malignant neoplasm of skin    - Protective measures, such as avoiding skin exposure to sunlight during peak sun hours (10 AM to 3 PM), wearing protective clothing, and applying high-SPF sunscreen, are essential for reducing exposure to harmful ultraviolet (UV) light.  - Monthly self-examination of the skin is helpful to detect new lesions or changes in existing lesions.  - Discussed signs and symptoms of sun-related skin cancers.   - Make sure your moles are not signs of skin cancer (melanoma). Remember the ABCDEs of melanoma lesions:  A - Asymmetry: One half of the lesion does not mirror the other half.  B - Border: The borders are irregular or vague (indistinct).  C - Color: More than one color may be noted within the mole.  D - Diameter: Size greater than 6 mm (roughly the size of a pencil eraser) may be concerning.  E - Evolving: Notable changes in the lesion over time are suspicious signs for skin cancer.    Seborrheic keratosis    Although Seborrheic Keratoses can be troublesome and unsightly, they are entirely benign.  Removal of Seborrheic Keratoses is considered a  cosmetic procedure. Removal is typically performed using liquid nitrogen cryotherapy.  Treatment of current lesions does not prevent the development of new Seborrheic Keratoses in the future.    Hemangioma of skin    - A cherry hemangioma is a small macule (small, flat, smooth area) or papule (small, solid bump) formed from an overgrowth of tiny blood vessels in the skin. Cherry hemangiomas are characteristically red or purplish in color. They often first appear in middle adulthood and usually increase in number with age. Cherry hemangiomas are noncancerous (benign) and are common in adults.  - Lesions are benign, reassured patient.     Lentigo    A solar lentigo (plural, solar lentigines), sometimes called an age spot or liver spot, is a brown macule (small, flat, smooth area of skin) caused by chronic sun or artificial ultraviolet (UV) light exposure. There may be just one lentigo or there may be multiple. This type of lentigo is different from lentigo simplex (discussed separately) because it is caused by exposure to UV light. Solar lentigines are benign, but they do indicate excessive sun exposure, a risk factor for the development of skin cancer.  Lesions are benign, no treatment needed.

## 2024-07-18 NOTE — PROGRESS NOTES
Subjective   Paul Prather is a 74 y.o. male who presents for the following: Skin Check.  Skin Cancer Screening  He has a history of moderate sun exposure. He is in the sun occasionally. He uses sunscreen never. He reports no skin symptoms. His moles are not changing.          Objective   Well appearing patient in no apparent distress; mood and affect are within normal limits.    A focused examination was performed including upper extremities, including the arms, hands, fingers, and fingernails, head, including the scalp, face, neck, nose, ears, eyelids, and lips, and chest, axillae, abdomen, back, and buttocks. All findings within normal limits unless otherwise noted below.    Scattered benign lesions    Stuck on verrucous, tan-brown papules and plaques.      Violaceous/red papule with maroon lagoons     Scattered tan macules in sun-exposed areas.    Left Buccal Cheek  7mm pearly pink papule                Assessment/Plan   Neoplasm of uncertain behavior of skin  Left Buccal Cheek    Lesion biopsy  Type of biopsy: tangential    Informed consent: discussed and consent obtained    Timeout: patient name, date of birth, surgical site, and procedure verified    Procedure prep:  Patient was prepped and draped  Anesthesia: the lesion was anesthetized in a standard fashion    Anesthetic:  1% lidocaine w/ epinephrine 1-100,000 local infiltration  Instrument used: DermaBlade    Hemostasis achieved with: aluminum chloride    Outcome: patient tolerated procedure well    Post-procedure details: sterile dressing applied and wound care instructions given    Dressing type: petrolatum and bandage      Specimen 1 - Dermatopathology- DERM LAB  Differential Diagnosis: r/o nmsc  Check Margins Yes/No?:    Comments:    Dermpath Lab: Routine Histopathology (formalin-fixed tissue)    -  Discussed differential with patient.   - Given uncertainty of clinical diagnosis, a biopsy is recommended in clinic today.   - The patient expressed  understanding, is in agreement with this plan, and wishes to proceed with biopsy.   - Oral and written wound care instructions provided.   - Advised the patient that the office will call within 2 weeks to discuss biopsy results.     Encounter for screening for malignant neoplasm of skin    - Protective measures, such as avoiding skin exposure to sunlight during peak sun hours (10 AM to 3 PM), wearing protective clothing, and applying high-SPF sunscreen, are essential for reducing exposure to harmful ultraviolet (UV) light.  - Monthly self-examination of the skin is helpful to detect new lesions or changes in existing lesions.  - Discussed signs and symptoms of sun-related skin cancers.   - Make sure your moles are not signs of skin cancer (melanoma). Remember the ABCDEs of melanoma lesions:  A - Asymmetry: One half of the lesion does not mirror the other half.  B - Border: The borders are irregular or vague (indistinct).  C - Color: More than one color may be noted within the mole.  D - Diameter: Size greater than 6 mm (roughly the size of a pencil eraser) may be concerning.  E - Evolving: Notable changes in the lesion over time are suspicious signs for skin cancer.    Seborrheic keratosis    Although Seborrheic Keratoses can be troublesome and unsightly, they are entirely benign.  Removal of Seborrheic Keratoses is considered a cosmetic procedure. Removal is typically performed using liquid nitrogen cryotherapy.  Treatment of current lesions does not prevent the development of new Seborrheic Keratoses in the future.    Hemangioma of skin    - A cherry hemangioma is a small macule (small, flat, smooth area) or papule (small, solid bump) formed from an overgrowth of tiny blood vessels in the skin. Cherry hemangiomas are characteristically red or purplish in color. They often first appear in middle adulthood and usually increase in number with age. Cherry hemangiomas are noncancerous (benign) and are common in  adults.  - Lesions are benign, reassured patient.     Lentigo    A solar lentigo (plural, solar lentigines), sometimes called an age spot or liver spot, is a brown macule (small, flat, smooth area of skin) caused by chronic sun or artificial ultraviolet (UV) light exposure. There may be just one lentigo or there may be multiple. This type of lentigo is different from lentigo simplex (discussed separately) because it is caused by exposure to UV light. Solar lentigines are benign, but they do indicate excessive sun exposure, a risk factor for the development of skin cancer.  Lesions are benign, no treatment needed.     Will call within 2 weeks with biopsy results. Please call me if there are any changes or development of concerning symptoms (lesion/skin condition is changing, bleeding, enlarging, or worsening).

## 2024-07-19 ENCOUNTER — LAB (OUTPATIENT)
Dept: LAB | Facility: LAB | Age: 74
End: 2024-07-19
Payer: MEDICARE

## 2024-07-19 DIAGNOSIS — N18.30 STAGE 3 CHRONIC KIDNEY DISEASE, UNSPECIFIED WHETHER STAGE 3A OR 3B CKD (MULTI): ICD-10-CM

## 2024-07-19 PROCEDURE — 36415 COLL VENOUS BLD VENIPUNCTURE: CPT

## 2024-07-19 PROCEDURE — 81001 URINALYSIS AUTO W/SCOPE: CPT

## 2024-07-19 PROCEDURE — 85027 COMPLETE CBC AUTOMATED: CPT

## 2024-07-19 PROCEDURE — 84550 ASSAY OF BLOOD/URIC ACID: CPT

## 2024-07-19 PROCEDURE — 82306 VITAMIN D 25 HYDROXY: CPT

## 2024-07-19 PROCEDURE — 83970 ASSAY OF PARATHORMONE: CPT

## 2024-07-19 PROCEDURE — 82043 UR ALBUMIN QUANTITATIVE: CPT

## 2024-07-19 PROCEDURE — 82570 ASSAY OF URINE CREATININE: CPT

## 2024-07-19 PROCEDURE — 80048 BASIC METABOLIC PNL TOTAL CA: CPT

## 2024-07-20 LAB
25(OH)D3 SERPL-MCNC: 57 NG/ML (ref 30–100)
ANION GAP SERPL CALC-SCNC: 14 MMOL/L (ref 10–20)
APPEARANCE UR: CLEAR
BILIRUB UR STRIP.AUTO-MCNC: NEGATIVE MG/DL
BUN SERPL-MCNC: 30 MG/DL (ref 6–23)
CALCIUM SERPL-MCNC: 9 MG/DL (ref 8.6–10.6)
CHLORIDE SERPL-SCNC: 106 MMOL/L (ref 98–107)
CO2 SERPL-SCNC: 25 MMOL/L (ref 21–32)
COLOR UR: YELLOW
CREAT SERPL-MCNC: 1.48 MG/DL (ref 0.5–1.3)
CREAT UR-MCNC: 131.6 MG/DL (ref 20–370)
EGFRCR SERPLBLD CKD-EPI 2021: 49 ML/MIN/1.73M*2
ERYTHROCYTE [DISTWIDTH] IN BLOOD BY AUTOMATED COUNT: 13.9 % (ref 11.5–14.5)
GLUCOSE SERPL-MCNC: 141 MG/DL (ref 74–99)
GLUCOSE UR STRIP.AUTO-MCNC: NORMAL MG/DL
HCT VFR BLD AUTO: 44.3 % (ref 41–52)
HGB BLD-MCNC: 14.6 G/DL (ref 13.5–17.5)
HOLD SPECIMEN: NORMAL
KETONES UR STRIP.AUTO-MCNC: ABNORMAL MG/DL
LEUKOCYTE ESTERASE UR QL STRIP.AUTO: NEGATIVE
MCH RBC QN AUTO: 32.5 PG (ref 26–34)
MCHC RBC AUTO-ENTMCNC: 33 G/DL (ref 32–36)
MCV RBC AUTO: 99 FL (ref 80–100)
MICROALBUMIN UR-MCNC: 46.8 MG/L
MICROALBUMIN/CREAT UR: 35.6 UG/MG CREAT
MUCOUS THREADS #/AREA URNS AUTO: ABNORMAL /LPF
NITRITE UR QL STRIP.AUTO: NEGATIVE
NRBC BLD-RTO: 0 /100 WBCS (ref 0–0)
PH UR STRIP.AUTO: 5.5 [PH]
PLATELET # BLD AUTO: 204 X10*3/UL (ref 150–450)
POTASSIUM SERPL-SCNC: 4.1 MMOL/L (ref 3.5–5.3)
PROT UR STRIP.AUTO-MCNC: ABNORMAL MG/DL
RBC # BLD AUTO: 4.49 X10*6/UL (ref 4.5–5.9)
RBC # UR STRIP.AUTO: ABNORMAL /UL
RBC #/AREA URNS AUTO: ABNORMAL /HPF
SODIUM SERPL-SCNC: 141 MMOL/L (ref 136–145)
SP GR UR STRIP.AUTO: 1.02
URATE SERPL-MCNC: 5.4 MG/DL (ref 4–7.5)
UROBILINOGEN UR STRIP.AUTO-MCNC: NORMAL MG/DL
WBC # BLD AUTO: 7.3 X10*3/UL (ref 4.4–11.3)
WBC #/AREA URNS AUTO: ABNORMAL /HPF

## 2024-07-21 ENCOUNTER — HOSPITAL ENCOUNTER (OUTPATIENT)
Dept: RADIOLOGY | Facility: CLINIC | Age: 74
Discharge: HOME | End: 2024-07-21
Payer: MEDICARE

## 2024-07-21 DIAGNOSIS — I47.10 SVT (SUPRAVENTRICULAR TACHYCARDIA) (CMS-HCC): ICD-10-CM

## 2024-07-21 PROCEDURE — 75571 CT HRT W/O DYE W/CA TEST: CPT

## 2024-07-22 LAB
LABORATORY COMMENT REPORT: NORMAL
PATH REPORT.FINAL DX SPEC: NORMAL
PATH REPORT.GROSS SPEC: NORMAL
PATH REPORT.MICROSCOPIC SPEC OTHER STN: NORMAL
PATH REPORT.RELEVANT HX SPEC: NORMAL
PATH REPORT.TOTAL CANCER: NORMAL
PTH-INTACT SERPL-MCNC: 62.3 PG/ML (ref 18.5–88)

## 2024-07-23 ENCOUNTER — TELEPHONE (OUTPATIENT)
Dept: PRIMARY CARE | Facility: CLINIC | Age: 74
End: 2024-07-23
Payer: MEDICARE

## 2024-07-23 DIAGNOSIS — I47.10 SVT (SUPRAVENTRICULAR TACHYCARDIA) (CMS-HCC): ICD-10-CM

## 2024-07-23 DIAGNOSIS — I25.10 ASHD (ARTERIOSCLEROTIC HEART DISEASE): Primary | ICD-10-CM

## 2024-07-23 NOTE — TELEPHONE ENCOUNTER
Pt was made aware.      ----- Message from Aayush Lopez sent at 7/23/2024 11:05 AM EDT -----  Referral to cardio ramicone in  Fax coronary calcium score, last EKG, any other cardiac testing and last labs

## 2024-07-24 ENCOUNTER — TELEPHONE (OUTPATIENT)
Dept: DERMATOLOGY | Facility: CLINIC | Age: 74
End: 2024-07-24
Payer: MEDICARE

## 2024-07-24 DIAGNOSIS — C44.319 BASAL CELL CARCINOMA (BCC) OF SKIN OF OTHER PART OF FACE: ICD-10-CM

## 2024-07-24 DIAGNOSIS — R93.1 ELEVATED CORONARY ARTERY CALCIUM SCORE: Primary | Chronic | ICD-10-CM

## 2024-07-24 PROBLEM — I25.10 CORONARY ARTERY DISEASE INVOLVING NATIVE CORONARY ARTERY OF NATIVE HEART WITHOUT ANGINA PECTORIS: Chronic | Status: ACTIVE | Noted: 2024-07-24

## 2024-07-24 NOTE — TELEPHONE ENCOUNTER
Patient contacted via telephone call to discuss BX results, did not answer. VM left detailing nature of call and instructions to return office call at [phone].

## 2024-07-30 NOTE — TELEPHONE ENCOUNTER
Approving, but needs appt for additional refills.   
Approving, but needs appt for additional refills.   
30-Jul-2024 12:12

## 2024-07-31 ASSESSMENT — ENCOUNTER SYMPTOMS
PSYCHIATRIC NEGATIVE: 1
RESPIRATORY NEGATIVE: 1
NEUROLOGICAL NEGATIVE: 1
CARDIOVASCULAR NEGATIVE: 1
ENDOCRINE NEGATIVE: 1
HEMATOLOGIC/LYMPHATIC NEGATIVE: 1
BACK PAIN: 1
CONSTITUTIONAL NEGATIVE: 1
GASTROINTESTINAL NEGATIVE: 1

## 2024-07-31 NOTE — PROGRESS NOTES
History Of Present Illness  Paul Prather is a 74 y.o. male with medical history significant for CKD3, HTN, ADHD, chronic back pain, and anxiety who presents for a 6-month fuv for CKD.     Past Medical History  As above.    Surgical History  He has no past surgical history on file.     Social History  He reports that he has quit smoking. His smoking use included cigarettes. He has never used smokeless tobacco. He reports current alcohol use. He reports that he does not use drugs.    Family History  Family History   Problem Relation Name Age of Onset    Other (arteriosclerotic vascular disease) Father          Allergies  Other and Sulfa (sulfonamide antibiotics)    Review of Systems   Constitutional: Negative.    HENT: Negative.     Respiratory: Negative.     Cardiovascular: Negative.    Gastrointestinal: Negative.    Endocrine: Negative.    Genitourinary: Negative.    Musculoskeletal:  Positive for back pain.   Skin: Negative.    Neurological: Negative.    Hematological: Negative.    Psychiatric/Behavioral: Negative.          Physical Exam  Constitutional:       Appearance: Normal appearance.   HENT:      Head: Normocephalic and atraumatic.      Mouth/Throat:      Mouth: Mucous membranes are moist.   Cardiovascular:      Rate and Rhythm: Normal rate and regular rhythm.      Pulses: Normal pulses.      Heart sounds: Normal heart sounds.   Pulmonary:      Effort: Pulmonary effort is normal.      Breath sounds: Normal breath sounds.   Musculoskeletal:         General: Normal range of motion.   Skin:     General: Skin is warm and dry.   Neurological:      General: No focal deficit present.      Mental Status: He is alert and oriented to person, place, and time.   Psychiatric:         Mood and Affect: Mood normal.         Behavior: Behavior normal.         Thought Content: Thought content normal.         Judgment: Judgment normal.          Last Recorded Vitals  Blood pressure 121/63, pulse 54, temperature 36.3 °C (97.4  "°F), temperature source Temporal, height 1.778 m (5' 10\"), weight 83.4 kg (183 lb 12.8 oz), SpO2 93%.    Relevant Results  Recent Results (from the past 2016 hour(s))   Dermatopathology- DERM LAB    Collection Time: 07/18/24  1:51 PM   Result Value Ref Range    Case Report       Dermatopathology                                  Case: [id number]                                   Authorizing Provider:  Susan L Mayne, APRN-CNP    Collected:           07/18/2024 1351              Ordering Location:     [facility]       Received:            07/18/2024 1540              Pathologist:           Ivon Ford MD                                                             Specimen:    SKIN, Left Buccal Cheek                                                                    FINAL DIAGNOSIS       SKIN, LEFT BUCCAL CHEEK, SHAVE BIOPSY:  BASAL CELL CARCINOMA, NODULAR SUBTYPE, EXTENDING TO THE DEEP MARGIN IN THESE PLANES OF SECTION.     **Electronically signed out by IVON FORD MD**                By the signature on this report, the individual or group listed as making the Final Interpretation/Diagnosis certifies that they have reviewed this case.       Clinical History       Encounter Diagnosis: Neoplasm of uncertain behavior of skin       [id number]  Collection Comments: Differential Diagnosis: r/o nmsc  Check Margins Yes/No?:    Comments:    Dermpath Lab: Routine Histopathology (formalin-fixed tissue)  Finding Region: Left Buccal Cheek  Specimen Objective: 7mm pearly pink papule      Microscopic Description       Microscopic examination performed.        Gross Description       A:  Received in formalin is a 11 x 8 x 1 mm piece of skin.  It is tan in color.  It is shave in shape.  It was embedded in toto.  The specimen was inked.      The specimen was grossed by Windy Ng.      Albumin-Creatinine Ratio, Urine Random    Collection Time: 07/19/24 12:47 PM   Result Value Ref Range    Albumin, Urine Random " 46.8 Not established mg/L    Creatinine, Urine Random 131.6 20.0 - 370.0 mg/dL    Albumin/Creatinine Ratio 35.6 (H) <30.0 ug/mg Creat   Basic Metabolic Panel    Collection Time: 07/19/24 12:47 PM   Result Value Ref Range    Glucose 141 (H) 74 - 99 mg/dL    Sodium 141 136 - 145 mmol/L    Potassium 4.1 3.5 - 5.3 mmol/L    Chloride 106 98 - 107 mmol/L    Bicarbonate 25 21 - 32 mmol/L    Anion Gap 14 10 - 20 mmol/L    Urea Nitrogen 30 (H) 6 - 23 mg/dL    Creatinine 1.48 (H) 0.50 - 1.30 mg/dL    eGFR 49 (L) >60 mL/min/1.73m*2    Calcium 9.0 8.6 - 10.6 mg/dL   CBC    Collection Time: 07/19/24 12:47 PM   Result Value Ref Range    WBC 7.3 4.4 - 11.3 x10*3/uL    nRBC 0.0 0.0 - 0.0 /100 WBCs    RBC 4.49 (L) 4.50 - 5.90 x10*6/uL    Hemoglobin 14.6 13.5 - 17.5 g/dL    Hematocrit 44.3 41.0 - 52.0 %    MCV 99 80 - 100 fL    MCH 32.5 26.0 - 34.0 pg    MCHC 33.0 32.0 - 36.0 g/dL    RDW 13.9 11.5 - 14.5 %    Platelets 204 150 - 450 x10*3/uL   Vitamin D 25-Hydroxy,Total (for eval of Vitamin D levels)    Collection Time: 07/19/24 12:47 PM   Result Value Ref Range    Vitamin D, 25-Hydroxy, Total 57 30 - 100 ng/mL   Parathyroid Hormone, Intact    Collection Time: 07/19/24 12:47 PM   Result Value Ref Range    Parathyroid Hormone, Intact 62.3 18.5 - 88.0 pg/mL   Uric acid    Collection Time: 07/19/24 12:47 PM   Result Value Ref Range    Uric Acid 5.4 4.0 - 7.5 mg/dL   Urinalysis with Reflex Culture and Microscopic    Collection Time: 07/19/24 12:47 PM   Result Value Ref Range    Color, Urine Yellow Light-Yellow, Yellow, Dark-Yellow    Appearance, Urine Clear Clear    Specific Gravity, Urine 1.021 1.005 - 1.035    pH, Urine 5.5 5.0, 5.5, 6.0, 6.5, 7.0, 7.5, 8.0    Protein, Urine 10 (TRACE) NEGATIVE, 10 (TRACE), 20 (TRACE) mg/dL    Glucose, Urine Normal Normal mg/dL    Blood, Urine 0.06 (1+) (A) NEGATIVE    Ketones, Urine 10 (1+) (A) NEGATIVE mg/dL    Bilirubin, Urine NEGATIVE NEGATIVE    Urobilinogen, Urine Normal Normal mg/dL    Nitrite,  "Urine NEGATIVE NEGATIVE    Leukocyte Esterase, Urine NEGATIVE NEGATIVE   Extra Urine Gray Tube    Collection Time: 24 12:47 PM   Result Value Ref Range    Extra Tube Hold for add-ons.    Urinalysis Microscopic    Collection Time: 24 12:47 PM   Result Value Ref Range    WBC, Urine NONE 1-5, NONE /HPF    RBC, Urine 6-10 (A) NONE, 1-2, 3-5 /HPF    Mucus, Urine 1+ Reference range not established. /LPF         Assessment/Plan   74 y.o. male with medical history significant for CKD3, HTN, ADHD, chronic back pain, and anxiety who presents for a 6-month fuv for CKD.     # CKD3: baseline sCr 1.6 - 2.0 mg/dL with eGFR 35 - 47 ml/min/1.73m2 since at least . 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 Per note from the [facility], \"renal biopsy showed 10-15% tubular atrophy/interstitial fibrosis with some glomerulosclerosis\". Renal US done on 22 showed left renal nonobstructive calculus and small cyst otherwise unremarkable. Most recent sCr 1.67 mg/dL (10/27/23) - at baseline. Currently stable.    # HTN: well controlled with current regimen.    # Long-term NSAIDs use: has stopped ~ 9 months ago.    Plan:  - Labs.  - Continue to follow up with PCP for optimal HTN management.  - No changes with current medications.  - Reviewed strategies for preserving remaining kidney function includin) Avoidance of NSAIDs including Aleve (Naprosyn), Motrin (Ibuprofen), Mobic (Meloxicam), Celebrex (Celecoxib) and aspirin as well as PPI acid blocking medications such as Prilosec (omeprazole), Protonix (pantoprazole), and Nexium (esomeprazole), as well as other nephrotoxic agents.   2) Avoidance of tobacco or alcohol use.   3) Adequate hydration daily.   4) Blood pressure target 130/80 mmHg.   5) Daily dietary sodium intake less than 2 grams per day.    6) Maintain healthy lifestyle. Healthy diet and regular exercises.   7) Maintain ideal weight.  - FUV: " in 6 months or sooner if concerns arise.     Patient verbalized understanding of above. He will not hesitate to contact Division of Nephrology at [phone] with concerns/questions.    I spent 20 minutes in the professional and overall care of this patient.      Dilcia Barnard, APRN-CNP

## 2024-08-02 ENCOUNTER — HOSPITAL ENCOUNTER (OUTPATIENT)
Dept: CARDIOLOGY | Facility: HOSPITAL | Age: 74
Discharge: HOME | End: 2024-08-02
Payer: MEDICARE

## 2024-08-02 DIAGNOSIS — R93.1 ELEVATED CORONARY ARTERY CALCIUM SCORE: Chronic | ICD-10-CM

## 2024-08-02 PROCEDURE — 93018 CV STRESS TEST I&R ONLY: CPT

## 2024-08-02 PROCEDURE — 93016 CV STRESS TEST SUPVJ ONLY: CPT

## 2024-08-02 PROCEDURE — 93017 CV STRESS TEST TRACING ONLY: CPT

## 2024-08-07 ENCOUNTER — OFFICE VISIT (OUTPATIENT)
Dept: CARDIOLOGY | Facility: CLINIC | Age: 74
End: 2024-08-07
Payer: MEDICARE

## 2024-08-07 VITALS
DIASTOLIC BLOOD PRESSURE: 63 MMHG | BODY MASS INDEX: 25.91 KG/M2 | WEIGHT: 181 LBS | SYSTOLIC BLOOD PRESSURE: 110 MMHG | OXYGEN SATURATION: 98 % | HEART RATE: 52 BPM | HEIGHT: 70 IN

## 2024-08-07 DIAGNOSIS — R94.39 ABNORMAL STRESS ELECTROCARDIOGRAM TEST: Chronic | ICD-10-CM

## 2024-08-07 DIAGNOSIS — I47.10 SVT (SUPRAVENTRICULAR TACHYCARDIA) (CMS-HCC): ICD-10-CM

## 2024-08-07 DIAGNOSIS — I47.10 SVT (SUPRAVENTRICULAR TACHYCARDIA) (CMS-HCC): Primary | ICD-10-CM

## 2024-08-07 PROCEDURE — 1036F TOBACCO NON-USER: CPT | Performed by: INTERNAL MEDICINE

## 2024-08-07 PROCEDURE — 3078F DIAST BP <80 MM HG: CPT | Performed by: INTERNAL MEDICINE

## 2024-08-07 PROCEDURE — 99214 OFFICE O/P EST MOD 30 MIN: CPT | Performed by: INTERNAL MEDICINE

## 2024-08-07 PROCEDURE — 1159F MED LIST DOCD IN RCRD: CPT | Performed by: INTERNAL MEDICINE

## 2024-08-07 PROCEDURE — 1126F AMNT PAIN NOTED NONE PRSNT: CPT | Performed by: INTERNAL MEDICINE

## 2024-08-07 PROCEDURE — 3074F SYST BP LT 130 MM HG: CPT | Performed by: INTERNAL MEDICINE

## 2024-08-07 RX ORDER — ATORVASTATIN CALCIUM 40 MG/1
40 TABLET, FILM COATED ORAL DAILY
Qty: 30 TABLET | Refills: 11 | Status: SHIPPED | OUTPATIENT
Start: 2024-08-07 | End: 2024-08-07

## 2024-08-07 RX ORDER — ATORVASTATIN CALCIUM 40 MG/1
40 TABLET, FILM COATED ORAL DAILY
Qty: 90 TABLET | Refills: 3 | Status: SHIPPED | OUTPATIENT
Start: 2024-08-07

## 2024-08-07 ASSESSMENT — PAIN SCALES - GENERAL: PAINLEVEL: 0-NO PAIN

## 2024-08-07 NOTE — PROGRESS NOTES
"History Of Present Illness:      This is a 74-year-old male with episodes of atrial tachycardia.  He presents for a follow-up after having a exercise treadmill stress test.  He has fatigue on exertion and has to sit down periodically because of leg weakness.  He recently had a coronary artery calcium score which was markedly elevated.  The patient states that he feels much better when he is taking Adderall for treatment of ADHD compared to when he has had to go without the medication.    Review of Systems  Other review of systems negative     Last Recorded Vitals:      11/2/2023    10:22 AM 12/1/2023    10:19 AM 3/25/2024    10:03 AM 4/15/2024     4:59 PM 7/12/2024    10:06 AM 7/18/2024     4:30 PM 8/7/2024     9:27 AM   Vitals   Systolic  113 124 122 121 122 110   Diastolic  67 70 76 63 72 63   Heart Rate  60 74 61 54 63 52   Temp  36.6 °C (97.8 °F)  36.5 °C (97.7 °F) 36.3 °C (97.4 °F)     Resp  18        Height (in) 1.778 m (5' 10\") 1.778 m (5' 10\") 1.778 m (5' 10\") 1.778 m (5' 10\") 1.778 m (5' 10\") 1.778 m (5' 10\") 1.778 m (5' 10\")   Weight (lb) 192 194.2 196 195 183.8 182 181   BMI 27.55 kg/m2 27.86 kg/m2 28.12 kg/m2 27.98 kg/m2 26.37 kg/m2 26.11 kg/m2 25.97 kg/m2   BSA (m2) 2.07 m2 2.09 m2 2.1 m2 2.09 m2 2.03 m2 2.02 m2 2.01 m2   Visit Report Report Report Report Report Report Report    Report Report     Allergies:  Other and Sulfa (sulfonamide antibiotics)    Outpatient Medications:  Current Outpatient Medications   Medication Instructions    amphetamine-dextroamphetamine XR (Adderall XR) 20 mg 24 hr capsule 20 mg, oral, Every morning, Do not crush or chew.    atorvastatin (LIPITOR) 40 mg, oral, Daily    clonazePAM (KLONOPIN) 0.5 mg, oral, Every 12 hours PRN    fluticasone (Flonase) 50 mcg/actuation nasal spray 1 spray, Each Nostril, Daily, Shake gently. Before first use, prime pump. After use, clean tip and replace cap.    latanoprost (Xalatan) 0.005 % ophthalmic solution 1 drop, Both Eyes, Daily    Linzess " 145 mcg, oral, Daily before breakfast    lisinopril 30 mg, oral, Daily    metoprolol succinate XL (TOPROL-XL) 25 mg, oral, Daily    multivit-minerals/folic acid (CENTRUM ADULTS ORAL) No dose, route, or frequency recorded.    traZODone (DESYREL) 150 mg, oral, Nightly    venlafaxine XR (EFFEXOR-XR) 150 mg, oral, Daily     Physical Exam:    General Appearance:  Alert, oriented, no distress  Skin:  Warm and dry  Head and Neck:  No elevation of JVP, no carotid bruits  Cardiac Exam:  Rhythm is regular, S1 and S2 are normal, no murmur S3 or S4  Lungs:  Clear to auscultation  Extremities:  no edema  Neurologic:  No focal deficits  Psychiatric:  Appropriate mood and behavior    Cardiology Tests:  I have personally review the diagnostic cardiac testing and my interpretation is as follows:    EKG November 9, 2022: Sinus tachycardia  Echocardiogram December 2022: Normal left ventricular function with mild aortic valve regurgitation  Holter monitor January 15, 2024: Sinus rhythm with brief runs of atrial tachycardia up to 9 beats in duration  Echocardiogram January 2024: Normal left ventricular function, mild aortic valve regurgitation  Coronary artery calcium score: 4049  Exercise treadmill stress test: No ischemic changes by EKG.  The patient became hypotensive during the recovery phase.    Assessment/Plan   Problem List Items Addressed This Visit             ICD-10-CM    SVT (supraventricular tachycardia) (CMS-HCC) - Primary (Chronic) I47.10     No symptomatic recurrences of atrial tachycardia.  Continue metoprolol.         Relevant Medications    atorvastatin (Lipitor) 40 mg tablet    Abnormal stress electrocardiogram test (Chronic) R94.39     Coronary artery disease: The patient has a markedly elevated calcium score.  He is being placed on Lipitor 40 mg daily, and aspirin 81 mg daily is recommended.  The patient became hypotensive during the recovery phase of the stress test.  I am concerned about the development of  hypotension even though the ECG did not show definitive ischemia.  I feel that a cardiac catheterization should be considered to more accurately define the coronary anatomy based on the stress test findings.  The patient is in agreement with the recommendation and this will be scheduled at [facility] in the near future.            James C Ramicone, DO

## 2024-08-07 NOTE — ASSESSMENT & PLAN NOTE
Coronary artery disease: The patient has a markedly elevated calcium score.  He is being placed on Lipitor 40 mg daily, and aspirin 81 mg daily is recommended.  The patient became hypotensive during the recovery phase of the stress test.  I am concerned about the development of hypotension even though the ECG did not show definitive ischemia.  I feel that a cardiac catheterization should be considered to more accurately define the coronary anatomy based on the stress test findings.  The patient is in agreement with the recommendation and this will be scheduled at [facility] in the near future.

## 2024-08-07 NOTE — PATIENT INSTRUCTIONS
Begin Aspirin 81 mg daily.    Begin Lipitor 40 mg daily.    A cardiac catheterization will be scheduled at [facility].

## 2024-08-13 DIAGNOSIS — R93.1 ELEVATED CORONARY ARTERY CALCIUM SCORE: ICD-10-CM

## 2024-08-13 DIAGNOSIS — I47.10 SVT (SUPRAVENTRICULAR TACHYCARDIA) (CMS-HCC): ICD-10-CM

## 2024-08-13 DIAGNOSIS — R94.39 ABNORMAL STRESS ELECTROCARDIOGRAM TEST: ICD-10-CM

## 2024-08-14 ENCOUNTER — APPOINTMENT (OUTPATIENT)
Dept: DERMATOLOGY | Facility: CLINIC | Age: 74
End: 2024-08-14
Payer: MEDICARE

## 2024-08-14 DIAGNOSIS — C44.319 BASAL CELL CARCINOMA (BCC) OF SKIN OF OTHER PART OF FACE: ICD-10-CM

## 2024-08-14 PROCEDURE — 17311 MOHS 1 STAGE H/N/HF/G: CPT | Performed by: DERMATOLOGY

## 2024-08-14 PROCEDURE — 99214 OFFICE O/P EST MOD 30 MIN: CPT | Performed by: DERMATOLOGY

## 2024-08-14 PROCEDURE — 13132 CMPLX RPR F/C/C/M/N/AX/G/H/F: CPT | Performed by: DERMATOLOGY

## 2024-08-14 NOTE — PROGRESS NOTES
Mohs Surgery Operative Note    Date of Surgery:  8/14/2024  Surgeon:  Chelsea Ybarra MD  Office Location:  [address] [facility]  [address]  [address]  [facility] [address]  Dept: [phone]  Dept Fax: [phone]  Referring Provider: Susan L Mayne, APRN-CNP  [address]  [place] [address]      Assessment/Plan   Pre-procedure:   Obtained informed consent: written from patient  The surgical site was identified and confirmed with the patient.     Intra-operative:   Audible time out called at : 10:35 AM 08/14/24  by: Morelia Blankenship LPN   Verified patient name, birthdate, site, specimen bottle label & requisition.    The planned procedure(s) was again reviewed with the patient. The risks of bleeding, infection, nerve damage and scarring were reviewed. Written authorization was obtained. The patient identity, surgical site, and planned procedure(s) were verified. The provider acted as both surgeon and pathologist.     Basal cell carcinoma (BCC) of skin of other part of face  Left Buccal Cheek  Mohs surgery  Consent obtained: written    Universal Protocol:  Procedure explained and questions answered to patient or proxy's satisfaction: Yes    Test results available and properly labeled: Yes    Pathology report reviewed: Yes    External notes reviewed: Yes    Photo or diagram used for site identification: Yes    Site/side marked: Yes    Slide independently reviewed by Mohs surgeon: Yes    Immediately prior to procedure a time out was called: Yes    Patient identity confirmed: verbally with patient  Preparation: Patient was prepped and draped in usual sterile fashion      Anticoagulation:  Is the patient taking prescription anticoagulant and/or aspirin prescribed/recommended by a physician? Yes    Was the anticoagulation regimen changed prior to Mohs? No      Anesthesia:  Anesthesia method: local infiltration  Local anesthetic: lidocaine 2% WITH epi    Procedure Details:  Case  ID Number: [id number]  Biopsy accession number: [id number]  Date of biopsy: 7/18/2024  Pre-Op diagnosis: basal cell carcinoma  BCC subtype: nodular  Surgery side: left  Surgical site (from skin exam): Left Buccal Cheek  Pre-operative length (cm): 1.1  Pre-operative width (cm): 0.9  Indications for Mohs surgery: anatomic location where tissue conservation is critical and ill-defined borders  Previously treated? No      Micrographic Surgery Details:  Post-operative length (cm): 1.5  Post-operative width (cm): 1.5  Number of Mohs stages: 1    Stage 1     Comments: The patient was brought into the operating room and placed in the procedure chair in the appropriate position.  The area positive by previous biopsy was identified and confirmed with the patient. The area of clinically obvious tumor was debulked using a curette and/or scalpel as needed. An incision was made following the Mohs approach through the skin. The specimen was taken to the lab, divided into 2 piece(s) and appropriately chromacoded and processed.  Tumor features identified on Mohs section: no tumor identified  Depth of defect: subcutaneous fat    Patient tolerance of procedure: tolerated well, no immediate complications    Reconstruction:  Was the defect reconstructed? Yes    Was reconstruction performed by the same Mohs surgeon? Yes    Setting of reconstruction: outpatient office  When was reconstruction performed? same day  Type of reconstruction: linear  Linear reconstruction: complex  Length of linear repair (cm): 3.5  Subcutaneous Layers (Deep Stitches)   Suture size:  5-0  Suture type:  Vicryl  Stitches:  Buried vertical mattress  Fine/surface layer approximation (top stitches)   Epidermal/Superficial suture size:  5-0  Epidermal/Superficial suture type:  Fast-absorbing gut  Stitches: simple running    Hemostasis achieved with: electrodesiccation  Outcome: patient tolerated procedure well with no complications    Post-procedure details: sterile  dressing applied and wound care instructions given    Dressing type: pressure dressing      Complex Linear Repair - Wide Undermining:  Given the location and size of the defect, it was determined that a complex layered linear closure was required to restore normal anatomy and function. The repair was considered complex because extensive undermining was required and performed. The amount of undermining performed was greater than the maximum width of the defect as measured perpendicular to the closure line along at least one entire edge of the defect. Standing cutaneous cones were removed using Burow's triangles. The wound edges were brought into close approximation with buried vertical mattress sutures. The remainder of the wound was then closed with epidermal top sutures.    The final repair measured 3.5 cm                  Wound care was discussed, and the patient was given written post-operative wound care instructions.      The patient will follow up with Chelsea Ybarra MD as needed for any post operative problems or concerns, and will follow up with their primary dermatologist as scheduled.

## 2024-08-14 NOTE — PROGRESS NOTES
Office Visit Note  Date: 8/14/2024  Surgeon:  Chelsea Ybarra MD  Office Location:  [address] [facility]  [address] [facility]  [address]  [facility] [address]  Dept: [phone]  Dept Fax: [phone]  Referring Provider: Susan L Mayne, APRN-CNP  [address] [facility]  [address]  [place] [address]    [facility]   Paul Prather is a 74 y.o. male who presents for the following: MOHS Surgery (Left Buccal Cheek - BCC) and limited skin exam of the face.    According to the patient, the lesion has been present for approximately 1 month at the time of diagnosis.  The lesion is not causing symptoms.  The lesion has not been treated previously.    The patient does not have a pacemaker / defibrillator.  The patient does not have a heart valve / joint replacement.    The patient is on blood thinners.  The patient does not have a history of hepatitis B or C.  The patient does not have a history of HIV.  The patient does not have a history of immunosuppression (e.g. organ transplantation, malignancy, medications)    Review of Systems:  No other skin or systemic complaints other than what is documented elsewhere in the note.    MEDICAL HISTORY: clinically relevant history including significant past medical history, medications and allergies was reviewed and documented in Epic.    Objective   Well appearing patient in no apparent distress; mood and affect are within normal limits.  Vital signs: See record.  -Noted on the Left Buccal Cheek Is a 1.1 x 0.9 cm scar c/w recent biopsy.  The patient confirmed the identified site.  -Right forehead > left forehead with scattered stunk on keratoses  -Face with few scattered red papules    Discussion:    Basal cell carcinoma left buccal cheek:   The nature of the diagnosis was explained. The lesion is a skin cancer.  It has a risk of local growth and distant spread. The condition is associated with sun exposure.  Warning signs of non-melanoma skin cancer discussed. Patient was  instructed to perform monthly self skin examination.  We recommended that the patient have regular full skin exams given an increased risk of subsequent skin cancers. The patient was instructed to use sun protective behaviors including use of broad spectrum sunscreens and sun protective clothing to reduce risk of skin cancers.      Risks, benefits, side effects of Mohs surgery were discussed with patient and the patient voiced understanding.  It was explained that even though the cure rate of Mohs is very high it is not 100%. Risks of surgery including but not limited to bleeding, infection, numbness, nerve damage, and scar were reviewed.  Discussion included wound care requirements, activity restrictions, likely scar outcome and time to heal.     After Mohs surgery, the defect may need to be repaired surgically and the scar may be longer than the original lesion.  Reconstruction options, risks, and benefits were reviewed including second intention healing, linear repair (4-1 ratio was explained), local flaps, skin grafts, cartilage grafts and interpolation flaps (the need for multiple surgeries was explained). Possible outcomes were reviewed including likely scar appearance, failure of flap survival, infection, bleeding and the need for revision surgery.     The pathology was reviewed, the photograph was reviewed, and the referring physician's note was reviewed.    Patient elected for Mohs surgery.    2. Seborrheic keratoses, cherry angiomas- reassured of benign nature. Emphasized the importance of sun protection with sun screen spf > 30. Handout provided. Continue full body skin checks with Sue Mayne every 6-12 months.

## 2024-08-19 ENCOUNTER — LAB (OUTPATIENT)
Dept: LAB | Facility: LAB | Age: 74
End: 2024-08-19
Payer: MEDICARE

## 2024-08-19 DIAGNOSIS — R93.1 ELEVATED CORONARY ARTERY CALCIUM SCORE: ICD-10-CM

## 2024-08-19 DIAGNOSIS — I47.10 SVT (SUPRAVENTRICULAR TACHYCARDIA) (CMS-HCC): ICD-10-CM

## 2024-08-19 DIAGNOSIS — Z12.5 SCREENING PSA (PROSTATE SPECIFIC ANTIGEN): ICD-10-CM

## 2024-08-19 DIAGNOSIS — R94.39 ABNORMAL STRESS ELECTROCARDIOGRAM TEST: ICD-10-CM

## 2024-08-19 LAB
ANION GAP SERPL CALC-SCNC: 13 MMOL/L (ref 10–20)
BUN SERPL-MCNC: 22 MG/DL (ref 6–23)
CALCIUM SERPL-MCNC: 9.2 MG/DL (ref 8.6–10.6)
CHLORIDE SERPL-SCNC: 104 MMOL/L (ref 98–107)
CO2 SERPL-SCNC: 27 MMOL/L (ref 21–32)
CREAT SERPL-MCNC: 1.56 MG/DL (ref 0.5–1.3)
EGFRCR SERPLBLD CKD-EPI 2021: 46 ML/MIN/1.73M*2
ERYTHROCYTE [DISTWIDTH] IN BLOOD BY AUTOMATED COUNT: 13.1 % (ref 11.5–14.5)
GLUCOSE SERPL-MCNC: 132 MG/DL (ref 74–99)
HCT VFR BLD AUTO: 43.9 % (ref 41–52)
HGB BLD-MCNC: 14.5 G/DL (ref 13.5–17.5)
INR PPP: 1 (ref 0.9–1.1)
MCH RBC QN AUTO: 32.3 PG (ref 26–34)
MCHC RBC AUTO-ENTMCNC: 33 G/DL (ref 32–36)
MCV RBC AUTO: 98 FL (ref 80–100)
NRBC BLD-RTO: 0 /100 WBCS (ref 0–0)
PLATELET # BLD AUTO: 204 X10*3/UL (ref 150–450)
POTASSIUM SERPL-SCNC: 4.2 MMOL/L (ref 3.5–5.3)
PROTHROMBIN TIME: 11.4 SECONDS (ref 9.8–12.8)
PSA SERPL-MCNC: 0.59 NG/ML
RBC # BLD AUTO: 4.49 X10*6/UL (ref 4.5–5.9)
SODIUM SERPL-SCNC: 140 MMOL/L (ref 136–145)
WBC # BLD AUTO: 8.9 X10*3/UL (ref 4.4–11.3)

## 2024-08-19 PROCEDURE — G0103 PSA SCREENING: HCPCS

## 2024-08-19 PROCEDURE — 85610 PROTHROMBIN TIME: CPT

## 2024-08-19 PROCEDURE — 80048 BASIC METABOLIC PNL TOTAL CA: CPT

## 2024-08-19 PROCEDURE — 36415 COLL VENOUS BLD VENIPUNCTURE: CPT

## 2024-08-19 PROCEDURE — 85027 COMPLETE CBC AUTOMATED: CPT

## 2024-08-21 ENCOUNTER — APPOINTMENT (OUTPATIENT)
Dept: ORTHOPEDIC SURGERY | Facility: CLINIC | Age: 74
End: 2024-08-21
Payer: MEDICARE

## 2024-08-21 DIAGNOSIS — M25.562 CHRONIC PAIN OF BOTH KNEES: ICD-10-CM

## 2024-08-21 DIAGNOSIS — G89.29 CHRONIC PAIN OF BOTH KNEES: ICD-10-CM

## 2024-08-21 DIAGNOSIS — M17.0 ARTHRITIS OF BOTH KNEES: Primary | ICD-10-CM

## 2024-08-21 DIAGNOSIS — M25.561 CHRONIC PAIN OF BOTH KNEES: ICD-10-CM

## 2024-08-21 PROCEDURE — 99213 OFFICE O/P EST LOW 20 MIN: CPT | Performed by: ORTHOPAEDIC SURGERY

## 2024-08-21 PROCEDURE — 20610 DRAIN/INJ JOINT/BURSA W/O US: CPT | Performed by: ORTHOPAEDIC SURGERY

## 2024-08-21 RX ORDER — LIDOCAINE HYDROCHLORIDE 10 MG/ML
2 INJECTION INFILTRATION; PERINEURAL
Status: COMPLETED | OUTPATIENT
Start: 2024-08-21 | End: 2024-08-21

## 2024-08-21 RX ORDER — TRIAMCINOLONE ACETONIDE 40 MG/ML
40 INJECTION, SUSPENSION INTRA-ARTICULAR; INTRAMUSCULAR
Status: COMPLETED | OUTPATIENT
Start: 2024-08-21 | End: 2024-08-21

## 2024-08-21 ASSESSMENT — ENCOUNTER SYMPTOMS
KNEE DEFORMITY: 1
KNEE SWELLING: 1

## 2024-08-21 NOTE — PROGRESS NOTES
Subjective    Patient ID: Paul Prather is a 74 y.o. male.    Chief Complaint: Follow-up of the Left Knee and Follow-up of the Right Knee       This is a 74-year-old male who returns the office today because of bilateral knee pain.  He is known to suffer from arthritis of each knee.  Fortunately intra-articular injection of Kenalog performed at the last visit 4+ months ago were of dramatic benefit.  Just over the past few weeks as he noted increasing discomfort.  He has had no new injury.  He has not noted any redness warmth denies any fevers or chills.    This patient's past medical, social, and family history were reviewed as well as a review of systems including updates on the patient's information encounter sheet  Patient denies a history of diabetes    At his last office visit we discussed with the patient the calcific changes within his popliteal arteries consistent with atherosclerotic disease.  He discussed this with his PCP and subsequently underwent a CT cardiac scoring which was greater than 4000.  He recently had a stress test performed which he failed and he will be undergoing a cardiac cath next week    Physical Examination  Constitutional: Patient's height and weight reviewed, appears well kempt  Psychiatric: Alert and oriented ×3, appropriate mood and behavior  Pulmonary: Breathing appears nonlabored, no apparent distress  Lymphatic: No appreciable lymphadenopathy to both the upper and lower extremities  Skin: No open lesions, rashes, ulcerations  Neurologic: Gross motor and sensory exam appear intact (except for abnormalities noted in the below muscle skeletal exam)    Musculoskeletal: There is satisfactory range of motion of each hip without groin or thigh pain elicited.  Each knee demonstrates significant patellofemoral crepitus with range of motion.  There is a lack of extension of a few degrees bilaterally with flexion on the right to 110 degrees and the left 115 degrees.  No gross ligamentous  instability.    Assessment: Arthritis bilateral knees    Plan: An extended discussion ensued with the patient regarding their bilateral knee arthritic condition. Both nonoperative and operative treatment options were discussed. The patient will continue with modifications of activities as well as an exercise program. As the patient desired a intra-articular injection of Kenalog/lidocaine were performed into each knee today under sterile conditions and tolerated well. The patient will observe to see if these injections are benefit and follow up with us on a when necessary basis.    Left Knee     Right Knee     L Inj/Asp: R knee on 8/21/2024 10:17 AM  Indications: pain  Details: 22 G needle, anterolateral approach  Medications: 40 mg triamcinolone acetonide 40 mg/mL; 2 mL lidocaine 10 mg/mL (1 %)  Consent was given by the patient. Patient was prepped and draped in the usual sterile fashion.       L Inj/Asp: L knee on 8/21/2024 10:17 AM  Indications: pain  Details: 22 G needle, anterolateral approach  Medications: 40 mg triamcinolone acetonide 40 mg/mL; 2 mL lidocaine 10 mg/mL (1 %)  Consent was given by the patient. Patient was prepped and draped in the usual sterile fashion.               Current Outpatient Medications:     amphetamine-dextroamphetamine XR (Adderall XR) 20 mg 24 hr capsule, Take 1 capsule (20 mg) by mouth once daily in the morning. Do not crush or chew., Disp: 30 capsule, Rfl: 0    atorvastatin (Lipitor) 40 mg tablet, TAKE 1 TABLET BY MOUTH ONCE DAILY, Disp: 90 tablet, Rfl: 3    clonazePAM (KlonoPIN) 0.5 mg tablet, Take 1 tablet (0.5 mg) by mouth every 12 hours if needed for anxiety., Disp: 30 tablet, Rfl: 0    fluticasone (Flonase) 50 mcg/actuation nasal spray, Administer 1 spray into each nostril once daily. Shake gently. Before first use, prime pump. After use, clean tip and replace cap., Disp: , Rfl:     latanoprost (Xalatan) 0.005 % ophthalmic solution, Administer 1 drop into both eyes once  daily., Disp: , Rfl:     Linzess 145 mcg capsule, Take 1 capsule (145 mcg) by mouth once daily in the morning. Take before meals., Disp: 90 capsule, Rfl: 3    lisinopril 30 mg tablet, Take 1 tablet (30 mg) by mouth once daily., Disp: 90 tablet, Rfl: 3    metoprolol succinate XL (Toprol-XL) 25 mg 24 hr tablet, Take 1 tablet (25 mg) by mouth once daily., Disp: 90 tablet, Rfl: 3    multivit-minerals/folic acid (CENTRUM ADULTS ORAL), , Disp: , Rfl:     traZODone (Desyrel) 150 mg tablet, Take 1 tablet (150 mg) by mouth once daily at bedtime., Disp: 90 tablet, Rfl: 1    venlafaxine XR (Effexor-XR) 150 mg 24 hr capsule, Take 1 capsule (150 mg) by mouth once daily., Disp: 90 capsule, Rfl: 3

## 2024-08-26 ENCOUNTER — HOSPITAL ENCOUNTER (OUTPATIENT)
Facility: HOSPITAL | Age: 74
Setting detail: OUTPATIENT SURGERY
Discharge: HOME | End: 2024-08-26
Attending: INTERNAL MEDICINE | Admitting: INTERNAL MEDICINE
Payer: MEDICARE

## 2024-08-26 ENCOUNTER — HOSPITAL ENCOUNTER (OUTPATIENT)
Dept: CARDIOLOGY | Facility: HOSPITAL | Age: 74
Discharge: HOME | End: 2024-08-26
Payer: MEDICARE

## 2024-08-26 VITALS
OXYGEN SATURATION: 96 % | DIASTOLIC BLOOD PRESSURE: 83 MMHG | TEMPERATURE: 96.8 F | SYSTOLIC BLOOD PRESSURE: 144 MMHG | HEART RATE: 52 BPM | WEIGHT: 177.91 LBS | HEIGHT: 70 IN | BODY MASS INDEX: 25.47 KG/M2 | RESPIRATION RATE: 16 BRPM

## 2024-08-26 DIAGNOSIS — R93.1 ELEVATED CORONARY ARTERY CALCIUM SCORE: ICD-10-CM

## 2024-08-26 DIAGNOSIS — I25.118 ATHEROSCLEROTIC HEART DISEASE OF NATIVE CORONARY ARTERY WITH OTHER FORMS OF ANGINA PECTORIS (CMS-HCC): ICD-10-CM

## 2024-08-26 DIAGNOSIS — I25.10 CORONARY ARTERY DISEASE INVOLVING NATIVE CORONARY ARTERY OF NATIVE HEART WITHOUT ANGINA PECTORIS: Chronic | ICD-10-CM

## 2024-08-26 DIAGNOSIS — R94.39 ABNORMAL STRESS ELECTROCARDIOGRAM TEST: Primary | ICD-10-CM

## 2024-08-26 DIAGNOSIS — F98.8 ATTENTION DEFICIT DISORDER, UNSPECIFIED HYPERACTIVITY PRESENCE: ICD-10-CM

## 2024-08-26 DIAGNOSIS — F41.1 GENERALIZED ANXIETY DISORDER: ICD-10-CM

## 2024-08-26 PROCEDURE — 99152 MOD SED SAME PHYS/QHP 5/>YRS: CPT | Performed by: INTERNAL MEDICINE

## 2024-08-26 PROCEDURE — 2500000004 HC RX 250 GENERAL PHARMACY W/ HCPCS (ALT 636 FOR OP/ED): Performed by: INTERNAL MEDICINE

## 2024-08-26 PROCEDURE — 99153 MOD SED SAME PHYS/QHP EA: CPT | Performed by: INTERNAL MEDICINE

## 2024-08-26 PROCEDURE — 93005 ELECTROCARDIOGRAM TRACING: CPT | Mod: 59

## 2024-08-26 PROCEDURE — 2720000007 HC OR 272 NO HCPCS: Performed by: INTERNAL MEDICINE

## 2024-08-26 PROCEDURE — 2500000005 HC RX 250 GENERAL PHARMACY W/O HCPCS: Performed by: INTERNAL MEDICINE

## 2024-08-26 PROCEDURE — 2500000004 HC RX 250 GENERAL PHARMACY W/ HCPCS (ALT 636 FOR OP/ED): Performed by: NURSE PRACTITIONER

## 2024-08-26 PROCEDURE — 2550000001 HC RX 255 CONTRASTS: Performed by: INTERNAL MEDICINE

## 2024-08-26 PROCEDURE — 7100000009 HC PHASE TWO TIME - INITIAL BASE CHARGE: Performed by: INTERNAL MEDICINE

## 2024-08-26 PROCEDURE — 2500000001 HC RX 250 WO HCPCS SELF ADMINISTERED DRUGS (ALT 637 FOR MEDICARE OP): Performed by: NURSE PRACTITIONER

## 2024-08-26 PROCEDURE — 93458 L HRT ARTERY/VENTRICLE ANGIO: CPT | Performed by: INTERNAL MEDICINE

## 2024-08-26 PROCEDURE — C1887 CATHETER, GUIDING: HCPCS | Performed by: INTERNAL MEDICINE

## 2024-08-26 PROCEDURE — 7100000010 HC PHASE TWO TIME - EACH INCREMENTAL 1 MINUTE: Performed by: INTERNAL MEDICINE

## 2024-08-26 PROCEDURE — 93010 ELECTROCARDIOGRAM REPORT: CPT | Performed by: INTERNAL MEDICINE

## 2024-08-26 PROCEDURE — C1760 CLOSURE DEV, VASC: HCPCS | Performed by: INTERNAL MEDICINE

## 2024-08-26 PROCEDURE — C1894 INTRO/SHEATH, NON-LASER: HCPCS | Performed by: INTERNAL MEDICINE

## 2024-08-26 RX ORDER — VERAPAMIL HYDROCHLORIDE 2.5 MG/ML
INJECTION, SOLUTION INTRAVENOUS AS NEEDED
Status: DISCONTINUED | OUTPATIENT
Start: 2024-08-26 | End: 2024-08-26 | Stop reason: HOSPADM

## 2024-08-26 RX ORDER — FENTANYL CITRATE 50 UG/ML
INJECTION, SOLUTION INTRAMUSCULAR; INTRAVENOUS AS NEEDED
Status: DISCONTINUED | OUTPATIENT
Start: 2024-08-26 | End: 2024-08-26 | Stop reason: HOSPADM

## 2024-08-26 RX ORDER — MIDAZOLAM HYDROCHLORIDE 1 MG/ML
INJECTION, SOLUTION INTRAMUSCULAR; INTRAVENOUS AS NEEDED
Status: DISCONTINUED | OUTPATIENT
Start: 2024-08-26 | End: 2024-08-26 | Stop reason: HOSPADM

## 2024-08-26 RX ORDER — SODIUM CHLORIDE 9 MG/ML
50 INJECTION, SOLUTION INTRAVENOUS CONTINUOUS
Status: DISCONTINUED | OUTPATIENT
Start: 2024-08-26 | End: 2024-08-26 | Stop reason: HOSPADM

## 2024-08-26 RX ORDER — SODIUM CHLORIDE 9 MG/ML
100 INJECTION, SOLUTION INTRAVENOUS CONTINUOUS
Status: DISCONTINUED | OUTPATIENT
Start: 2024-08-26 | End: 2024-08-26 | Stop reason: HOSPADM

## 2024-08-26 RX ORDER — IODIXANOL 320 MG/ML
INJECTION, SOLUTION INTRAVASCULAR AS NEEDED
Status: DISCONTINUED | OUTPATIENT
Start: 2024-08-26 | End: 2024-08-26 | Stop reason: HOSPADM

## 2024-08-26 RX ORDER — LIDOCAINE HYDROCHLORIDE 20 MG/ML
INJECTION, SOLUTION INFILTRATION; PERINEURAL AS NEEDED
Status: DISCONTINUED | OUTPATIENT
Start: 2024-08-26 | End: 2024-08-26 | Stop reason: HOSPADM

## 2024-08-26 RX ORDER — NAPROXEN SODIUM 220 MG/1
81 TABLET, FILM COATED ORAL ONCE
Status: COMPLETED | OUTPATIENT
Start: 2024-08-26 | End: 2024-08-26

## 2024-08-26 RX ORDER — ACETAMINOPHEN 325 MG/1
650 TABLET ORAL EVERY 6 HOURS PRN
Status: DISCONTINUED | OUTPATIENT
Start: 2024-08-26 | End: 2024-08-26 | Stop reason: HOSPADM

## 2024-08-26 RX ORDER — HYDROCHLOROTHIAZIDE 25 MG/1
25 TABLET ORAL DAILY
Qty: 30 TABLET | Refills: 1 | Status: SHIPPED | OUTPATIENT
Start: 2024-08-26 | End: 2024-10-25

## 2024-08-26 ASSESSMENT — PAIN SCALES - GENERAL
PAINLEVEL_OUTOF10: 0 - NO PAIN
PAINLEVEL_OUTOF10: 0 - NO PAIN
PAINLEVEL_OUTOF10: 2
PAINLEVEL_OUTOF10: 0 - NO PAIN
PAINLEVEL_OUTOF10: 2
PAINLEVEL_OUTOF10: 0 - NO PAIN

## 2024-08-26 ASSESSMENT — COLUMBIA-SUICIDE SEVERITY RATING SCALE - C-SSRS
1. IN THE PAST MONTH, HAVE YOU WISHED YOU WERE DEAD OR WISHED YOU COULD GO TO SLEEP AND NOT WAKE UP?: NO
6. HAVE YOU EVER DONE ANYTHING, STARTED TO DO ANYTHING, OR PREPARED TO DO ANYTHING TO END YOUR LIFE?: NO
2. HAVE YOU ACTUALLY HAD ANY THOUGHTS OF KILLING YOURSELF?: NO

## 2024-08-26 ASSESSMENT — PAIN - FUNCTIONAL ASSESSMENT
PAIN_FUNCTIONAL_ASSESSMENT: 0-10

## 2024-08-26 NOTE — POST-PROCEDURE NOTE
Physician Transition of Care Summary  Invasive Cardiovascular Lab    Procedure Date: 8/26/2024  Attending:    * Fortino Starr - Primary  Resident/Fellow/Other Assistant: Surgeons and Role:  * No surgeons found with a matching role *    Indications:   Pre-op Diagnosis      * Abnormal stress electrocardiogram test [R94.39]     * Elevated coronary artery calcium score [R93.1]    Post-procedure diagnosis:   Post-op Diagnosis     * Abnormal stress electrocardiogram test [R94.39]     * Elevated coronary artery calcium score [R93.1]    Procedure(s):   Left Heart Cath  28136 - MO CATH PLMT L HRT & ARTS W/NJX & ANGIO IMG S&I    MO CATH PLMT L HRT & ARTS W/NJX & ANGIO IMG S&I [64165]    Procedure Findings:   -Moderate diffuse nonobstructive coronary artery disease.  - Focal ostial diagonal moderate stenosis  - Focal mid RCA and distal RCA moderate stenosis  - Elevated LVEDP of 33 mmHg  - No LV-Ao gradient on pullback.  - LVEF 50 to 55% with no wall motion abnormalities on ventriculography.    Description of the Procedure:   Left heart catheterization and coronary angiography via ultrasound-guided right radial access.    Complications:   None    Stents/Implants:   Implants       No implant documentation for this case.            Anticoagulation/Antiplatelet Plan:   Aspirin 81 mg once per day    Estimated Blood Loss:   * No values recorded between 8/26/2024 10:07 AM and 8/26/2024 10:46 AM *    Anesthesia: Moderate Sedation Anesthesia Staff: No anesthesia staff entered.    Any Specimen(s) Removed:   No specimens collected during this procedure.    Disposition:   To home      Electronically signed by: Stevie Garcia MD, 8/26/2024 10:46 AM

## 2024-08-26 NOTE — H&P
History and Physical   Pre Surgical Review (< 30 days)      History & Physical Reviewed    I have reviewed the History and Physical dated:  8/7/2024   History and Physical reviewed and relevant findings noted. Patient examined to review pertinent physical  findings.: No significant changes   Home Medications Reviewed: see medication list below   Allergies Reviewed: no changes noted      Home Medications  Current Outpatient Medications   Medication Instructions    amphetamine-dextroamphetamine XR (Adderall XR) 20 mg 24 hr capsule 20 mg, oral, Every morning, Do not crush or chew.    atorvastatin (LIPITOR) 40 mg, oral, Daily    clonazePAM (KLONOPIN) 0.5 mg, oral, Every 12 hours PRN    fluticasone (Flonase) 50 mcg/actuation nasal spray 1 spray, Each Nostril, Daily, Shake gently. Before first use, prime pump. After use, clean tip and replace cap.    latanoprost (Xalatan) 0.005 % ophthalmic solution 1 drop, Both Eyes, Daily    Linzess 145 mcg, oral, Daily before breakfast    lisinopril 30 mg, oral, Daily    metoprolol succinate XL (TOPROL-XL) 25 mg, oral, Daily    multivit-minerals/folic acid (CENTRUM ADULTS ORAL) No dose, route, or frequency recorded.    traZODone (DESYREL) 150 mg, oral, Nightly    venlafaxine XR (EFFEXOR-XR) 150 mg, oral, Daily        Allergies  Allergies   Allergen Reactions    Other Runny nose     SEASONAL allergies    Sulfa (Sulfonamide Antibiotics) Rash       Physical Exam  Physical Exam  Constitutional:       General: He is not in acute distress.  Cardiovascular:      Rate and Rhythm: Bradycardia present.      Comments: + pulses all extremities.  Pulmonary:      Effort: Pulmonary effort is normal. No respiratory distress.      Comments: Clear bilaterally.  Abdominal:      General: Bowel sounds are normal.   Skin:     General: Skin is warm and dry.   Neurological:      General: No focal deficit present.      Mental Status: He is alert and oriented to person, place, and time.    Psychiatric:         Mood and Affect: Mood normal.         Behavior: Behavior normal.        Airway/Sedation Assessment     Assessment by anesthesia N/A     ·  Mouth Opening OK yes      Neck Flexibility OK yes      Loose Teeth No   ·  Oropharyngeal Classification Grade III   ·  ASA PS Classification ASA III - Patient with severe systemic disease       Sedation Plan Moderate     Risks, benefits, and alternatives discussed with patient.     ERAS (Enhanced Recovery After Surgery):  ·  ERAS Patient: No      Consent:   COVID-19 Consent:  ·  COVID-19 Risk Consent Surgeon has reviewed key risks related to the risk of johanna COVID-19 and if they contract COVID-19 what the risks are.     Ritu Marcos, APRN-CNP

## 2024-08-26 NOTE — Clinical Note
Closure device placed in the right radial artery. Site closed by radial compression system. 13cc of air in TR Band

## 2024-08-26 NOTE — DISCHARGE INSTRUCTIONS
CARDIAC CATHETERIZATION DISCHARGE INSTRUCTIONS     FOR SUDDEN AND SEVERE CHEST PAIN, SHORTNESS OF BREATH, EXCESSIVE BLEEDING, SIGNS OF STROKE, OR CHANGES IN MENTAL STATUS YOU SHOULD CALL 911 IMMEDIATELY.     If your provider has prescribed aspirin and/or clopidogrel (Plavix), or prasugrel (Effient), or ticagrelor (Brilinta), DO NOT STOP THESE MEDICATIONS for any reason without talking to your cardiologist first. If any of these were prescribed, you must take them every day without missing a single dose. If you are getting low on these medications, contact your provider immediately for a refill.     FOR NEXT 24 HOURS  - Upon discharge, you should return home and rest for the remainder of the day and evening. You do not have to stay on bed rest but should not be very active.  It is recommended a responsible adult be with you for the first 24 hours after the procedure.    - No driving for 24 hours after procedure. Please arrange for someone to drive you home from the hospital today.     - Do not drive, operate machinery, or use power tools for 24 hours after your procedure.     - Do not make any legal decisions for 24 hours after your procedure.     - Do not drink alcoholic beverages for 24 hours after your procedure.    WOUND CARE   *FOR RADIAL (WRIST) ACCESS*  ·      No lifting anything with affected arm, no bending or flexing affected wrist for 24 hours - for example, treat your wrist as if it is sprained.        ·      No lifting greater that 5 pounds with your affected arm for 5 days.  ·      Do not engage in vigorous activities (tennis, golf, bowling, weights) for at least 5 days after the procedure.  ·      Do not submerge the wrist in water for 7 days after the procedure.  ·      You should expect mild tingling in your hand and tenderness at the puncture site for up to 3 days.    - The transparent dressing should be removed from the site 24 hours after the procedure.  Wash the site gently with soap and  water. Rinse well and pat dry. Keep the area clean and dry. You may apply a Band-Aid to the site. Avoid lotions, ointments, or powders until fully healed.     - You may shower the day after your procedure.      - It is normal to notice a small bruise around the puncture site and/or a small grape sized or smaller lump. Any large bruising or large lump warrants a call to the office.     - If bleeding should occur apply pressure to the affected area for 10 minutes.  If the bleeding stops notify your physician.  If there is a large amount of bleeding or spurting of blood CALL 911 immediately.  DO NOT drive yourself to the hospital.    - You may experience some tenderness, bruising or minimal inflammation.  If you have any concerns, you may contact the Cath Lab or if any of these symptoms become excessive, contact your cardiologist or go to the emergency room.     OTHER INSTRUCTIONS  - You may take acetaminophen (Tylenol) as directed for discomfort.  If pain is not relieved with acetaminophen (Tylenol), contact your doctor.    - If you notice or experience any of the following, you should notify your doctor or seek medical attention  Chest pain or discomfort  Change in mental status or weakness in extremities.  Dizziness, light headedness, or feeling faint.  Change in the site where the procedure was performed, such as bleeding or an increased area of bruising or swelling.  Tingling, numbness, pain, or coolness in the leg/arm beyond the site where the procedure was performed.  Signs of infection (i.e. shaking chills, temperature > 100 degrees Fahrenheit, warmth, redness) in the leg/arm area where the procedure was performed.  Changes in urination   Bloody or black stools  Vomiting blood  Severe nose bleeds  Any excessive bleeding    - Please follow up with Dr. Ramicone as scheduled or sooner if needed, [phone].

## 2024-08-26 NOTE — NURSING NOTE
TR band removed at 1245. Slight swelling noted proximal to insertion site. NP aware and at bedside. Site soft, brusing noted.  Site dressed with pressure dressing, continuing to monitor. Discharge instructions reviewed with patient and spouse.     1330: Right radial site stable. Site soft. Stable for discharge. Patient ambulated in recovery space. IV access removed. Belongings [facility]

## 2024-08-27 ENCOUNTER — TELEPHONE (OUTPATIENT)
Dept: CARDIOLOGY | Facility: CLINIC | Age: 74
End: 2024-08-27
Payer: MEDICARE

## 2024-08-27 NOTE — TELEPHONE ENCOUNTER
Pt called today. His family was concerned by the appearance of his right wrist. LHC was 8/26 and the access was right wrist. Pt states he is not concerned though. Mild swelling, no lumps or bumps. Pt has bruising and occasional mild pain. He is moving his fingers fine, his fingers/wrist/arm are NOT cold.     He is also concerned about taking hydrochlorothiazide 25 daily due to hx of CKD. He sent a message to nephrology for their advice. I saw a BMP was ordered yesterday, when should he get labs, do you think that him taking hydrochlorothiazide is temporary? Last BMP he had was on 8/19, creatinine was 1.56 and GFR was 46.

## 2024-08-28 ENCOUNTER — APPOINTMENT (OUTPATIENT)
Dept: CARDIOLOGY | Facility: CLINIC | Age: 74
End: 2024-08-28
Payer: MEDICARE

## 2024-08-29 DIAGNOSIS — R94.39 ABNORMAL STRESS ELECTROCARDIOGRAM TEST: Chronic | ICD-10-CM

## 2024-08-29 RX ORDER — CLONAZEPAM 0.5 MG/1
0.5 TABLET ORAL EVERY 12 HOURS PRN
Qty: 30 TABLET | Refills: 0 | Status: SHIPPED | OUTPATIENT
Start: 2024-08-29

## 2024-08-29 RX ORDER — DEXTROAMPHETAMINE SACCHARATE, AMPHETAMINE ASPARTATE MONOHYDRATE, DEXTROAMPHETAMINE SULFATE AND AMPHETAMINE SULFATE 5; 5; 5; 5 MG/1; MG/1; MG/1; MG/1
20 CAPSULE, EXTENDED RELEASE ORAL EVERY MORNING
Qty: 30 CAPSULE | Refills: 0 | Status: SHIPPED | OUTPATIENT
Start: 2024-08-29 | End: 2024-09-28

## 2024-08-30 RX ORDER — BUMETANIDE 1 MG/1
1 TABLET ORAL DAILY
Qty: 30 TABLET | Refills: 2 | Status: SHIPPED | OUTPATIENT
Start: 2024-08-30

## 2024-08-31 LAB
ATRIAL RATE: 52 BPM
P AXIS: 39 DEGREES
P OFFSET: 191 MS
P ONSET: 134 MS
PR INTERVAL: 150 MS
Q ONSET: 209 MS
QRS COUNT: 9 BEATS
QRS DURATION: 102 MS
QT INTERVAL: 452 MS
QTC CALCULATION(BAZETT): 420 MS
QTC FREDERICIA: 430 MS
R AXIS: -32 DEGREES
T AXIS: 3 DEGREES
T OFFSET: 435 MS
VENTRICULAR RATE: 52 BPM

## 2024-09-04 ENCOUNTER — TELEPHONE (OUTPATIENT)
Dept: PRIMARY CARE | Facility: CLINIC | Age: 74
End: 2024-09-04
Payer: MEDICARE

## 2024-09-04 NOTE — TELEPHONE ENCOUNTER
Pts wife called stating that they're pharmacy is out of Adderall 20mg tablets. There is a nation wide shortage and they wont have any until end of September. They do have 10MG tablets if you can send in for 10MG BID?

## 2024-09-05 ENCOUNTER — LAB (OUTPATIENT)
Dept: LAB | Facility: LAB | Age: 74
End: 2024-09-05
Payer: MEDICARE

## 2024-09-05 DIAGNOSIS — F98.8 ATTENTION DEFICIT DISORDER, UNSPECIFIED HYPERACTIVITY PRESENCE: Primary | ICD-10-CM

## 2024-09-05 DIAGNOSIS — I25.10 CORONARY ARTERY DISEASE INVOLVING NATIVE CORONARY ARTERY OF NATIVE HEART WITHOUT ANGINA PECTORIS: Chronic | ICD-10-CM

## 2024-09-05 PROCEDURE — 36415 COLL VENOUS BLD VENIPUNCTURE: CPT

## 2024-09-05 PROCEDURE — 80048 BASIC METABOLIC PNL TOTAL CA: CPT

## 2024-09-05 RX ORDER — DEXTROAMPHETAMINE SACCHARATE, AMPHETAMINE ASPARTATE MONOHYDRATE, DEXTROAMPHETAMINE SULFATE AND AMPHETAMINE SULFATE 2.5; 2.5; 2.5; 2.5 MG/1; MG/1; MG/1; MG/1
20 CAPSULE, EXTENDED RELEASE ORAL EVERY MORNING
Qty: 60 CAPSULE | Refills: 0 | Status: SHIPPED | OUTPATIENT
Start: 2024-09-05 | End: 2024-10-05

## 2024-09-06 LAB
ANION GAP SERPL CALC-SCNC: 12 MMOL/L (ref 10–20)
BUN SERPL-MCNC: 41 MG/DL (ref 6–23)
CALCIUM SERPL-MCNC: 9.7 MG/DL (ref 8.6–10.6)
CHLORIDE SERPL-SCNC: 102 MMOL/L (ref 98–107)
CO2 SERPL-SCNC: 29 MMOL/L (ref 21–32)
CREAT SERPL-MCNC: 1.71 MG/DL (ref 0.5–1.3)
EGFRCR SERPLBLD CKD-EPI 2021: 41 ML/MIN/1.73M*2
GLUCOSE SERPL-MCNC: 88 MG/DL (ref 74–99)
POTASSIUM SERPL-SCNC: 4.7 MMOL/L (ref 3.5–5.3)
SODIUM SERPL-SCNC: 138 MMOL/L (ref 136–145)

## 2024-09-12 ENCOUNTER — TELEPHONE (OUTPATIENT)
Dept: CARDIOLOGY | Facility: CLINIC | Age: 74
End: 2024-09-12
Payer: MEDICARE

## 2024-09-12 NOTE — TELEPHONE ENCOUNTER
Answering service message:  Has Questions about his medication, followed directions last Friday to stop Meds and waiting on next step

## 2024-09-23 ENCOUNTER — OFFICE VISIT (OUTPATIENT)
Dept: CARDIOLOGY | Facility: CLINIC | Age: 74
End: 2024-09-23
Payer: MEDICARE

## 2024-09-23 VITALS
OXYGEN SATURATION: 93 % | HEIGHT: 70 IN | BODY MASS INDEX: 25.91 KG/M2 | DIASTOLIC BLOOD PRESSURE: 70 MMHG | SYSTOLIC BLOOD PRESSURE: 108 MMHG | WEIGHT: 181 LBS | HEART RATE: 63 BPM

## 2024-09-23 DIAGNOSIS — E78.2 MIXED HYPERLIPIDEMIA: ICD-10-CM

## 2024-09-23 DIAGNOSIS — I25.10 CORONARY ARTERY DISEASE INVOLVING NATIVE CORONARY ARTERY OF NATIVE HEART WITHOUT ANGINA PECTORIS: Chronic | ICD-10-CM

## 2024-09-23 DIAGNOSIS — R93.1 ELEVATED CORONARY ARTERY CALCIUM SCORE: ICD-10-CM

## 2024-09-23 DIAGNOSIS — I47.10 SVT (SUPRAVENTRICULAR TACHYCARDIA) (CMS-HCC): Primary | Chronic | ICD-10-CM

## 2024-09-23 PROCEDURE — 1159F MED LIST DOCD IN RCRD: CPT | Performed by: INTERNAL MEDICINE

## 2024-09-23 PROCEDURE — 3078F DIAST BP <80 MM HG: CPT | Performed by: INTERNAL MEDICINE

## 2024-09-23 PROCEDURE — 1036F TOBACCO NON-USER: CPT | Performed by: INTERNAL MEDICINE

## 2024-09-23 PROCEDURE — 3074F SYST BP LT 130 MM HG: CPT | Performed by: INTERNAL MEDICINE

## 2024-09-23 PROCEDURE — 99213 OFFICE O/P EST LOW 20 MIN: CPT | Performed by: INTERNAL MEDICINE

## 2024-09-23 PROCEDURE — 1126F AMNT PAIN NOTED NONE PRSNT: CPT | Performed by: INTERNAL MEDICINE

## 2024-09-23 ASSESSMENT — ENCOUNTER SYMPTOMS
DEPRESSION: 0
LOSS OF SENSATION IN FEET: 0
OCCASIONAL FEELINGS OF UNSTEADINESS: 0

## 2024-09-23 ASSESSMENT — PAIN SCALES - GENERAL: PAINLEVEL: 0-NO PAIN

## 2024-09-23 NOTE — PATIENT INSTRUCTIONS
Monitor your blood pressure at home.  If the systolic BP (top number) is less than 110 mmHg, then decrease Lisinopril 30 mg tabs to 1/2 tab.    Lab tests in Dec/Jan.    Follow up with Dr. Ramicone in 6-8 months.

## 2024-09-23 NOTE — LETTER
"September 23, 2024     Aayush Lopez MD  [address]  [facility], [address]  [place] [address]    Patient: Paul Prather   YOB: 1950   Date of Visit: 9/23/2024       Dear Dr. Aayush Lopez MD:    Thank you for referring Paul Prather to me for evaluation. Below are my notes for this consultation.  If you have questions, please do not hesitate to call me. I look forward to following your patient along with you.       Sincerely,     James C Ramicone, DO      CC: No Recipients  ______________________________________________________________________________________    History Of Present Illness:      This is a 74-year-old male with episodes of atrial tachycardia.  He presents for a follow-up evaluation after undergoing a cardiac catheterization.  Currently he has no complaints of chest pain or palpitations.  He does feel anxious at times, and also has a history of ADHD.      Review of Systems  Other review of systems negative     Last Recorded Vitals:      3/25/2024    10:03 AM 4/15/2024     4:59 PM 7/12/2024    10:06 AM 7/18/2024     4:30 PM 8/7/2024     9:27 AM 8/26/2024     9:54 AM 9/23/2024    11:37 AM   Vitals   Systolic 124 122 121 122 110 144 108   Diastolic 70 76 63 72 63 83 70   Heart Rate 74 61 54 63 52 52 63   Temp  36.5 °C (97.7 °F) 36.3 °C (97.4 °F)   36 °C (96.8 °F)    Resp      16    Height (in) 1.778 m (5' 10\") 1.778 m (5' 10\") 1.778 m (5' 10\") 1.778 m (5' 10\") 1.778 m (5' 10\") 1.778 m (5' 10\") 1.778 m (5' 10\")   Weight (lb) 196 195 183.8 182 181 177.91 181   BMI 28.12 kg/m2 27.98 kg/m2 26.37 kg/m2 26.11 kg/m2 25.97 kg/m2 25.53 kg/m2 25.97 kg/m2   BSA (m2) 2.1 m2 2.09 m2 2.03 m2 2.02 m2 2.01 m2 2 m2 2.01 m2   Visit Report Report Report Report Report    Report Report  Report     Allergies:  Other and Sulfa (sulfonamide antibiotics)    Outpatient Medications:  Current Outpatient Medications   Medication Instructions   • amphetamine-dextroamphetamine XR (Adderall XR) 10 mg 24 hr " capsule 20 mg, oral, Every morning, Do not crush or chew.   • atorvastatin (LIPITOR) 40 mg, oral, Daily   • clonazePAM (KLONOPIN) 0.5 mg, oral, Every 12 hours PRN   • fluticasone (Flonase) 50 mcg/actuation nasal spray 1 spray, Each Nostril, Daily, Shake gently. Before first use, prime pump. After use, clean tip and replace cap.   • latanoprost (Xalatan) 0.005 % ophthalmic solution 1 drop, Both Eyes, Daily   • lisinopril 30 mg, oral, Daily   • metoprolol succinate XL (TOPROL-XL) 25 mg, oral, Daily   • multivit-minerals/folic acid (CENTRUM ADULTS ORAL) No dose, route, or frequency recorded.   • traZODone (DESYREL) 150 mg, oral, Nightly   • venlafaxine XR (EFFEXOR-XR) 150 mg, oral, Daily     Physical Exam:    General Appearance:  Alert, oriented, no distress  Skin:  Warm and dry  Head and Neck:  No elevation of JVP, no carotid bruits  Cardiac Exam:  Rhythm is regular, S1 and S2 are normal, no murmur S3 or S4  Lungs:  Clear to auscultation  Extremities:  no edema  Neurologic:  No focal deficits  Psychiatric:  Appropriate mood and behavior    Cardiology Tests:  I have personally review the diagnostic cardiac testing and my interpretation is as follows:    EKG November 9, 2022: Sinus tachycardia  Echocardiogram December 2022: Normal left ventricular function with mild aortic valve regurgitation  Holter monitor January 15, 2024: Sinus rhythm with brief runs of atrial tachycardia up to 9 beats in duration  Echocardiogram January 2024: Normal left ventricular function, mild aortic valve regurgitation  Coronary artery calcium score: 4049  Exercise treadmill stress test: No ischemic changes by EKG.  The patient became hypotensive during the recovery phase.  Cardiac catheterization August 2024: Moderate CAD involving LAD, left circumflex, and RCA    Assessment/Plan  Problem List Items Addressed This Visit             ICD-10-CM    SVT (supraventricular tachycardia) (CMS-HCC) - Primary (Chronic) I47.10     No recurrences of the  atrial tachycardia.  Continue current medication regimen         Coronary artery disease involving native coronary artery of native heart without angina pectoris (Chronic) I25.10     The patient has nonobstructive coronary artery disease based on the cardiac catheterization, and he is on atorvastatin 40 mg daily.  We will plan for a lipid panel in the next 2 to 3 months and then adjust the dosage of atorvastatin accordingly.  Office follow-up in 6 to 8 months.         Elevated coronary artery calcium score (Chronic) R93.1    Relevant Orders    Comprehensive metabolic panel    Lipid panel     Other Visit Diagnoses         Codes    Mixed hyperlipidemia     E78.2    Relevant Orders    Lipid panel          James C Ramicone, DO

## 2024-09-23 NOTE — PROGRESS NOTES
"History Of Present Illness:      This is a 74-year-old male with episodes of atrial tachycardia.  He presents for a follow-up evaluation after undergoing a cardiac catheterization.  Currently he has no complaints of chest pain or palpitations.  He does feel anxious at times, and also has a history of ADHD.      Review of Systems  Other review of systems negative     Last Recorded Vitals:      3/25/2024    10:03 AM 4/15/2024     4:59 PM 7/12/2024    10:06 AM 7/18/2024     4:30 PM 8/7/2024     9:27 AM 8/26/2024     9:54 AM 9/23/2024    11:37 AM   Vitals   Systolic 124 122 121 122 110 144 108   Diastolic 70 76 63 72 63 83 70   Heart Rate 74 61 54 63 52 52 63   Temp  36.5 °C (97.7 °F) 36.3 °C (97.4 °F)   36 °C (96.8 °F)    Resp      16    Height (in) 1.778 m (5' 10\") 1.778 m (5' 10\") 1.778 m (5' 10\") 1.778 m (5' 10\") 1.778 m (5' 10\") 1.778 m (5' 10\") 1.778 m (5' 10\")   Weight (lb) 196 195 183.8 182 181 177.91 181   BMI 28.12 kg/m2 27.98 kg/m2 26.37 kg/m2 26.11 kg/m2 25.97 kg/m2 25.53 kg/m2 25.97 kg/m2   BSA (m2) 2.1 m2 2.09 m2 2.03 m2 2.02 m2 2.01 m2 2 m2 2.01 m2   Visit Report Report Report Report Report    Report Report  Report     Allergies:  Other and Sulfa (sulfonamide antibiotics)    Outpatient Medications:  Current Outpatient Medications   Medication Instructions    amphetamine-dextroamphetamine XR (Adderall XR) 10 mg 24 hr capsule 20 mg, oral, Every morning, Do not crush or chew.    atorvastatin (LIPITOR) 40 mg, oral, Daily    clonazePAM (KLONOPIN) 0.5 mg, oral, Every 12 hours PRN    fluticasone (Flonase) 50 mcg/actuation nasal spray 1 spray, Each Nostril, Daily, Shake gently. Before first use, prime pump. After use, clean tip and replace cap.    latanoprost (Xalatan) 0.005 % ophthalmic solution 1 drop, Both Eyes, Daily    lisinopril 30 mg, oral, Daily    metoprolol succinate XL (TOPROL-XL) 25 mg, oral, Daily    multivit-minerals/folic acid (CENTRUM ADULTS ORAL) No dose, route, or frequency recorded.    " traZODone (DESYREL) 150 mg, oral, Nightly    venlafaxine XR (EFFEXOR-XR) 150 mg, oral, Daily     Physical Exam:    General Appearance:  Alert, oriented, no distress  Skin:  Warm and dry  Head and Neck:  No elevation of JVP, no carotid bruits  Cardiac Exam:  Rhythm is regular, S1 and S2 are normal, no murmur S3 or S4  Lungs:  Clear to auscultation  Extremities:  no edema  Neurologic:  No focal deficits  Psychiatric:  Appropriate mood and behavior    Cardiology Tests:  I have personally review the diagnostic cardiac testing and my interpretation is as follows:    EKG November 9, 2022: Sinus tachycardia  Echocardiogram December 2022: Normal left ventricular function with mild aortic valve regurgitation  Holter monitor January 15, 2024: Sinus rhythm with brief runs of atrial tachycardia up to 9 beats in duration  Echocardiogram January 2024: Normal left ventricular function, mild aortic valve regurgitation  Coronary artery calcium score: 4049  Exercise treadmill stress test: No ischemic changes by EKG.  The patient became hypotensive during the recovery phase.  Cardiac catheterization August 2024: Moderate CAD involving LAD, left circumflex, and RCA    Assessment/Plan   Problem List Items Addressed This Visit             ICD-10-CM    SVT (supraventricular tachycardia) (CMS-[facility]) - Primary (Chronic) I47.10     No recurrences of the atrial tachycardia.  Continue current medication regimen         Coronary artery disease involving native coronary artery of native heart without angina pectoris (Chronic) I25.10     The patient has nonobstructive coronary artery disease based on the cardiac catheterization, and he is on atorvastatin 40 mg daily.  We will plan for a lipid panel in the next 2 to 3 months and then adjust the dosage of atorvastatin accordingly.  Office follow-up in 6 to 8 months.         Elevated coronary artery calcium score (Chronic) R93.1    Relevant Orders    Comprehensive metabolic panel    Lipid panel      Other Visit Diagnoses         Codes    Mixed hyperlipidemia     E78.2    Relevant Orders    Lipid panel          James C Ramicone, DO

## 2024-09-23 NOTE — ASSESSMENT & PLAN NOTE
The patient has nonobstructive coronary artery disease based on the cardiac catheterization, and he is on atorvastatin 40 mg daily.  We will plan for a lipid panel in the next 2 to 3 months and then adjust the dosage of atorvastatin accordingly.  Office follow-up in 6 to 8 months.

## 2024-09-24 DIAGNOSIS — F98.8 ATTENTION DEFICIT DISORDER, UNSPECIFIED HYPERACTIVITY PRESENCE: ICD-10-CM

## 2024-09-24 DIAGNOSIS — F41.1 GENERALIZED ANXIETY DISORDER: ICD-10-CM

## 2024-09-26 ENCOUNTER — APPOINTMENT (OUTPATIENT)
Dept: DERMATOLOGY | Facility: CLINIC | Age: 74
End: 2024-09-26
Payer: MEDICARE

## 2024-09-26 RX ORDER — CLONAZEPAM 0.5 MG/1
0.5 TABLET ORAL EVERY 12 HOURS PRN
Qty: 30 TABLET | Refills: 0 | Status: SHIPPED | OUTPATIENT
Start: 2024-09-26

## 2024-09-26 RX ORDER — DEXTROAMPHETAMINE SACCHARATE, AMPHETAMINE ASPARTATE MONOHYDRATE, DEXTROAMPHETAMINE SULFATE AND AMPHETAMINE SULFATE 2.5; 2.5; 2.5; 2.5 MG/1; MG/1; MG/1; MG/1
20 CAPSULE, EXTENDED RELEASE ORAL EVERY MORNING
Qty: 60 CAPSULE | Refills: 0 | Status: SHIPPED | OUTPATIENT
Start: 2024-09-26 | End: 2024-10-26

## 2024-10-16 ENCOUNTER — LAB (OUTPATIENT)
Dept: LAB | Facility: LAB | Age: 74
End: 2024-10-16

## 2024-10-16 DIAGNOSIS — F41.1 GENERALIZED ANXIETY DISORDER: ICD-10-CM

## 2024-10-16 DIAGNOSIS — R93.1 ELEVATED CORONARY ARTERY CALCIUM SCORE: ICD-10-CM

## 2024-10-16 DIAGNOSIS — I10 HTN (HYPERTENSION), BENIGN: ICD-10-CM

## 2024-10-16 DIAGNOSIS — I47.10 SVT (SUPRAVENTRICULAR TACHYCARDIA) (CMS-HCC): ICD-10-CM

## 2024-10-16 LAB
ALBUMIN SERPL BCP-MCNC: 4.2 G/DL (ref 3.4–5)
ALP SERPL-CCNC: 106 U/L (ref 33–136)
ALT SERPL W P-5'-P-CCNC: 26 U/L (ref 10–52)
ANION GAP SERPL CALC-SCNC: 13 MMOL/L (ref 10–20)
AST SERPL W P-5'-P-CCNC: 23 U/L (ref 9–39)
BILIRUB SERPL-MCNC: 0.4 MG/DL (ref 0–1.2)
BUN SERPL-MCNC: 32 MG/DL (ref 6–23)
CALCIUM SERPL-MCNC: 9.3 MG/DL (ref 8.6–10.6)
CHLORIDE SERPL-SCNC: 104 MMOL/L (ref 98–107)
CHOLEST SERPL-MCNC: 111 MG/DL (ref 0–199)
CHOLESTEROL/HDL RATIO: 2.1
CO2 SERPL-SCNC: 30 MMOL/L (ref 21–32)
CREAT SERPL-MCNC: 1.45 MG/DL (ref 0.5–1.3)
EGFRCR SERPLBLD CKD-EPI 2021: 50 ML/MIN/1.73M*2
GLUCOSE SERPL-MCNC: 113 MG/DL (ref 74–99)
HDLC SERPL-MCNC: 52.2 MG/DL
LDLC SERPL CALC-MCNC: 38 MG/DL
NON HDL CHOLESTEROL: 59 MG/DL (ref 0–149)
POTASSIUM SERPL-SCNC: 4.5 MMOL/L (ref 3.5–5.3)
PROT SERPL-MCNC: 6.4 G/DL (ref 6.4–8.2)
SODIUM SERPL-SCNC: 142 MMOL/L (ref 136–145)
TRIGL SERPL-MCNC: 105 MG/DL (ref 0–149)
TSH SERPL-ACNC: 2.06 MIU/L (ref 0.44–3.98)
VLDL: 21 MG/DL (ref 0–40)

## 2024-10-16 PROCEDURE — 80053 COMPREHEN METABOLIC PANEL: CPT

## 2024-10-16 PROCEDURE — 36415 COLL VENOUS BLD VENIPUNCTURE: CPT

## 2024-10-16 PROCEDURE — 84443 ASSAY THYROID STIM HORMONE: CPT

## 2024-10-16 PROCEDURE — 80061 LIPID PANEL: CPT

## 2024-10-21 ENCOUNTER — APPOINTMENT (OUTPATIENT)
Dept: PRIMARY CARE | Facility: CLINIC | Age: 74
End: 2024-10-21
Payer: MEDICARE

## 2024-10-21 VITALS
BODY MASS INDEX: 25.77 KG/M2 | WEIGHT: 180 LBS | OXYGEN SATURATION: 98 % | HEIGHT: 70 IN | HEART RATE: 62 BPM | SYSTOLIC BLOOD PRESSURE: 124 MMHG | DIASTOLIC BLOOD PRESSURE: 72 MMHG

## 2024-10-21 DIAGNOSIS — F90.9 ATTENTION DEFICIT HYPERACTIVITY DISORDER (ADHD), UNSPECIFIED ADHD TYPE: Primary | ICD-10-CM

## 2024-10-21 DIAGNOSIS — F41.1 GENERALIZED ANXIETY DISORDER: ICD-10-CM

## 2024-10-21 DIAGNOSIS — M79.604 LEG PAIN, BILATERAL: ICD-10-CM

## 2024-10-21 DIAGNOSIS — M79.605 LEG PAIN, BILATERAL: ICD-10-CM

## 2024-10-21 PROCEDURE — 99213 OFFICE O/P EST LOW 20 MIN: CPT | Performed by: INTERNAL MEDICINE

## 2024-10-21 PROCEDURE — 3074F SYST BP LT 130 MM HG: CPT | Performed by: INTERNAL MEDICINE

## 2024-10-21 PROCEDURE — 3078F DIAST BP <80 MM HG: CPT | Performed by: INTERNAL MEDICINE

## 2024-10-21 PROCEDURE — 1159F MED LIST DOCD IN RCRD: CPT | Performed by: INTERNAL MEDICINE

## 2024-10-21 RX ORDER — DEXTROAMPHETAMINE SACCHARATE, AMPHETAMINE ASPARTATE MONOHYDRATE, DEXTROAMPHETAMINE SULFATE AND AMPHETAMINE SULFATE 2.5; 2.5; 2.5; 2.5 MG/1; MG/1; MG/1; MG/1
20 CAPSULE, EXTENDED RELEASE ORAL EVERY MORNING
Qty: 60 CAPSULE | Refills: 0 | Status: SHIPPED | OUTPATIENT
Start: 2024-10-21 | End: 2024-11-20

## 2024-10-21 RX ORDER — CLONAZEPAM 0.5 MG/1
0.5 TABLET ORAL EVERY 12 HOURS PRN
Qty: 30 TABLET | Refills: 0 | Status: SHIPPED | OUTPATIENT
Start: 2024-10-21

## 2024-10-21 NOTE — PROGRESS NOTES
"Subjective   Patient ID: Paul Prather is a 74 y.o. male who presents for Follow-up.    HPI fu pat, add, ashd, hyperlipidemia, nilsa  Cardio is ok w/ pt on add meds  Cath done/reviewed  No pain.    Review of Systems  As above  Meds helping  Mild bilateral leg pain, swelling  Objective   /72   Pulse 62   Ht 1.778 m (5' 10\")   Wt 81.6 kg (180 lb)   SpO2 98%   BMI 25.83 kg/m²     Physical Exam  GEN nad, Affect wnl  Heent ncat, eomfg, face symmetric  Skin good color  Resp breathing easily  No p/m retardation or agitation  Thinking appropriate  Oriented  Legs look ok    Assessment/Plan   Diagnoses and all orders for this visit:  Attention deficit hyperactivity disorder (ADHD), unspecified ADHD type  -     amphetamine-dextroamphetamine XR (Adderall XR) 10 mg 24 hr capsule; Take 2 capsules (20 mg) by mouth once daily in the morning. Do not crush or chew.  Generalized anxiety disorder  -     clonazePAM (KlonoPIN) 0.5 mg tablet; Take 1 tablet (0.5 mg) by mouth every 12 hours if needed for anxiety.  Leg pain, bilateral  -     Vascular US lower extremity venous duplex bilateral; Future       "

## 2024-10-31 ENCOUNTER — APPOINTMENT (OUTPATIENT)
Dept: VASCULAR MEDICINE | Facility: CLINIC | Age: 74
End: 2024-10-31
Payer: MEDICARE

## 2024-11-06 ENCOUNTER — HOSPITAL ENCOUNTER (OUTPATIENT)
Dept: VASCULAR MEDICINE | Facility: CLINIC | Age: 74
Discharge: HOME | End: 2024-11-06
Payer: MEDICARE

## 2024-11-06 DIAGNOSIS — R60.0 LOCALIZED EDEMA: ICD-10-CM

## 2024-11-06 DIAGNOSIS — M79.605 LEG PAIN, BILATERAL: ICD-10-CM

## 2024-11-06 DIAGNOSIS — M79.604 LEG PAIN, BILATERAL: ICD-10-CM

## 2024-11-06 DIAGNOSIS — F90.9 ATTENTION DEFICIT HYPERACTIVITY DISORDER (ADHD), UNSPECIFIED ADHD TYPE: ICD-10-CM

## 2024-11-06 PROCEDURE — 93970 EXTREMITY STUDY: CPT | Performed by: SURGERY

## 2024-11-06 PROCEDURE — 93970 EXTREMITY STUDY: CPT

## 2024-11-06 RX ORDER — DEXTROAMPHETAMINE SACCHARATE, AMPHETAMINE ASPARTATE MONOHYDRATE, DEXTROAMPHETAMINE SULFATE AND AMPHETAMINE SULFATE 5; 5; 5; 5 MG/1; MG/1; MG/1; MG/1
20 CAPSULE, EXTENDED RELEASE ORAL EVERY MORNING
Qty: 30 CAPSULE | Refills: 0 | Status: SHIPPED | OUTPATIENT
Start: 2024-11-06

## 2024-11-06 RX ORDER — DEXTROAMPHETAMINE SACCHARATE, AMPHETAMINE ASPARTATE MONOHYDRATE, DEXTROAMPHETAMINE SULFATE AND AMPHETAMINE SULFATE 5; 5; 5; 5 MG/1; MG/1; MG/1; MG/1
20 CAPSULE, EXTENDED RELEASE ORAL EVERY MORNING
COMMUNITY
End: 2024-11-06 | Stop reason: SDUPTHER

## 2024-11-26 ENCOUNTER — APPOINTMENT (OUTPATIENT)
Dept: OPHTHALMOLOGY | Facility: CLINIC | Age: 74
End: 2024-11-26
Payer: MEDICARE

## 2024-11-26 DIAGNOSIS — H40.1131 PRIMARY OPEN ANGLE GLAUCOMA (POAG) OF BOTH EYES, MILD STAGE: Primary | ICD-10-CM

## 2024-11-26 DIAGNOSIS — H25.813 COMBINED FORMS OF AGE-RELATED CATARACT OF BOTH EYES: ICD-10-CM

## 2024-11-26 DIAGNOSIS — H53.8 BLURRY VISION: ICD-10-CM

## 2024-11-26 DIAGNOSIS — H52.13 MYOPIA, BILATERAL: ICD-10-CM

## 2024-11-26 DIAGNOSIS — H52.213 IRREGULAR ASTIGMATISM, BILATERAL: ICD-10-CM

## 2024-11-26 PROCEDURE — 99204 OFFICE O/P NEW MOD 45 MIN: CPT | Performed by: OPHTHALMOLOGY

## 2024-11-26 PROCEDURE — 76514 ECHO EXAM OF EYE THICKNESS: CPT | Performed by: OPHTHALMOLOGY

## 2024-11-26 PROCEDURE — 92133 CPTRZD OPH DX IMG PST SGM ON: CPT | Performed by: OPHTHALMOLOGY

## 2024-11-26 RX ORDER — LATANOPROST 50 UG/ML
1 SOLUTION/ DROPS OPHTHALMIC DAILY
Qty: 2.5 ML | Status: CANCELLED | OUTPATIENT
Start: 2024-11-26

## 2024-11-26 RX ORDER — LATANOPROST 50 UG/ML
1 SOLUTION/ DROPS OPHTHALMIC NIGHTLY
Qty: 2.5 ML | Refills: 11 | Status: SHIPPED | OUTPATIENT
Start: 2024-11-26 | End: 2025-11-26

## 2024-11-26 ASSESSMENT — EXTERNAL EXAM - RIGHT EYE: OD_EXAM: NORMAL

## 2024-11-26 ASSESSMENT — TONOMETRY
OD_IOP_MMHG: 8
IOP_METHOD: GOLDMANN APPLANATION
OS_IOP_MMHG: 9

## 2024-11-26 ASSESSMENT — ENCOUNTER SYMPTOMS
RESPIRATORY NEGATIVE: 0
NEUROLOGICAL NEGATIVE: 0
HEMATOLOGIC/LYMPHATIC NEGATIVE: 0
EYES NEGATIVE: 1
ALLERGIC/IMMUNOLOGIC NEGATIVE: 0
PSYCHIATRIC NEGATIVE: 0
CARDIOVASCULAR NEGATIVE: 0
ENDOCRINE NEGATIVE: 0
GASTROINTESTINAL NEGATIVE: 0
MUSCULOSKELETAL NEGATIVE: 0
CONSTITUTIONAL NEGATIVE: 0

## 2024-11-26 ASSESSMENT — REFRACTION_WEARINGRX
OD_CYLINDER: SPHERE
OS_AXIS: 004
OS_ADD: +2.75
OS_CYLINDER: -0.50
OD_SPHERE: -6.00
OD_ADD: +2.75
OS_SPHERE: -6.00

## 2024-11-26 ASSESSMENT — VISUAL ACUITY
CORRECTION_TYPE: GLASSES
OS_CC: 20/25
OD_CC: 20/20
OD_CC+: -1
METHOD: SNELLEN - LINEAR

## 2024-11-26 ASSESSMENT — CONF VISUAL FIELD
OD_INFERIOR_NASAL_RESTRICTION: 0
OS_INFERIOR_NASAL_RESTRICTION: 0
OD_SUPERIOR_NASAL_RESTRICTION: 0
OS_NORMAL: 1
OS_SUPERIOR_NASAL_RESTRICTION: 0
OD_INFERIOR_TEMPORAL_RESTRICTION: 0
OS_SUPERIOR_TEMPORAL_RESTRICTION: 0
OD_NORMAL: 1
OD_SUPERIOR_TEMPORAL_RESTRICTION: 0
OS_INFERIOR_TEMPORAL_RESTRICTION: 0

## 2024-11-26 ASSESSMENT — REFRACTION_MANIFEST
OD_SPHERE: -6.50
OD_ADD: +2.75
OS_CYLINDER: -0.25
OS_SPHERE: -6.25
OD_AXIS: 090
OD_CYLINDER: -0.50
OS_AXIS: 135
OS_ADD: +2.75

## 2024-11-26 ASSESSMENT — EXTERNAL EXAM - LEFT EYE: OS_EXAM: NORMAL

## 2024-11-26 ASSESSMENT — PACHYMETRY
OS_CT(UM): 565
OD_CT(UM): 554

## 2024-11-26 ASSESSMENT — CUP TO DISC RATIO
OS_RATIO: 0.5
OD_RATIO: 0.5

## 2024-11-26 ASSESSMENT — SLIT LAMP EXAM - LIDS
COMMENTS: NORMAL
COMMENTS: NORMAL

## 2024-11-26 NOTE — PROGRESS NOTES
First visit  Prior visit at [facility] - Dr. Mcadams (notes in epic)  Transferring care here    Primary open angle glaucoma (POAG) OU  Status post (s/p) SLT and currently on latanoprost  Today  Intraocular pressure (IOP): 8/9  CDR: 0.5/0.5  CCT: 554/565  OCT:   OD: WNL  OS: thin  GCA:  OD: WNL  OS: thin  A; poag under good control, intraocular pressure (IOP) at target, ct latanoprost    2. 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 Myopia  New mrx

## 2024-12-02 DIAGNOSIS — F41.1 GENERALIZED ANXIETY DISORDER: ICD-10-CM

## 2024-12-04 RX ORDER — CLONAZEPAM 0.5 MG/1
0.5 TABLET ORAL EVERY 12 HOURS PRN
Qty: 30 TABLET | Refills: 0 | Status: SHIPPED | OUTPATIENT
Start: 2024-12-04

## 2024-12-09 DIAGNOSIS — F90.9 ATTENTION DEFICIT HYPERACTIVITY DISORDER (ADHD), UNSPECIFIED ADHD TYPE: ICD-10-CM

## 2024-12-12 RX ORDER — DEXTROAMPHETAMINE SACCHARATE, AMPHETAMINE ASPARTATE MONOHYDRATE, DEXTROAMPHETAMINE SULFATE AND AMPHETAMINE SULFATE 2.5; 2.5; 2.5; 2.5 MG/1; MG/1; MG/1; MG/1
20 CAPSULE, EXTENDED RELEASE ORAL EVERY MORNING
Qty: 60 CAPSULE | Refills: 0 | Status: SHIPPED | OUTPATIENT
Start: 2024-12-12 | End: 2025-01-11

## 2024-12-30 DIAGNOSIS — F41.1 GENERALIZED ANXIETY DISORDER: ICD-10-CM

## 2024-12-30 RX ORDER — CLONAZEPAM 0.5 MG/1
0.5 TABLET ORAL EVERY 12 HOURS PRN
Qty: 30 TABLET | Refills: 0 | Status: SHIPPED | OUTPATIENT
Start: 2024-12-30

## 2025-01-10 ENCOUNTER — APPOINTMENT (OUTPATIENT)
Dept: NEPHROLOGY | Facility: CLINIC | Age: 75
End: 2025-01-10
Payer: MEDICARE

## 2025-01-10 VITALS
WEIGHT: 182.4 LBS | HEIGHT: 70 IN | TEMPERATURE: 97.3 F | SYSTOLIC BLOOD PRESSURE: 93 MMHG | BODY MASS INDEX: 26.11 KG/M2 | HEART RATE: 63 BPM | DIASTOLIC BLOOD PRESSURE: 60 MMHG | OXYGEN SATURATION: 96 %

## 2025-01-10 DIAGNOSIS — Z79.1 NSAID LONG-TERM USE: ICD-10-CM

## 2025-01-10 DIAGNOSIS — N18.30 STAGE 3 CHRONIC KIDNEY DISEASE, UNSPECIFIED WHETHER STAGE 3A OR 3B CKD (MULTI): Primary | ICD-10-CM

## 2025-01-10 DIAGNOSIS — I10 HTN (HYPERTENSION), BENIGN: ICD-10-CM

## 2025-01-10 PROCEDURE — 99213 OFFICE O/P EST LOW 20 MIN: CPT | Performed by: NURSE PRACTITIONER

## 2025-01-10 PROCEDURE — 3078F DIAST BP <80 MM HG: CPT | Performed by: NURSE PRACTITIONER

## 2025-01-10 PROCEDURE — 1159F MED LIST DOCD IN RCRD: CPT | Performed by: NURSE PRACTITIONER

## 2025-01-10 PROCEDURE — 1036F TOBACCO NON-USER: CPT | Performed by: NURSE PRACTITIONER

## 2025-01-10 PROCEDURE — 1126F AMNT PAIN NOTED NONE PRSNT: CPT | Performed by: NURSE PRACTITIONER

## 2025-01-10 PROCEDURE — 3074F SYST BP LT 130 MM HG: CPT | Performed by: NURSE PRACTITIONER

## 2025-01-10 ASSESSMENT — ENCOUNTER SYMPTOMS
CARDIOVASCULAR NEGATIVE: 1
CONSTITUTIONAL NEGATIVE: 1
ENDOCRINE NEGATIVE: 1
HEMATOLOGIC/LYMPHATIC NEGATIVE: 1
BACK PAIN: 1
GASTROINTESTINAL NEGATIVE: 1
RESPIRATORY NEGATIVE: 1
PSYCHIATRIC NEGATIVE: 1
NEUROLOGICAL NEGATIVE: 1

## 2025-01-10 ASSESSMENT — PAIN SCALES - GENERAL: PAINLEVEL_OUTOF10: 0-NO PAIN

## 2025-01-10 NOTE — PROGRESS NOTES
History Of Present Illness  Paul Prather is a 74 y.o. male with medical history significant for CKD3, HTN, ADHD, chronic back pain, and anxiety who presents for a 6-month fuv for CKD.     Past Medical History  As above.    Surgical History  He has a past surgical history that includes Cardiac catheterization (N/A, 8/26/2024).     Social History  He reports that he has quit smoking. His smoking use included cigarettes. He has never used smokeless tobacco. He reports current alcohol use. He reports that he does not use drugs.    Family History  Family History   Problem Relation Name Age of Onset    Other (arteriosclerotic vascular disease) Father          Allergies  Other and Sulfa (sulfonamide antibiotics)    Review of Systems   Constitutional: Negative.    HENT: Negative.     Respiratory: Negative.     Cardiovascular: Negative.    Gastrointestinal: Negative.    Endocrine: Negative.    Genitourinary: Negative.    Musculoskeletal:  Positive for back pain.   Skin: Negative.    Neurological: Negative.    Hematological: Negative.    Psychiatric/Behavioral: Negative.          Physical Exam  Constitutional:       Appearance: Normal appearance.   HENT:      Head: Normocephalic and atraumatic.      Mouth/Throat:      Mouth: Mucous membranes are moist.   Cardiovascular:      Rate and Rhythm: Normal rate and regular rhythm.      Pulses: Normal pulses.      Heart sounds: Normal heart sounds.   Pulmonary:      Effort: Pulmonary effort is normal.      Breath sounds: Normal breath sounds.   Musculoskeletal:         General: Normal range of motion.   Skin:     General: Skin is warm and dry.   Neurological:      General: No focal deficit present.      Mental Status: He is alert and oriented to person, place, and time.   Psychiatric:         Mood and Affect: Mood normal.         Behavior: Behavior normal.         Thought Content: Thought content normal.         Judgment: Judgment normal.          Last Recorded Vitals  Blood pressure  "93/60, pulse 63, temperature 36.3 °C (97.3 °F), temperature source Temporal, height 1.778 m (5' 10\"), weight 82.7 kg (182 lb 6.4 oz), SpO2 96%.    Relevant Results  Recent Results (from the past 15 weeks)   Lipid Panel    Collection Time: 10/16/24 12:06 PM   Result Value Ref Range    Cholesterol 111 0 - 199 mg/dL    HDL-Cholesterol 52.2 mg/dL    Cholesterol/HDL Ratio 2.1     LDL Calculated 38 <=99 mg/dL    VLDL 21 0 - 40 mg/dL    Triglycerides 105 0 - 149 mg/dL    Non HDL Cholesterol 59 0 - 149 mg/dL   TSH with reflex to Free T4 if abnormal    Collection Time: 10/16/24 12:06 PM   Result Value Ref Range    Thyroid Stimulating Hormone 2.06 0.44 - 3.98 mIU/L   Comprehensive metabolic panel    Collection Time: 10/16/24 12:06 PM   Result Value Ref Range    Glucose 113 (H) 74 - 99 mg/dL    Sodium 142 136 - 145 mmol/L    Potassium 4.5 3.5 - 5.3 mmol/L    Chloride 104 98 - 107 mmol/L    Bicarbonate 30 21 - 32 mmol/L    Anion Gap 13 10 - 20 mmol/L    Urea Nitrogen 32 (H) 6 - 23 mg/dL    Creatinine 1.45 (H) 0.50 - 1.30 mg/dL    eGFR 50 (L) >60 mL/min/1.73m*2    Calcium 9.3 8.6 - 10.6 mg/dL    Albumin 4.2 3.4 - 5.0 g/dL    Alkaline Phosphatase 106 33 - 136 U/L    Total Protein 6.4 6.4 - 8.2 g/dL    AST 23 9 - 39 U/L    Bilirubin, Total 0.4 0.0 - 1.2 mg/dL    ALT 26 10 - 52 U/L            Assessment/Plan     74 y.o. male with medical history significant for CKD3, HTN, ADHD, chronic back pain, and anxiety who presents for a 6-month fuv for CKD.     # CKD3: baseline sCr 1.5 - 2.0 mg/dL with eGFR 35 - 50 ml/min/1.73m2 since at least 2016. Per note from the [facility], \"renal biopsy showed 10-15% tubular atrophy/interstitial fibrosis with some glomerulosclerosis\". Renal US done on 11/14/22 showed left renal nonobstructive calculus and small cyst otherwise unremarkable. Likely due to hypertensive kidney disease and NSAIDs-associated kidney disease. Most recent sCr 1.45 mg/dL with eGFR 50 ml/min/1.73m2 (10/16/24) - at " baseline. Currently stable.    # HTN: BP was soft in clinic today. Asymptomatic. No regular home BP checks.    # Long-term NSAIDs use: no longer use.    Plan:  - Continue to follow up with PCP for optimal HTN management. Goal /80 mmHg for renal protection.   - No changes with current medications.  - Reviewed strategies for preserving remaining kidney function includin) Avoidance of NSAIDs including Aleve (Naprosyn), Motrin (Ibuprofen), Mobic (Meloxicam), Celebrex (Celecoxib) and aspirin as well as PPI acid blocking medications such as Prilosec (omeprazole), Protonix (pantoprazole), and Nexium (esomeprazole), as well as other nephrotoxic agents.   2) Avoidance of tobacco or alcohol use.   3) Adequate hydration daily.   4) Blood pressure target 130/80 mmHg.   5) Daily dietary sodium intake less than 2 grams per day.    6) Maintain healthy lifestyle. Healthy diet and regular exercises.   7) Maintain ideal weight.  - FUV: in 6 months or sooner if concerns arise.     Patient verbalized understanding of above. He will not hesitate to contact Division of Nephrology at [phone] with concerns/questions.    I spent 25 minutes in the professional and overall care of this patient.      Dilcia Barnard, APRN-CNP

## 2025-01-21 ENCOUNTER — APPOINTMENT (OUTPATIENT)
Dept: PRIMARY CARE | Facility: CLINIC | Age: 75
End: 2025-01-21
Payer: MEDICARE

## 2025-01-21 VITALS
OXYGEN SATURATION: 100 % | HEIGHT: 70 IN | WEIGHT: 182 LBS | DIASTOLIC BLOOD PRESSURE: 70 MMHG | SYSTOLIC BLOOD PRESSURE: 116 MMHG | BODY MASS INDEX: 26.05 KG/M2 | HEART RATE: 60 BPM

## 2025-01-21 DIAGNOSIS — F41.1 GENERALIZED ANXIETY DISORDER: ICD-10-CM

## 2025-01-21 DIAGNOSIS — I10 HTN (HYPERTENSION), BENIGN: ICD-10-CM

## 2025-01-21 DIAGNOSIS — F90.9 ATTENTION DEFICIT HYPERACTIVITY DISORDER (ADHD), UNSPECIFIED ADHD TYPE: ICD-10-CM

## 2025-01-21 DIAGNOSIS — J30.1 ALLERGIC RHINITIS DUE TO POLLEN, UNSPECIFIED SEASONALITY: ICD-10-CM

## 2025-01-21 DIAGNOSIS — I25.10 ASHD (ARTERIOSCLEROTIC HEART DISEASE): Primary | ICD-10-CM

## 2025-01-21 PROCEDURE — 1159F MED LIST DOCD IN RCRD: CPT | Performed by: INTERNAL MEDICINE

## 2025-01-21 PROCEDURE — 3078F DIAST BP <80 MM HG: CPT | Performed by: INTERNAL MEDICINE

## 2025-01-21 PROCEDURE — G0439 PPPS, SUBSEQ VISIT: HCPCS | Performed by: INTERNAL MEDICINE

## 2025-01-21 PROCEDURE — 3074F SYST BP LT 130 MM HG: CPT | Performed by: INTERNAL MEDICINE

## 2025-01-21 PROCEDURE — 99214 OFFICE O/P EST MOD 30 MIN: CPT | Performed by: INTERNAL MEDICINE

## 2025-01-21 RX ORDER — CLONAZEPAM 0.5 MG/1
0.5 TABLET ORAL EVERY 12 HOURS PRN
Qty: 30 TABLET | Refills: 0 | Status: SHIPPED | OUTPATIENT
Start: 2025-01-21

## 2025-01-21 RX ORDER — LISINOPRIL 10 MG/1
10 TABLET ORAL DAILY
Qty: 100 TABLET | Refills: 3 | Status: SHIPPED | OUTPATIENT
Start: 2025-01-21 | End: 2026-02-25

## 2025-01-21 RX ORDER — CETIRIZINE HYDROCHLORIDE 10 MG/1
10 TABLET ORAL DAILY
Qty: 30 TABLET | Refills: 2 | Status: SHIPPED | OUTPATIENT
Start: 2025-01-21 | End: 2025-04-21

## 2025-01-21 RX ORDER — TRAZODONE HYDROCHLORIDE 150 MG/1
150 TABLET ORAL NIGHTLY
Qty: 90 TABLET | Refills: 3 | Status: SHIPPED | OUTPATIENT
Start: 2025-01-21

## 2025-01-21 RX ORDER — DEXTROAMPHETAMINE SACCHARATE, AMPHETAMINE ASPARTATE MONOHYDRATE, DEXTROAMPHETAMINE SULFATE AND AMPHETAMINE SULFATE 5; 5; 5; 5 MG/1; MG/1; MG/1; MG/1
20 CAPSULE, EXTENDED RELEASE ORAL EVERY MORNING
Qty: 30 CAPSULE | Refills: 0 | Status: SHIPPED | OUTPATIENT
Start: 2025-01-21 | End: 2025-02-20

## 2025-01-21 NOTE — PROGRESS NOTES
"Subjective   Patient ID: Paul Prather is a 74 y.o. male who presents for Follow-up.    HPI fu nilsa, add, dep, svt, ar, ckd, htn  Feels well overall   Meds working w/o side effects    Review of Systems  As above  No cardiac sxs    Objective   /70   Pulse 60   Ht 1.778 m (5' 10\")   Wt 82.6 kg (182 lb)   SpO2 100%   BMI 26.11 kg/m²     Physical Exam  GEN nad, Affect wnl  Heent ncat, eomfg, face symmetric  Skin good color  Resp breathing easily  No p/m retardation or agitation  Thinking appropriate  Oriented  Labs reviewed, oarrs    Assessment/Plan   Diagnoses and all orders for this visit:  ASHD (arteriosclerotic heart disease)  -     Comprehensive metabolic panel; Future  -     Lipid Panel; Future  -     CBC and Auto Differential; Future  Generalized anxiety disorder  -     traZODone (Desyrel) 150 mg tablet; Take 1 tablet (150 mg) by mouth once daily at bedtime.  -     clonazePAM (KlonoPIN) 0.5 mg tablet; Take 1 tablet (0.5 mg) by mouth every 12 hours if needed for anxiety.  -     amphetamine-dextroamphetamine XR (Adderall XR) 20 mg 24 hr capsule; Take 1 capsule (20 mg) by mouth once daily in the morning. Do not crush or chew.  Attention deficit hyperactivity disorder (ADHD), unspecified ADHD type  HTN (hypertension), benign  -     lisinopril 10 mg tablet; Take 1 tablet (10 mg) by mouth once daily.  Allergic rhinitis due to pollen, unspecified seasonality  -     cetirizine (ZyrTEC) 10 mg tablet; Take 1 tablet (10 mg) by mouth once daily.       "

## 2025-02-10 DIAGNOSIS — I47.10 SVT (SUPRAVENTRICULAR TACHYCARDIA) (CMS-HCC): ICD-10-CM

## 2025-02-10 DIAGNOSIS — I47.9 TACHYCARDIA, PAROXYSMAL (MULTI): ICD-10-CM

## 2025-02-11 ENCOUNTER — APPOINTMENT (OUTPATIENT)
Dept: ORTHOPEDIC SURGERY | Facility: CLINIC | Age: 75
End: 2025-02-11
Payer: MEDICARE

## 2025-02-11 RX ORDER — METOPROLOL SUCCINATE 25 MG/1
25 TABLET, EXTENDED RELEASE ORAL DAILY
Qty: 90 TABLET | Refills: 3 | Status: SHIPPED | OUTPATIENT
Start: 2025-02-11

## 2025-02-14 ENCOUNTER — HOSPITAL ENCOUNTER (OUTPATIENT)
Dept: RADIOLOGY | Facility: EXTERNAL LOCATION | Age: 75
Discharge: HOME | End: 2025-02-14

## 2025-02-14 ENCOUNTER — OFFICE VISIT (OUTPATIENT)
Dept: ORTHOPEDIC SURGERY | Facility: CLINIC | Age: 75
End: 2025-02-14
Payer: MEDICARE

## 2025-02-14 DIAGNOSIS — G89.29 CHRONIC PAIN OF BOTH KNEES: ICD-10-CM

## 2025-02-14 DIAGNOSIS — M25.561 CHRONIC PAIN OF BOTH KNEES: ICD-10-CM

## 2025-02-14 DIAGNOSIS — M17.0 ARTHRITIS OF BOTH KNEES: Primary | ICD-10-CM

## 2025-02-14 DIAGNOSIS — M25.562 CHRONIC PAIN OF BOTH KNEES: ICD-10-CM

## 2025-02-14 PROCEDURE — 1160F RVW MEDS BY RX/DR IN RCRD: CPT | Performed by: FAMILY MEDICINE

## 2025-02-14 PROCEDURE — 99213 OFFICE O/P EST LOW 20 MIN: CPT | Performed by: FAMILY MEDICINE

## 2025-02-14 PROCEDURE — 1036F TOBACCO NON-USER: CPT | Performed by: FAMILY MEDICINE

## 2025-02-14 PROCEDURE — 1159F MED LIST DOCD IN RCRD: CPT | Performed by: FAMILY MEDICINE

## 2025-02-14 PROCEDURE — 20611 DRAIN/INJ JOINT/BURSA W/US: CPT | Performed by: FAMILY MEDICINE

## 2025-02-14 RX ORDER — TRIAMCINOLONE ACETONIDE 40 MG/ML
40 INJECTION, SUSPENSION INTRA-ARTICULAR; INTRAMUSCULAR
Status: COMPLETED | OUTPATIENT
Start: 2025-02-14 | End: 2025-02-14

## 2025-02-14 RX ORDER — LIDOCAINE HYDROCHLORIDE 20 MG/ML
2 INJECTION, SOLUTION INFILTRATION; PERINEURAL
Status: COMPLETED | OUTPATIENT
Start: 2025-02-14 | End: 2025-02-14

## 2025-02-14 RX ADMIN — LIDOCAINE HYDROCHLORIDE 2 ML: 20 INJECTION, SOLUTION INFILTRATION; PERINEURAL at 12:29

## 2025-02-14 RX ADMIN — TRIAMCINOLONE ACETONIDE 40 MG: 40 INJECTION, SUSPENSION INTRA-ARTICULAR; INTRAMUSCULAR at 12:29

## 2025-02-14 NOTE — PROGRESS NOTES
History of Present Illness   Chief Complaint   Patient presents with    Left Knee - Pain    Right Knee - Pain       The patient is 74 y.o. male  here with a complaint of bilateral knee pain.  Patient has known osteoarthritis of bilateral knees, left has been worse than the right, he did have knee arthroscopy for degenerative medial meniscus tear in 2023 with Dr. Preciado.  He has been followed with Dr. LoPresti more recently for his knee pain with as needed corticosteroid injections, last injections in bilateral knees were approximately 6 months ago with good relief with more recent recurrence of pain.  He did have a fall around a week ago with some slightly worsening right knee pain.  He denies any swelling, no bruising of either knee.  He denies any locking or catching or instability.  He has been increasing his physical activity secondary to diagnosis of some coronary artery disease.    Past Medical History:   Diagnosis Date    Abnormal stress electrocardiogram test 08/07/2024    Arthritis of knee, left 07/25/2023    Coronary artery disease involving native coronary artery of native heart without angina pectoris 07/24/2024    Elevated coronary artery calcium score 07/24/2024    Other specified abnormal findings of blood chemistry     Elevated d-dimer    Other specified abnormal findings of blood chemistry 11/15/2022    D-dimer, elevated    SVT (supraventricular tachycardia) (CMS-HCC) 04/13/2023    Tear of medial meniscus of left knee, current 07/25/2023       Medication Documentation Review Audit       Reviewed by Carla Mcgill MA (Medical Assistant) on 02/14/25 at 1025      Medication Order Taking? Sig Documenting Provider Last Dose Status   amphetamine-dextroamphetamine XR (Adderall XR) 20 mg 24 hr capsule [id number]  Take 1 capsule (20 mg) by mouth once daily in the morning. Do not crush or chew. Aayush Lopez MD  Active   atorvastatin (Lipitor) 40 mg tablet [id number] No TAKE 1 TABLET BY MOUTH ONCE DAILY  James C Ramicone, DO 8/26/2024 Active   cetirizine (ZyrTEC) 10 mg tablet [id number]  Take 1 tablet (10 mg) by mouth once daily. Aayush Lopez MD  Active   clonazePAM (KlonoPIN) 0.5 mg tablet [id number]  Take 1 tablet (0.5 mg) by mouth every 12 hours if needed for anxiety. Aayush Lopez MD  Active   latanoprost (Xalatan) 0.005 % ophthalmic solution [id number]  Administer 1 drop into both eyes once daily at bedtime. Yefri Dumont MD  Active   lisinopril 10 mg tablet [id number]  Take 1 tablet (10 mg) by mouth once daily. Aayush Lopez MD  Active   Discontinued 02/11/25 0835   metoprolol succinate XL (Toprol-XL) 25 mg 24 hr tablet [id number]  Take 1 tablet (25 mg) by mouth once daily. James C Ramicone, DO  Active   multivit-minerals/folic acid (CENTRUM ADULTS ORAL) [id number] No  Historical Provider, MD 8/26/2024 Active   traZODone (Desyrel) 150 mg tablet [id number]  Take 1 tablet (150 mg) by mouth once daily at bedtime. Aayush Lopez MD  Active   venlafaxine XR (Effexor-XR) 150 mg 24 hr capsule [id number] No Take 1 capsule (150 mg) by mouth once daily. Aayush Lopez MD 8/26/2024 Active                    Allergies   Allergen Reactions    Other Runny nose     SEASONAL allergies    Sulfa (Sulfonamide Antibiotics) Rash       Social History     Socioeconomic History    Marital status:      Spouse name: Not on file    Number of children: Not on file    Years of education: Not on file    Highest education level: Not on file   Occupational History    Not on file   Tobacco Use    Smoking status: Former     Types: Cigarettes    Smokeless tobacco: Never   Substance and Sexual Activity    Alcohol use: Yes     Comment: occassional    Drug use: Never    Sexual activity: Not on file   Other Topics Concern    Not on file   Social History Narrative    Not on file     Social Drivers of Health     Financial Resource Strain: Not on file   Food Insecurity: Not on file   Transportation Needs: Not on file   Physical Activity: Not  on file   Stress: Not on file   Social Connections: Not on file   Intimate Partner Violence: Not on file   Housing Stability: Not on file       Past Surgical History:   Procedure Laterality Date    CARDIAC CATHETERIZATION N/A 8/26/2024    Procedure: Left Heart Cath;  Surgeon: Fortino Starr MD;  Location: [facility];  Service: Cardiovascular;  Laterality: N/A;  [facility] 08.26.2024 @1030          Review of Systems   GENERAL: Negative  GI: Negative  MUSCULOSKELETAL: See HPI  SKIN: Negative  NEURO:  Negative     Physical Exam:    General/Constitutional: well appearing, no distress, appears stated age  HEENT: sclera clear  Respiratory: non labored breathing  Vascular: No edema, swelling or tenderness, except as noted in detailed exam.  Integumentary: No impressive skin lesions present, except as noted in detailed exam.  Neurological:  Alert and oriented   Psychological:  Normal mood and affect.  Musculoskeletal: Normal, except as noted in detailed exam and in HPI.  Normal gait, unassisted    Bilateral knees are normal in appearance, there is no swelling, no skin changes.  No joint effusion bilaterally.  He has some mild medial joint tenderness at both knees.  No other areas of significant tenderness to palpation.  Range of motion from 0 to 120 degrees of flexion bilaterally, there is pain at endrange of flexion in both knees.  No motor deficits present in either knee, no gross ligamentous laxity bilaterally.       Imaging: No new imaging today, previous x-ray showed some mild degenerative changes medially.    L Inj/Asp: bilateral knee on 2/14/2025 12:29 PM  Indications: pain  Details: 22 G needle, ultrasound-guided superolateral approach  Medications (Right): 40 mg triamcinolone acetonide 40 mg/mL; 2 mL lidocaine 20 mg/mL (2 %)  Medications (Left): 40 mg triamcinolone acetonide 40 mg/mL; 2 mL lidocaine 20 mg/mL (2 %)  Outcome: tolerated well, no immediate complications    Bilateral knee joint and surrounding  structures were appropriately visualized before and during injection    Ultrasound images were permanently uploaded to the medical record/PACS  Procedure, treatment alternatives, risks and benefits explained, specific risks discussed. Consent was given by the patient. Immediately prior to procedure a time out was called to verify the correct patient, procedure, equipment, support staff and site/side marked as required. Patient was prepped and draped in the usual sterile fashion.               Assessment   1. Arthritis of both knees        2. Chronic pain of both knees  Point of Care Ultrasound            Plan: Discussed diagnosis, further workup and treatment.  We did proceed with repeat ultrasound-guided bilateral knee joint junctions with Kenalog today, patient tolerated without issue, see procedure note for full details.  He will continue with active lifestyle, low impact exercise as tolerated by symptoms.  He will follow-up as symptoms dictate.

## 2025-02-17 DIAGNOSIS — H40.1131 PRIMARY OPEN ANGLE GLAUCOMA (POAG) OF BOTH EYES, MILD STAGE: ICD-10-CM

## 2025-02-17 DIAGNOSIS — F41.1 GENERALIZED ANXIETY DISORDER: ICD-10-CM

## 2025-02-20 RX ORDER — CLONAZEPAM 0.5 MG/1
0.5 TABLET ORAL EVERY 12 HOURS PRN
Qty: 30 TABLET | Refills: 0 | Status: SHIPPED | OUTPATIENT
Start: 2025-02-20

## 2025-02-20 RX ORDER — DEXTROAMPHETAMINE SACCHARATE, AMPHETAMINE ASPARTATE MONOHYDRATE, DEXTROAMPHETAMINE SULFATE AND AMPHETAMINE SULFATE 5; 5; 5; 5 MG/1; MG/1; MG/1; MG/1
20 CAPSULE, EXTENDED RELEASE ORAL EVERY MORNING
Qty: 30 CAPSULE | Refills: 0 | Status: SHIPPED | OUTPATIENT
Start: 2025-02-20 | End: 2025-03-22

## 2025-03-03 ENCOUNTER — APPOINTMENT (OUTPATIENT)
Dept: CARDIOLOGY | Facility: CLINIC | Age: 75
End: 2025-03-03
Payer: MEDICARE

## 2025-03-17 ENCOUNTER — HOSPITAL ENCOUNTER (INPATIENT)
Facility: HOSPITAL | Age: 75
LOS: 1 days | Discharge: HOME | End: 2025-03-19
Attending: EMERGENCY MEDICINE | Admitting: STUDENT IN AN ORGANIZED HEALTH CARE EDUCATION/TRAINING PROGRAM
Payer: MEDICARE

## 2025-03-17 ENCOUNTER — APPOINTMENT (OUTPATIENT)
Dept: RADIOLOGY | Facility: HOSPITAL | Age: 75
End: 2025-03-17
Payer: MEDICARE

## 2025-03-17 ENCOUNTER — TELEPHONE (OUTPATIENT)
Dept: PRIMARY CARE | Facility: CLINIC | Age: 75
End: 2025-03-17

## 2025-03-17 DIAGNOSIS — C15.9 ESOPHAGEAL ADENOCARCINOMA (MULTI): Primary | ICD-10-CM

## 2025-03-17 DIAGNOSIS — R13.10 DYSPHAGIA, UNSPECIFIED TYPE: ICD-10-CM

## 2025-03-17 DIAGNOSIS — D64.9 ANEMIA, UNSPECIFIED TYPE: ICD-10-CM

## 2025-03-17 DIAGNOSIS — D64.9 ANEMIA, UNSPECIFIED TYPE: Primary | ICD-10-CM

## 2025-03-17 LAB
ABO GROUP (TYPE) IN BLOOD: NORMAL
ALBUMIN SERPL BCP-MCNC: 4.1 G/DL (ref 3.4–5)
ALP SERPL-CCNC: 96 U/L (ref 33–136)
ALT SERPL W P-5'-P-CCNC: 18 U/L (ref 10–52)
ANION GAP SERPL CALC-SCNC: 8 MMOL/L (ref 10–20)
ANTIBODY SCREEN: NORMAL
AST SERPL W P-5'-P-CCNC: 19 U/L (ref 9–39)
BASOPHILS # BLD AUTO: 0.01 X10*3/UL (ref 0–0.1)
BASOPHILS NFR BLD AUTO: 0.1 %
BILIRUB DIRECT SERPL-MCNC: 0 MG/DL (ref 0–0.3)
BILIRUB SERPL-MCNC: 0.3 MG/DL (ref 0–1.2)
BNP SERPL-MCNC: 56 PG/ML (ref 0–99)
BUN SERPL-MCNC: 36 MG/DL (ref 6–23)
CALCIUM SERPL-MCNC: 9 MG/DL (ref 8.6–10.3)
CHLORIDE SERPL-SCNC: 108 MMOL/L (ref 98–107)
CO2 SERPL-SCNC: 27 MMOL/L (ref 21–32)
CREAT SERPL-MCNC: 1.68 MG/DL (ref 0.5–1.3)
EGFRCR SERPLBLD CKD-EPI 2021: 42 ML/MIN/1.73M*2
EOSINOPHIL # BLD AUTO: 0.09 X10*3/UL (ref 0–0.4)
EOSINOPHIL NFR BLD AUTO: 1.2 %
ERYTHROCYTE [DISTWIDTH] IN BLOOD BY AUTOMATED COUNT: 16.3 % (ref 11.5–14.5)
FERRITIN SERPL-MCNC: 14 NG/ML (ref 20–300)
GLUCOSE SERPL-MCNC: 83 MG/DL (ref 74–99)
HCT VFR BLD AUTO: 29 % (ref 41–52)
HEMOCCULT SP1 STL QL: NEGATIVE
HGB BLD-MCNC: 8.6 G/DL (ref 13.5–17.5)
IMM GRANULOCYTES # BLD AUTO: 0.05 X10*3/UL (ref 0–0.5)
IMM GRANULOCYTES NFR BLD AUTO: 0.7 % (ref 0–0.9)
INR PPP: 1.1 (ref 0.9–1.1)
IRON SATN MFR SERPL: 9 % (ref 25–45)
IRON SERPL-MCNC: 36 UG/DL (ref 35–150)
LDH SERPL L TO P-CCNC: 163 U/L (ref 84–246)
LIPASE SERPL-CCNC: 51 U/L (ref 9–82)
LYMPHOCYTES # BLD AUTO: 1.22 X10*3/UL (ref 0.8–3)
LYMPHOCYTES NFR BLD AUTO: 16.2 %
MCH RBC QN AUTO: 24.4 PG (ref 26–34)
MCHC RBC AUTO-ENTMCNC: 29.7 G/DL (ref 32–36)
MCV RBC AUTO: 82 FL (ref 80–100)
MONOCYTES # BLD AUTO: 0.64 X10*3/UL (ref 0.05–0.8)
MONOCYTES NFR BLD AUTO: 8.5 %
NEUTROPHILS # BLD AUTO: 5.54 X10*3/UL (ref 1.6–5.5)
NEUTROPHILS NFR BLD AUTO: 73.3 %
NRBC BLD-RTO: 0 /100 WBCS (ref 0–0)
PLATELET # BLD AUTO: 259 X10*3/UL (ref 150–450)
POTASSIUM SERPL-SCNC: 4.6 MMOL/L (ref 3.5–5.3)
PROT SERPL-MCNC: 6.8 G/DL (ref 6.4–8.2)
PROTHROMBIN TIME: 11.9 SECONDS (ref 9.8–12.4)
RBC # BLD AUTO: 3.52 X10*6/UL (ref 4.5–5.9)
RH FACTOR (ANTIGEN D): NORMAL
SODIUM SERPL-SCNC: 138 MMOL/L (ref 136–145)
TIBC SERPL-MCNC: 399 UG/DL (ref 240–445)
UIBC SERPL-MCNC: 363 UG/DL (ref 110–370)
VIT B12 SERPL-MCNC: 399 PG/ML (ref 211–911)
WBC # BLD AUTO: 7.6 X10*3/UL (ref 4.4–11.3)

## 2025-03-17 PROCEDURE — 85610 PROTHROMBIN TIME: CPT | Performed by: PHYSICIAN ASSISTANT

## 2025-03-17 PROCEDURE — 36415 COLL VENOUS BLD VENIPUNCTURE: CPT | Performed by: PHYSICIAN ASSISTANT

## 2025-03-17 PROCEDURE — G0378 HOSPITAL OBSERVATION PER HR: HCPCS

## 2025-03-17 PROCEDURE — 83690 ASSAY OF LIPASE: CPT | Performed by: PHYSICIAN ASSISTANT

## 2025-03-17 PROCEDURE — 99285 EMERGENCY DEPT VISIT HI MDM: CPT | Mod: 25 | Performed by: EMERGENCY MEDICINE

## 2025-03-17 PROCEDURE — 80053 COMPREHEN METABOLIC PANEL: CPT | Performed by: PHYSICIAN ASSISTANT

## 2025-03-17 PROCEDURE — 2500000004 HC RX 250 GENERAL PHARMACY W/ HCPCS (ALT 636 FOR OP/ED): Performed by: INTERNAL MEDICINE

## 2025-03-17 PROCEDURE — 99233 SBSQ HOSP IP/OBS HIGH 50: CPT

## 2025-03-17 PROCEDURE — 82728 ASSAY OF FERRITIN: CPT | Performed by: EMERGENCY MEDICINE

## 2025-03-17 PROCEDURE — 82270 OCCULT BLOOD FECES: CPT | Performed by: EMERGENCY MEDICINE

## 2025-03-17 PROCEDURE — 70450 CT HEAD/BRAIN W/O DYE: CPT | Performed by: RADIOLOGY

## 2025-03-17 PROCEDURE — 82607 VITAMIN B-12: CPT | Mod: AHULAB | Performed by: INTERNAL MEDICINE

## 2025-03-17 PROCEDURE — 83615 LACTATE (LD) (LDH) ENZYME: CPT | Performed by: EMERGENCY MEDICINE

## 2025-03-17 PROCEDURE — 86900 BLOOD TYPING SEROLOGIC ABO: CPT | Performed by: PHYSICIAN ASSISTANT

## 2025-03-17 PROCEDURE — 85025 COMPLETE CBC W/AUTO DIFF WBC: CPT | Performed by: PHYSICIAN ASSISTANT

## 2025-03-17 PROCEDURE — 2500000001 HC RX 250 WO HCPCS SELF ADMINISTERED DRUGS (ALT 637 FOR MEDICARE OP): Performed by: NURSE PRACTITIONER

## 2025-03-17 PROCEDURE — 82746 ASSAY OF FOLIC ACID SERUM: CPT | Mod: AHULAB | Performed by: INTERNAL MEDICINE

## 2025-03-17 PROCEDURE — 70450 CT HEAD/BRAIN W/O DYE: CPT

## 2025-03-17 PROCEDURE — 83880 ASSAY OF NATRIURETIC PEPTIDE: CPT | Performed by: PHYSICIAN ASSISTANT

## 2025-03-17 PROCEDURE — 83540 ASSAY OF IRON: CPT | Performed by: EMERGENCY MEDICINE

## 2025-03-17 RX ORDER — ACETAMINOPHEN 650 MG/1
650 SUPPOSITORY RECTAL EVERY 4 HOURS PRN
Status: DISCONTINUED | OUTPATIENT
Start: 2025-03-17 | End: 2025-03-19 | Stop reason: HOSPADM

## 2025-03-17 RX ORDER — POLYETHYLENE GLYCOL 3350 17 G/17G
17 POWDER, FOR SOLUTION ORAL DAILY PRN
Status: DISCONTINUED | OUTPATIENT
Start: 2025-03-17 | End: 2025-03-19 | Stop reason: HOSPADM

## 2025-03-17 RX ORDER — METOPROLOL SUCCINATE 25 MG/1
25 TABLET, EXTENDED RELEASE ORAL DAILY
Status: DISCONTINUED | OUTPATIENT
Start: 2025-03-17 | End: 2025-03-19 | Stop reason: HOSPADM

## 2025-03-17 RX ORDER — VENLAFAXINE HYDROCHLORIDE 37.5 MG/1
150 CAPSULE, EXTENDED RELEASE ORAL DAILY
Status: DISCONTINUED | OUTPATIENT
Start: 2025-03-17 | End: 2025-03-18

## 2025-03-17 RX ORDER — ACETAMINOPHEN 160 MG/5ML
650 SOLUTION ORAL EVERY 4 HOURS PRN
Status: DISCONTINUED | OUTPATIENT
Start: 2025-03-17 | End: 2025-03-19 | Stop reason: HOSPADM

## 2025-03-17 RX ORDER — ONDANSETRON 4 MG/1
4 TABLET, FILM COATED ORAL EVERY 8 HOURS PRN
Status: DISCONTINUED | OUTPATIENT
Start: 2025-03-17 | End: 2025-03-19 | Stop reason: HOSPADM

## 2025-03-17 RX ORDER — TRAZODONE HYDROCHLORIDE 50 MG/1
150 TABLET ORAL NIGHTLY
Status: DISCONTINUED | OUTPATIENT
Start: 2025-03-17 | End: 2025-03-19 | Stop reason: HOSPADM

## 2025-03-17 RX ORDER — LISINOPRIL 10 MG/1
10 TABLET ORAL DAILY
Status: DISCONTINUED | OUTPATIENT
Start: 2025-03-17 | End: 2025-03-19 | Stop reason: HOSPADM

## 2025-03-17 RX ORDER — DEXTROAMPHETAMINE SACCHARATE, AMPHETAMINE ASPARTATE, DEXTROAMPHETAMINE SULFATE AND AMPHETAMINE SULFATE 2.5; 2.5; 2.5; 2.5 MG/1; MG/1; MG/1; MG/1
10 TABLET ORAL
Status: DISCONTINUED | OUTPATIENT
Start: 2025-03-18 | End: 2025-03-19 | Stop reason: HOSPADM

## 2025-03-17 RX ORDER — DOCUSATE SODIUM 100 MG/1
100 CAPSULE, LIQUID FILLED ORAL 2 TIMES DAILY
Status: DISCONTINUED | OUTPATIENT
Start: 2025-03-17 | End: 2025-03-19 | Stop reason: HOSPADM

## 2025-03-17 RX ORDER — PANTOPRAZOLE SODIUM 40 MG/1
40 TABLET, DELAYED RELEASE ORAL
Status: DISCONTINUED | OUTPATIENT
Start: 2025-03-18 | End: 2025-03-18

## 2025-03-17 RX ORDER — ACETAMINOPHEN 325 MG/1
650 TABLET ORAL EVERY 4 HOURS PRN
Status: DISCONTINUED | OUTPATIENT
Start: 2025-03-17 | End: 2025-03-19 | Stop reason: HOSPADM

## 2025-03-17 RX ORDER — CYCLOBENZAPRINE HCL 5 MG
5 TABLET ORAL 3 TIMES DAILY PRN
Status: DISCONTINUED | OUTPATIENT
Start: 2025-03-17 | End: 2025-03-19 | Stop reason: HOSPADM

## 2025-03-17 RX ORDER — ONDANSETRON HYDROCHLORIDE 2 MG/ML
4 INJECTION, SOLUTION INTRAVENOUS EVERY 8 HOURS PRN
Status: DISCONTINUED | OUTPATIENT
Start: 2025-03-17 | End: 2025-03-19 | Stop reason: HOSPADM

## 2025-03-17 RX ORDER — ATORVASTATIN CALCIUM 40 MG/1
40 TABLET, FILM COATED ORAL NIGHTLY
Status: DISCONTINUED | OUTPATIENT
Start: 2025-03-17 | End: 2025-03-19 | Stop reason: HOSPADM

## 2025-03-17 RX ADMIN — METOPROLOL SUCCINATE 25 MG: 25 TABLET, EXTENDED RELEASE ORAL at 22:52

## 2025-03-17 RX ADMIN — ATORVASTATIN CALCIUM 40 MG: 40 TABLET, FILM COATED ORAL at 22:52

## 2025-03-17 RX ADMIN — TRAZODONE HYDROCHLORIDE 150 MG: 50 TABLET ORAL at 22:52

## 2025-03-17 RX ADMIN — DOCUSATE SODIUM 100 MG: 100 CAPSULE, LIQUID FILLED ORAL at 22:52

## 2025-03-17 RX ADMIN — VENLAFAXINE HYDROCHLORIDE 150 MG: 37.5 CAPSULE, EXTENDED RELEASE ORAL at 22:52

## 2025-03-17 RX ADMIN — IRON SUCROSE 200 MG: 20 INJECTION, SOLUTION INTRAVENOUS at 22:40

## 2025-03-17 ASSESSMENT — PAIN - FUNCTIONAL ASSESSMENT
PAIN_FUNCTIONAL_ASSESSMENT: 0-10
PAIN_FUNCTIONAL_ASSESSMENT: 0-10

## 2025-03-17 ASSESSMENT — PAIN SCALES - GENERAL
PAINLEVEL_OUTOF10: 2
PAINLEVEL_OUTOF10: 2

## 2025-03-17 ASSESSMENT — PAIN DESCRIPTION - LOCATION: LOCATION: BACK

## 2025-03-17 ASSESSMENT — PAIN DESCRIPTION - PAIN TYPE: TYPE: ACUTE PAIN

## 2025-03-17 ASSESSMENT — PAIN DESCRIPTION - DESCRIPTORS: DESCRIPTORS: SHOOTING

## 2025-03-17 ASSESSMENT — ACTIVITIES OF DAILY LIVING (ADL): LACK_OF_TRANSPORTATION: NO

## 2025-03-17 ASSESSMENT — PAIN DESCRIPTION - ORIENTATION: ORIENTATION: LOWER

## 2025-03-17 NOTE — ED PROVIDER NOTES
HPI   Chief Complaint   Patient presents with    Anemia       HPI  Patient is a 74-year-old with past medical history of ADHD, anxiety, depression, glaucoma, HTN, FRANCY, renal insufficiency who reports to the emergency department for concerns of anemia.  Patient states that he went to an urgent care on Friday due to pain in his lower back having persistent.  While there, he states that they stated that his back was okay but they thought they heard a heart murmur and sent him to the emergency department in [place].  He states that on Saturday he went to the emergency department [place] where they stated that they did not hear a heart murmur but stated that he did have evidence of anemia and wanted him to be transferred up to [place] for further evaluation.  They stated they wanted to be seen at [facility] as his doctors have been affiliated with [facility].  Patient states that he has had episodes ranging from once a week to once a month or so over the past year where he feels lightheaded to the point where he sometimes has to drop to his knees until it passes.  Patient denies any actual full collapse or loss of consciousness.  Patient states that he has been seen for this and they have tweaked his blood pressure medications thinking that he had episodes of hypotension.  Patient denies any chest pain, shortness of breath, headache, fevers, vision changes, abdominal pain, nausea or vomiting.  Patient does state that he has had tenderness for a while and that he is been constipated over the past few days with his last bowel movement being approximately 3 days ago.  Patient denies any blood in his urine or stool and also denies any changes in strength or sensation and states he has been able to ambulate normally.      Patient History   Past Medical History:   Diagnosis Date    Abnormal stress electrocardiogram test 08/07/2024    Arthritis of knee, left 07/25/2023    Coronary artery disease involving  native coronary artery of native heart without angina pectoris 07/24/2024    Elevated coronary artery calcium score 07/24/2024    Other specified abnormal findings of blood chemistry     Elevated d-dimer    Other specified abnormal findings of blood chemistry 11/15/2022    D-dimer, elevated    SVT (supraventricular tachycardia) (CMS-HCC) 04/13/2023    Tear of medial meniscus of left knee, current 07/25/2023     Past Surgical History:   Procedure Laterality Date    CARDIAC CATHETERIZATION N/A 8/26/2024    Procedure: Left Heart Cath;  Surgeon: Fortino Starr MD;  Location: [facility] Cardiac Cath Lab;  Service: Cardiovascular;  Laterality: N/A;  [facility] 08.26.2024 @1030     Family History   Problem Relation Name Age of Onset    Other (arteriosclerotic vascular disease) Father       Social History     Tobacco Use    Smoking status: Former     Types: Cigarettes    Smokeless tobacco: Never   Substance Use Topics    Alcohol use: Yes     Comment: occassional    Drug use: Never       Physical Exam   ED Triage Vitals [03/17/25 1323]   Temperature Heart Rate Respirations BP   36 °C (96.8 °F) 72 18 110/72      Pulse Ox Temp Source Heart Rate Source Patient Position   99 % Temporal -- Sitting      BP Location FiO2 (%)     Left arm --       Physical Exam  Vitals and nursing note reviewed.   Constitutional:       General: He is not in acute distress.     Appearance: He is well-developed.   HENT:      Head: Normocephalic and atraumatic.   Eyes:      Conjunctiva/sclera: Conjunctivae normal.   Cardiovascular:      Rate and Rhythm: Normal rate and regular rhythm.      Pulses: Normal pulses.      Heart sounds: No murmur heard.  Pulmonary:      Effort: Pulmonary effort is normal. No respiratory distress.      Breath sounds: Normal breath sounds.      Comments: Lungs clear to auscultation bilaterally  Abdominal:      Palpations: Abdomen is soft.      Tenderness: There is no abdominal tenderness.   Musculoskeletal:         General: No  swelling.      Cervical back: Neck supple.      Right lower leg: No edema.      Left lower leg: No edema.   Skin:     General: Skin is warm and dry.      Capillary Refill: Capillary refill takes less than 2 seconds.   Neurological:      Mental Status: He is alert and oriented to person, place, and time.      GCS: GCS eye subscore is 4. GCS verbal subscore is 5. GCS motor subscore is 6.   Psychiatric:         Mood and Affect: Mood normal.     ED Course & MDM   Diagnoses as of 03/17/25 1447   Anemia, unspecified type       Medical Decision Making  Patient is a 74-year-old with past medical history of ADHD, anxiety, depression, glaucoma, HTN, FRANCY, renal insufficiency who reports to the emergency department for concerns of anemia.  Patient's vitals were stable and within normal notes upon presentation.  Patient's CBC showed no evidence leukocytosis with WBC of 7.6 with an anemia with a hemoglobin of 8.6.  Chart review shows that patient's hemoglobin was measured at 8.1 on 3/14.  Patient also had a CT angio PE along with a CT abdomen pelvis performed on 3/14 which showed no evidence of pulmonary embolism, pelvic, or intra-abdominal findings.  Patient's BNP showed glucose within normal is at 83 and no severe electrolyte abnormalities.  BUN, creatinine, and EGFR consistent with prior measurements and consistent with his CKD.  Patient's lipase was 51 making pancreatitis unlikely.  Patient's BNP was 56 making CHF unlikely.  Patient hepatic function showed no evidence of elevated liver enzymes.  Patient CT head showed no acute intracranial processes.  Patient is occult stool was negative for blood.  Patient's ferritin was low at 14 with iron on a low end of normal at 36 and percent saturation low at 9.  Patient admitted to observation under Dr. Mullins for continued care and possible scope in the morning in stable condition to further evaluate potential causes of patient's anemia.  Patient understood and was agreeable with  the plan.    Procedure  Procedures none     Paul Hallman DO  Resident  03/17/25 7140

## 2025-03-17 NOTE — TELEPHONE ENCOUNTER
Pt went to er and was dx with anemia. They wanted to admit for testing. Due to them not being able to do the testing over the weekend they agreed to let him go home and contact you. He's asking that you order testing for anemia.

## 2025-03-17 NOTE — ED TRIAGE NOTES
TRIAGE NOTE   I saw the patient as the Clinician in Triage and performed a brief history and physical exam, established acuity, and ordered appropriate tests to develop basic plan of care. Patient will be seen by an DONTA, resident and/or physician who will independently evaluate the patient. Please see subsequent provider notes for further details and disposition.     Brief HPI: In brief, Paul Prather is a 74 y.o. male that presents for admission referred by PCP. Patient reports he was told he was anemic over the weekend and that they did not have the appropriate test in the hospital, so instead of admitting him, told him to go home and return on Monday.  States he has had issues with falling and lightheadedness over the last year.  Most recent episode was on Saturday.  He went in because he had lower back pain with the lightheadedness and he was told that all of his scans were normal but that he was anemic.  Unclear cause patient reports no melena or any other areas of bleeding.  He was seen for this quite some time ago and stated that his doctors decreased his blood pressure medication but this has not significantly helped.  He has had approximately 20 pound weight loss.  Reports that he is mildly short of breath when walking upstairs.  Reports 2 drinks per week.  Reports no dizziness.    Focused Physical exam:   Pale skin, protuberant abdomen, lumbar tenderness without radiation to the legs.  Vital signs normal.    Plan/MDM:   Patient was seen on 3/14 at [facility] ED.  Labs showed kidney disease, elevated D-dimer, hemoglobin 8.1, and normal liver function.  Underwent CT angio chest, CT abdomen and pelvis, and CT lumbar spine.  Essentially normal.  Will leave additional testing up to subsequent provider.    Please see subsequent provider note for further details and disposition

## 2025-03-17 NOTE — ED TRIAGE NOTES
PT has had multiple near syncope episodes this year. Med compliant. Low hemoglobin, 8.1 on Saturday. PCP wants him admitted.

## 2025-03-17 NOTE — H&P
History of present illness:  Paul Prather is a 74 y.o. male with PMH of ADHD, HTN, CAD, depression, HLD, CKD presenting to the hospital for abnormal blood counts. Patient reports he went to an urgent care on Friday due to pain in his right lower back. He states this pain came on after lifting a 40 pound bag of dirt. He reports the urgent care told him his back was okay, but they heard a heart murmur and told him to go to the emergency room. The nearest emergency room to patient is [facility] so he presented there. Patient states [facility] told him they did not hear a heart murmur; however, his blood counts are low, but with it being Saturday they were unable to do any scopes until Monday. After discussing with [facility], patient states the hospital told him he was safe to return home as scopes would not be done on the weekend. Patient reports talking to his primary care doctor today who urged him to come to a  hospital as his primary doctor is within the  system. Patient states he has been experiencing lightheadedness since September of 2023. He reports the lightheadedness has violetta going on ever since intermittently. He states he has never lost consciousness, but he has gone down on all fours until the lightheadedness has passed. Additionally, patient endorses difficultly swallowing that has been going on for approximately a week. He reports the sensation like the food is getting stuck when he eats. Endorses he hasn't had a bowel movement X 3 days. Denies fever, chills, chest pain, SOB, abdominal pain, nausea, vomiting, hematemesis, melena, hematochezia, hematuria.     Past Medical History:  08/07/2024: Abnormal stress electrocardiogram test  07/25/2023: Arthritis of knee, left  07/24/2024: Coronary artery disease involving native coronary artery   of native heart without angina pectoris  07/24/2024: Elevated coronary artery calcium score  No date: Other specified abnormal findings of blood chemistry     "  Comment:  Elevated d-dimer  11/15/2022: Other specified abnormal findings of blood chemistry      Comment:  D-dimer, elevated  04/13/2023: SVT (supraventricular tachycardia) (CMS-HCC)  07/25/2023: Tear of medial meniscus of left knee, current     Full ROS done and negative except as stated above.      Surgical History:  He has a past surgical history that includes Cardiac catheterization (N/A, 8/26/2024).    Social History     Socioeconomic History    Marital status:    Tobacco Use    Smoking status: Former     Types: Cigarettes    Smokeless tobacco: Never   Substance and Sexual Activity    Alcohol use: Yes     Comment: occassional    Drug use: Never       Family History   Problem Relation Name Age of Onset    Other (arteriosclerotic vascular disease) Father          Home meds:  Current Outpatient Medications   Medication Instructions    amphetamine-dextroamphetamine XR (Adderall XR) 20 mg 24 hr capsule 20 mg, oral, Every morning, Do not crush or chew.    atorvastatin (LIPITOR) 40 mg, oral, Daily    cetirizine (ZYRTEC) 10 mg, oral, Daily    clonazePAM (KLONOPIN) 0.5 mg, oral, Every 12 hours PRN    latanoprost (Xalatan) 0.005 % ophthalmic solution 1 drop, Both Eyes, Nightly    lisinopril 10 mg, oral, Daily    metoprolol succinate XL (TOPROL-XL) 25 mg, oral, Daily    multivit-minerals/folic acid (CENTRUM ADULTS ORAL) No dose, route, or frequency recorded.    traZODone (DESYREL) 150 mg, oral, Nightly    venlafaxine XR (EFFEXOR-XR) 150 mg, oral, Daily       Vitals (Last 24 Hours):  Heart Rate:  [72]   Temperature:  [36 °C (96.8 °F)]   Respirations:  [18]   BP: (110)/(72)   Height:  [177.8 cm (5' 10\")]   Weight:  [83 kg (183 lb)]   Pulse Ox:  [99 %]      PHYSICAL EXAM:  Constitutional: NAD, alert and cooperative  Eyes: no icterus  ENMT: mucous membranes moist, no lesions  Head/Neck: supple  Respiratory/Thorax: CTA bilaterally, non-labored breathing, no cough, on RA  Cardiovascular: RRR, no murmurs " heard  Gastrointestinal: ND/S/NT  : no Smiley, no SP/flank discomfort  Musculoskeletal: no joint swelling, ROM intact, mildly TTP right/left lower back  Extremities: no edema  Neurological: non-focal  Skin: warm and dry  Psych: calm, stable mood     MEDS:  [START ON 3/18/2025] amphetamine-dextroamphetamine, 10 mg, oral, BID  atorvastatin, 40 mg, oral, Nightly  docusate sodium, 100 mg, oral, BID  iron sucrose, 200 mg, intravenous, Once  [Held by provider] lisinopril, 10 mg, oral, Daily  metoprolol succinate XL, 25 mg, oral, Daily  [START ON 3/18/2025] pantoprazole, 40 mg, oral, Daily before breakfast  traZODone, 150 mg, oral, Nightly  venlafaxine XR, 150 mg, oral, Daily              PRN medications: acetaminophen **OR** acetaminophen **OR** acetaminophen, cyclobenzaprine, ondansetron **OR** ondansetron, polyethylene glycol      I have reviewed all imaging reports and labs pertinent to this visit    ASSESSMENT/PLAN:  Paul Prather is a 74 y.o. male with PMH of ADHD, HTN, CAD, depression, HLD, CKD presenting to the hospital for abnormal blood counts. Patient reports he went to an urgent care on Friday due to pain in his right lower back. He states this pain came on after lifting a 40 pound bag of dirt. He reports the urgent care told him his back was okay, but they heard a heart murmur and told him to go to the emergency room. The nearest emergency room to patient is [facility] so he presented there. Patient states [facility] told him they did not hear a heart murmur; however, his blood counts are low, but with it being Saturday they were unable to do any scopes until Monday. After discussing with [facility], patient states the hospital told him he was safe to return home as scopes would not be done on the weekend. Patient reports talking to his primary care doctor today who urged him to come to a  hospital as his primary doctor is within the  system. Patient states he has been experiencing lightheadedness  since September of 2023. He reports the lightheadedness has violetta going on ever since intermittently. He states he has never lost consciousness, but he has gone down on all fours until the lightheadedness has passed. Additionally, patient endorses difficultly swallowing that has been going on for approximately a week. He reports the sensation like the food is getting stuck when he eats. Endorses he hasn't had a bowel movement X 3 days. Denies fever, chills, chest pain, SOB, abdominal pain, nausea, vomiting, hematemesis, melena, hematochezia, hematuria.     Near syncope  Anemia  Dysphagia  -suspect dizziness 2/2 to new onset anemia  -patient endorses difficultly swallowing x 1 week  -hgb 8/19/24 14.5   -hgb 8.1 at [place]  -hgb 8.6 on admit  -IV venofer 200 mg   -tele  -orthos  -occult stool negative  -denies any blank or overt blood in stool   -clear liquid diet for now, NPO at midnight. -GI consulted for anemia, dysphagia     Back Pain  -Patient endorsed pain after lifting 40 pound bag of dirt  -CT lumbar spine: Postsurgical changes of lumbar spine similar to prior radiograph   without acute radiographic abnormality.   -Flexeril 5 mg TID prn  -tylenol 650 mg q 4 hrs prn    CKD  -baseline creat labile from 1.4-1.7. Was 1.54 3/14/25   -creat on admit 1.68  -avoid nephrotoxic agents    HTN  -recheck creat in AM, if improved or stable will resume lisinopril  -hold for now given labile creat with mild jump from 3/14/25    HLD  -Continue atorvastatin 40 mg nightly     Depression  -Continue venlafaxine 150 mg daily     ADHD  -Continue Adderall 10 mg BID    Other comorbidities as above  -continue medications as ordered and adjust based on clinical course     VTE / GI prophylaxis   -low risk, encourage ambulation, PPI, bowel regimen in place     Discharge planning  -[facility] when medically ready    Discussed with Dr. Mullins and the interdisciplinary team     Marisel Guo PA-C

## 2025-03-17 NOTE — HOSPITAL COURSE
"Presenting today for anemia. He was seen at [facility] on 3/14/25 for back pain and lightheadedness. He endorses the dizziness worsens when he goes from sitting to standing. Concern for neaar syncope.  His hgb at that time was 8.1. however they were unable to do test (unclear what test) they sent him home. However upon chart review \"spoke with the patient in regards to being admitted versus discharged and at this time he is preferring to be discharged home and would like to follow-up with his own physician on Monday in regards to his anemia\"      PMHX: anxiety. ADHD, HTN, CAD, depression, HLD, CKD     Near syncope  Anemia  -suspect dizziness 2/2 to new onset anemia  -hgb 8/19/24 14.5   -hgb 8.1 at [facility]  -hgb 8.6 on admit  -IV venofer  -tele  -orthos  -occult stool negative  -denies any blank or overt blood in stool   -clear liquid diet for now, NPO at midnight. Re-eval in AM, consider GI/ heme consult     CKD  -baseline creat labile from 1.4-1.7. Was 1.54 3/14/25   -creat on admit 1.68  -avoid nephrotoxic agents    HTN  -recheck creat in AM, if improved or stable will resume lisinopril  -hold for now given labile creat with mild jump from 3/14/25    Anxiety/Depression/ADHD  -Resume home meds    DVT prophylaxis:  Low risk, SCD    DC plan:  DC home when medically stable    Labs/Testing reviewed    Interdisciplinary team rounding completed with hospitalist, nurse, TCC    PA discussed plan and lab/testing results with Dr. Mullins        "

## 2025-03-18 ENCOUNTER — ANESTHESIA (OUTPATIENT)
Dept: GASTROENTEROLOGY | Facility: HOSPITAL | Age: 75
End: 2025-03-18
Payer: MEDICARE

## 2025-03-18 ENCOUNTER — APPOINTMENT (OUTPATIENT)
Dept: CARDIOLOGY | Facility: HOSPITAL | Age: 75
End: 2025-03-18
Payer: MEDICARE

## 2025-03-18 ENCOUNTER — ANESTHESIA EVENT (OUTPATIENT)
Dept: GASTROENTEROLOGY | Facility: HOSPITAL | Age: 75
End: 2025-03-18
Payer: MEDICARE

## 2025-03-18 ENCOUNTER — APPOINTMENT (OUTPATIENT)
Dept: GASTROENTEROLOGY | Facility: HOSPITAL | Age: 75
End: 2025-03-18
Payer: MEDICARE

## 2025-03-18 PROBLEM — D64.9 ANEMIA, UNSPECIFIED TYPE: Status: ACTIVE | Noted: 2025-03-18

## 2025-03-18 LAB
ABO GROUP (TYPE) IN BLOOD: NORMAL
ANION GAP SERPL CALC-SCNC: 8 MMOL/L (ref 10–20)
ANION GAP SERPL CALC-SCNC: 9 MMOL/L (ref 10–20)
ANTIBODY SCREEN: NORMAL
APPEARANCE UR: CLEAR
ATRIAL RATE: 48 BPM
BASOPHILS # BLD AUTO: 0.01 X10*3/UL (ref 0–0.1)
BASOPHILS # BLD AUTO: 0.01 X10*3/UL (ref 0–0.1)
BASOPHILS NFR BLD AUTO: 0.1 %
BASOPHILS NFR BLD AUTO: 0.1 %
BILIRUB UR STRIP.AUTO-MCNC: NEGATIVE MG/DL
BUN SERPL-MCNC: 32 MG/DL (ref 6–23)
BUN SERPL-MCNC: 33 MG/DL (ref 6–23)
CALCIUM SERPL-MCNC: 8 MG/DL (ref 8.6–10.3)
CALCIUM SERPL-MCNC: 8.3 MG/DL (ref 8.6–10.3)
CHLORIDE SERPL-SCNC: 108 MMOL/L (ref 98–107)
CHLORIDE SERPL-SCNC: 110 MMOL/L (ref 98–107)
CO2 SERPL-SCNC: 26 MMOL/L (ref 21–32)
CO2 SERPL-SCNC: 27 MMOL/L (ref 21–32)
COLOR UR: NORMAL
CREAT SERPL-MCNC: 1.44 MG/DL (ref 0.5–1.3)
CREAT SERPL-MCNC: 1.53 MG/DL (ref 0.5–1.3)
EGFRCR SERPLBLD CKD-EPI 2021: 47 ML/MIN/1.73M*2
EGFRCR SERPLBLD CKD-EPI 2021: 51 ML/MIN/1.73M*2
EOSINOPHIL # BLD AUTO: 0.13 X10*3/UL (ref 0–0.4)
EOSINOPHIL # BLD AUTO: 0.13 X10*3/UL (ref 0–0.4)
EOSINOPHIL NFR BLD AUTO: 1.6 %
EOSINOPHIL NFR BLD AUTO: 1.8 %
ERYTHROCYTE [DISTWIDTH] IN BLOOD BY AUTOMATED COUNT: 16 % (ref 11.5–14.5)
ERYTHROCYTE [DISTWIDTH] IN BLOOD BY AUTOMATED COUNT: 16 % (ref 11.5–14.5)
ERYTHROCYTE [DISTWIDTH] IN BLOOD BY AUTOMATED COUNT: 16.1 % (ref 11.5–14.5)
FOLATE SERPL-MCNC: >24 NG/ML
GLUCOSE SERPL-MCNC: 74 MG/DL (ref 74–99)
GLUCOSE SERPL-MCNC: 77 MG/DL (ref 74–99)
GLUCOSE UR STRIP.AUTO-MCNC: NORMAL MG/DL
HCT VFR BLD AUTO: 24 % (ref 41–52)
HCT VFR BLD AUTO: 24.7 % (ref 41–52)
HCT VFR BLD AUTO: 25.2 % (ref 41–52)
HGB BLD-MCNC: 7.1 G/DL (ref 13.5–17.5)
HGB BLD-MCNC: 7.5 G/DL (ref 13.5–17.5)
HGB BLD-MCNC: 7.6 G/DL (ref 13.5–17.5)
HGB RETIC QN: 23 PG (ref 28–38)
HOLD SPECIMEN: NORMAL
IMM GRANULOCYTES # BLD AUTO: 0.03 X10*3/UL (ref 0–0.5)
IMM GRANULOCYTES # BLD AUTO: 0.04 X10*3/UL (ref 0–0.5)
IMM GRANULOCYTES NFR BLD AUTO: 0.4 % (ref 0–0.9)
IMM GRANULOCYTES NFR BLD AUTO: 0.5 % (ref 0–0.9)
IMMATURE RETIC FRACTION: 25.8 %
KETONES UR STRIP.AUTO-MCNC: NEGATIVE MG/DL
LEUKOCYTE ESTERASE UR QL STRIP.AUTO: NEGATIVE
LYMPHOCYTES # BLD AUTO: 1.32 X10*3/UL (ref 0.8–3)
LYMPHOCYTES # BLD AUTO: 1.46 X10*3/UL (ref 0.8–3)
LYMPHOCYTES NFR BLD AUTO: 17.8 %
LYMPHOCYTES NFR BLD AUTO: 18.4 %
MAGNESIUM SERPL-MCNC: 2.06 MG/DL (ref 1.6–2.4)
MCH RBC QN AUTO: 23.8 PG (ref 26–34)
MCH RBC QN AUTO: 24.1 PG (ref 26–34)
MCH RBC QN AUTO: 24.3 PG (ref 26–34)
MCHC RBC AUTO-ENTMCNC: 29.6 G/DL (ref 32–36)
MCHC RBC AUTO-ENTMCNC: 29.8 G/DL (ref 32–36)
MCHC RBC AUTO-ENTMCNC: 30.8 G/DL (ref 32–36)
MCV RBC AUTO: 77 FL (ref 80–100)
MCV RBC AUTO: 82 FL (ref 80–100)
MCV RBC AUTO: 82 FL (ref 80–100)
MONOCYTES # BLD AUTO: 0.68 X10*3/UL (ref 0.05–0.8)
MONOCYTES # BLD AUTO: 0.7 X10*3/UL (ref 0.05–0.8)
MONOCYTES NFR BLD AUTO: 8.3 %
MONOCYTES NFR BLD AUTO: 9.8 %
NEUTROPHILS # BLD AUTO: 4.98 X10*3/UL (ref 1.6–5.5)
NEUTROPHILS # BLD AUTO: 5.88 X10*3/UL (ref 1.6–5.5)
NEUTROPHILS NFR BLD AUTO: 69.5 %
NEUTROPHILS NFR BLD AUTO: 71.7 %
NITRITE UR QL STRIP.AUTO: NEGATIVE
NRBC BLD-RTO: 0 /100 WBCS (ref 0–0)
P AXIS: 36 DEGREES
P OFFSET: 182 MS
P ONSET: 143 MS
PH UR STRIP.AUTO: 7 [PH]
PLATELET # BLD AUTO: 206 X10*3/UL (ref 150–450)
PLATELET # BLD AUTO: 216 X10*3/UL (ref 150–450)
PLATELET # BLD AUTO: 235 X10*3/UL (ref 150–450)
POTASSIUM SERPL-SCNC: 4.5 MMOL/L (ref 3.5–5.3)
POTASSIUM SERPL-SCNC: 4.7 MMOL/L (ref 3.5–5.3)
PR INTERVAL: 134 MS
PROT UR STRIP.AUTO-MCNC: NEGATIVE MG/DL
Q ONSET: 210 MS
QRS COUNT: 8 BEATS
QRS DURATION: 96 MS
QT INTERVAL: 456 MS
QTC CALCULATION(BAZETT): 407 MS
QTC FREDERICIA: 423 MS
R AXIS: -32 DEGREES
RBC # BLD AUTO: 2.94 X10*6/UL (ref 4.5–5.9)
RBC # BLD AUTO: 3.09 X10*6/UL (ref 4.5–5.9)
RBC # BLD AUTO: 3.19 X10*6/UL (ref 4.5–5.9)
RBC # UR STRIP.AUTO: NEGATIVE MG/DL
RETICS #: 0.06 X10*6/UL (ref 0.02–0.11)
RETICS/RBC NFR AUTO: 1.9 % (ref 0.5–2)
RH FACTOR (ANTIGEN D): NORMAL
SODIUM SERPL-SCNC: 138 MMOL/L (ref 136–145)
SODIUM SERPL-SCNC: 140 MMOL/L (ref 136–145)
SP GR UR STRIP.AUTO: 1.02
T AXIS: -13 DEGREES
T OFFSET: 438 MS
UROBILINOGEN UR STRIP.AUTO-MCNC: NORMAL MG/DL
VENTRICULAR RATE: 48 BPM
WBC # BLD AUTO: 7.2 X10*3/UL (ref 4.4–11.3)
WBC # BLD AUTO: 7.5 X10*3/UL (ref 4.4–11.3)
WBC # BLD AUTO: 8.2 X10*3/UL (ref 4.4–11.3)

## 2025-03-18 PROCEDURE — 3700000002 HC GENERAL ANESTHESIA TIME - EACH INCREMENTAL 1 MINUTE

## 2025-03-18 PROCEDURE — 0DB38ZX EXCISION OF LOWER ESOPHAGUS, VIA NATURAL OR ARTIFICIAL OPENING ENDOSCOPIC, DIAGNOSTIC: ICD-10-PCS | Performed by: INTERNAL MEDICINE

## 2025-03-18 PROCEDURE — 99232 SBSQ HOSP IP/OBS MODERATE 35: CPT

## 2025-03-18 PROCEDURE — 88342 IMHCHEM/IMCYTCHM 1ST ANTB: CPT | Mod: TC,AHULAB | Performed by: INTERNAL MEDICINE

## 2025-03-18 PROCEDURE — 80048 BASIC METABOLIC PNL TOTAL CA: CPT | Performed by: NURSE PRACTITIONER

## 2025-03-18 PROCEDURE — 36415 COLL VENOUS BLD VENIPUNCTURE: CPT | Performed by: HOSPITALIST

## 2025-03-18 PROCEDURE — 43255 EGD CONTROL BLEEDING ANY: CPT | Performed by: INTERNAL MEDICINE

## 2025-03-18 PROCEDURE — 36415 COLL VENOUS BLD VENIPUNCTURE: CPT | Performed by: NURSE PRACTITIONER

## 2025-03-18 PROCEDURE — 85045 AUTOMATED RETICULOCYTE COUNT: CPT | Performed by: INTERNAL MEDICINE

## 2025-03-18 PROCEDURE — A43239 PR EDG TRANSORAL BIOPSY SINGLE/MULTIPLE: Performed by: ANESTHESIOLOGIST ASSISTANT

## 2025-03-18 PROCEDURE — 2500000004 HC RX 250 GENERAL PHARMACY W/ HCPCS (ALT 636 FOR OP/ED)

## 2025-03-18 PROCEDURE — 93010 ELECTROCARDIOGRAM REPORT: CPT | Performed by: INTERNAL MEDICINE

## 2025-03-18 PROCEDURE — 2500000004 HC RX 250 GENERAL PHARMACY W/ HCPCS (ALT 636 FOR OP/ED): Performed by: INTERNAL MEDICINE

## 2025-03-18 PROCEDURE — 7100000009 HC PHASE TWO TIME - INITIAL BASE CHARGE

## 2025-03-18 PROCEDURE — 7100000010 HC PHASE TWO TIME - EACH INCREMENTAL 1 MINUTE

## 2025-03-18 PROCEDURE — 85025 COMPLETE CBC W/AUTO DIFF WBC: CPT | Performed by: NURSE PRACTITIONER

## 2025-03-18 PROCEDURE — 99222 1ST HOSP IP/OBS MODERATE 55: CPT | Performed by: NURSE PRACTITIONER

## 2025-03-18 PROCEDURE — 85025 COMPLETE CBC W/AUTO DIFF WBC: CPT | Performed by: HOSPITALIST

## 2025-03-18 PROCEDURE — 2500000004 HC RX 250 GENERAL PHARMACY W/ HCPCS (ALT 636 FOR OP/ED): Performed by: HOSPITALIST

## 2025-03-18 PROCEDURE — 2500000001 HC RX 250 WO HCPCS SELF ADMINISTERED DRUGS (ALT 637 FOR MEDICARE OP): Performed by: NURSE PRACTITIONER

## 2025-03-18 PROCEDURE — 1100000001 HC PRIVATE ROOM DAILY

## 2025-03-18 PROCEDURE — 93005 ELECTROCARDIOGRAM TRACING: CPT

## 2025-03-18 PROCEDURE — 83735 ASSAY OF MAGNESIUM: CPT | Performed by: HOSPITALIST

## 2025-03-18 PROCEDURE — 85027 COMPLETE CBC AUTOMATED: CPT

## 2025-03-18 PROCEDURE — 81003 URINALYSIS AUTO W/O SCOPE: CPT | Performed by: PHYSICIAN ASSISTANT

## 2025-03-18 PROCEDURE — 86901 BLOOD TYPING SEROLOGIC RH(D): CPT

## 2025-03-18 PROCEDURE — 3700000001 HC GENERAL ANESTHESIA TIME - INITIAL BASE CHARGE

## 2025-03-18 PROCEDURE — 80048 BASIC METABOLIC PNL TOTAL CA: CPT | Performed by: HOSPITALIST

## 2025-03-18 PROCEDURE — 43239 EGD BIOPSY SINGLE/MULTIPLE: CPT | Performed by: INTERNAL MEDICINE

## 2025-03-18 PROCEDURE — 2500000004 HC RX 250 GENERAL PHARMACY W/ HCPCS (ALT 636 FOR OP/ED): Performed by: ANESTHESIOLOGIST ASSISTANT

## 2025-03-18 RX ORDER — VENLAFAXINE HYDROCHLORIDE 37.5 MG/1
150 CAPSULE, EXTENDED RELEASE ORAL NIGHTLY
Status: DISCONTINUED | OUTPATIENT
Start: 2025-03-18 | End: 2025-03-19 | Stop reason: HOSPADM

## 2025-03-18 RX ORDER — MIDAZOLAM HYDROCHLORIDE 1 MG/ML
INJECTION INTRAMUSCULAR; INTRAVENOUS AS NEEDED
Status: DISCONTINUED | OUTPATIENT
Start: 2025-03-18 | End: 2025-03-18

## 2025-03-18 RX ORDER — EPINEPHRINE 0.1 MG/ML
INJECTION INTRACARDIAC; INTRAVENOUS AS NEEDED
Status: DISCONTINUED | OUTPATIENT
Start: 2025-03-18 | End: 2025-03-18 | Stop reason: HOSPADM

## 2025-03-18 RX ORDER — PROPOFOL 10 MG/ML
INJECTION, EMULSION INTRAVENOUS AS NEEDED
Status: DISCONTINUED | OUTPATIENT
Start: 2025-03-18 | End: 2025-03-18

## 2025-03-18 RX ORDER — PANTOPRAZOLE SODIUM 40 MG/10ML
40 INJECTION, POWDER, LYOPHILIZED, FOR SOLUTION INTRAVENOUS 2 TIMES DAILY
Status: DISCONTINUED | OUTPATIENT
Start: 2025-03-18 | End: 2025-03-19 | Stop reason: HOSPADM

## 2025-03-18 RX ADMIN — ATORVASTATIN CALCIUM 40 MG: 40 TABLET, FILM COATED ORAL at 21:20

## 2025-03-18 RX ADMIN — TRAZODONE HYDROCHLORIDE 150 MG: 50 TABLET ORAL at 21:20

## 2025-03-18 RX ADMIN — VENLAFAXINE HYDROCHLORIDE 150 MG: 37.5 CAPSULE, EXTENDED RELEASE ORAL at 21:20

## 2025-03-18 RX ADMIN — DEXTROAMPHETAMINE SACCHARATE, AMPHETAMINE ASPARTATE, DEXTROAMPHETAMINE SULFATE AND AMPHETAMINE SULFATE 10 MG: 2.5; 2.5; 2.5; 2.5 TABLET ORAL at 12:34

## 2025-03-18 RX ADMIN — PANTOPRAZOLE SODIUM 40 MG: 40 INJECTION, POWDER, FOR SOLUTION INTRAVENOUS at 21:31

## 2025-03-18 RX ADMIN — DEXTROAMPHETAMINE SACCHARATE, AMPHETAMINE ASPARTATE, DEXTROAMPHETAMINE SULFATE AND AMPHETAMINE SULFATE 10 MG: 2.5; 2.5; 2.5; 2.5 TABLET ORAL at 08:44

## 2025-03-18 RX ADMIN — PROPOFOL 100 MCG/KG/MIN: 10 INJECTION, EMULSION INTRAVENOUS at 10:05

## 2025-03-18 RX ADMIN — DOCUSATE SODIUM 100 MG: 100 CAPSULE, LIQUID FILLED ORAL at 21:19

## 2025-03-18 RX ADMIN — SODIUM CHLORIDE 500 ML: 0.9 INJECTION, SOLUTION INTRAVENOUS at 13:49

## 2025-03-18 RX ADMIN — SODIUM CHLORIDE 500 ML: 0.9 INJECTION, SOLUTION INTRAVENOUS at 03:02

## 2025-03-18 RX ADMIN — MIDAZOLAM HYDROCHLORIDE 2 MG: 1 INJECTION, SOLUTION INTRAMUSCULAR; INTRAVENOUS at 10:02

## 2025-03-18 RX ADMIN — PROPOFOL 52.8 MG: 10 INJECTION, EMULSION INTRAVENOUS at 10:04

## 2025-03-18 RX ADMIN — DOCUSATE SODIUM 100 MG: 100 CAPSULE, LIQUID FILLED ORAL at 08:44

## 2025-03-18 RX ADMIN — PANTOPRAZOLE SODIUM 40 MG: 40 TABLET, DELAYED RELEASE ORAL at 06:15

## 2025-03-18 RX ADMIN — EPINEPHRINE 0.35 MG: 0.1 INJECTION INTRACARDIAC; INTRAVENOUS at 10:18

## 2025-03-18 RX ADMIN — PANTOPRAZOLE SODIUM 40 MG: 40 INJECTION, POWDER, FOR SOLUTION INTRAVENOUS at 12:25

## 2025-03-18 SDOH — SOCIAL STABILITY: SOCIAL INSECURITY: DO YOU FEEL ANYONE HAS EXPLOITED OR TAKEN ADVANTAGE OF YOU FINANCIALLY OR OF YOUR PERSONAL PROPERTY?: NO

## 2025-03-18 SDOH — ECONOMIC STABILITY: HOUSING INSECURITY: IN THE LAST 12 MONTHS, WAS THERE A TIME WHEN YOU WERE NOT ABLE TO PAY THE MORTGAGE OR RENT ON TIME?: NO

## 2025-03-18 SDOH — SOCIAL STABILITY: SOCIAL INSECURITY: WITHIN THE LAST YEAR, HAVE YOU BEEN HUMILIATED OR EMOTIONALLY ABUSED IN OTHER WAYS BY YOUR PARTNER OR EX-PARTNER?: NO

## 2025-03-18 SDOH — HEALTH STABILITY: MENTAL HEALTH
DO YOU FEEL STRESS - TENSE, RESTLESS, NERVOUS, OR ANXIOUS, OR UNABLE TO SLEEP AT NIGHT BECAUSE YOUR MIND IS TROUBLED ALL THE TIME - THESE DAYS?: ONLY A LITTLE

## 2025-03-18 SDOH — HEALTH STABILITY: MENTAL HEALTH: HOW OFTEN DO YOU HAVE SIX OR MORE DRINKS ON ONE OCCASION?: NEVER

## 2025-03-18 SDOH — SOCIAL STABILITY: SOCIAL INSECURITY: DO YOU FEEL UNSAFE GOING BACK TO THE PLACE WHERE YOU ARE LIVING?: NO

## 2025-03-18 SDOH — HEALTH STABILITY: PHYSICAL HEALTH
HOW OFTEN DO YOU NEED TO HAVE SOMEONE HELP YOU WHEN YOU READ INSTRUCTIONS, PAMPHLETS, OR OTHER WRITTEN MATERIAL FROM YOUR DOCTOR OR PHARMACY?: NEVER

## 2025-03-18 SDOH — ECONOMIC STABILITY: TRANSPORTATION INSECURITY: IN THE PAST 12 MONTHS, HAS LACK OF TRANSPORTATION KEPT YOU FROM MEDICAL APPOINTMENTS OR FROM GETTING MEDICATIONS?: NO

## 2025-03-18 SDOH — SOCIAL STABILITY: SOCIAL INSECURITY
WITHIN THE LAST YEAR, HAVE YOU BEEN KICKED, HIT, SLAPPED, OR OTHERWISE PHYSICALLY HURT BY YOUR PARTNER OR EX-PARTNER?: NO

## 2025-03-18 SDOH — SOCIAL STABILITY: SOCIAL INSECURITY: ARE YOU OR HAVE YOU BEEN THREATENED OR ABUSED PHYSICALLY, EMOTIONALLY, OR SEXUALLY BY ANYONE?: NO

## 2025-03-18 SDOH — ECONOMIC STABILITY: INCOME INSECURITY: IN THE PAST 12 MONTHS HAS THE ELECTRIC, GAS, OIL, OR WATER COMPANY THREATENED TO SHUT OFF SERVICES IN YOUR HOME?: NO

## 2025-03-18 SDOH — ECONOMIC STABILITY: HOUSING INSECURITY: AT ANY TIME IN THE PAST 12 MONTHS, WERE YOU HOMELESS OR LIVING IN A SHELTER (INCLUDING NOW)?: NO

## 2025-03-18 SDOH — HEALTH STABILITY: MENTAL HEALTH: HOW MANY DRINKS CONTAINING ALCOHOL DO YOU HAVE ON A TYPICAL DAY WHEN YOU ARE DRINKING?: 1 OR 2

## 2025-03-18 SDOH — HEALTH STABILITY: PHYSICAL HEALTH: ON AVERAGE, HOW MANY MINUTES DO YOU ENGAGE IN EXERCISE AT THIS LEVEL?: 30 MIN

## 2025-03-18 SDOH — SOCIAL STABILITY: SOCIAL INSECURITY: WITHIN THE LAST YEAR, HAVE YOU BEEN AFRAID OF YOUR PARTNER OR EX-PARTNER?: NO

## 2025-03-18 SDOH — SOCIAL STABILITY: SOCIAL INSECURITY: ARE YOU MARRIED, WIDOWED, DIVORCED, SEPARATED, NEVER MARRIED, OR LIVING WITH A PARTNER?: MARRIED

## 2025-03-18 SDOH — SOCIAL STABILITY: SOCIAL NETWORK: HOW OFTEN DO YOU ATTEND MEETINGS OF THE CLUBS OR ORGANIZATIONS YOU BELONG TO?: 1 TO 4 TIMES PER YEAR

## 2025-03-18 SDOH — SOCIAL STABILITY: SOCIAL INSECURITY: HAVE YOU HAD THOUGHTS OF HARMING ANYONE ELSE?: NO

## 2025-03-18 SDOH — ECONOMIC STABILITY: FOOD INSECURITY: WITHIN THE PAST 12 MONTHS, YOU WORRIED THAT YOUR FOOD WOULD RUN OUT BEFORE YOU GOT THE MONEY TO BUY MORE.: NEVER TRUE

## 2025-03-18 SDOH — HEALTH STABILITY: MENTAL HEALTH: HOW OFTEN DO YOU HAVE A DRINK CONTAINING ALCOHOL?: MONTHLY OR LESS

## 2025-03-18 SDOH — SOCIAL STABILITY: SOCIAL NETWORK
DO YOU BELONG TO ANY CLUBS OR ORGANIZATIONS SUCH AS CHURCH GROUPS, UNIONS, FRATERNAL OR ATHLETIC GROUPS, OR SCHOOL GROUPS?: NO

## 2025-03-18 SDOH — HEALTH STABILITY: MENTAL HEALTH: CURRENT SMOKER: 0

## 2025-03-18 SDOH — SOCIAL STABILITY: SOCIAL NETWORK: IN A TYPICAL WEEK, HOW MANY TIMES DO YOU TALK ON THE PHONE WITH FAMILY, FRIENDS, OR NEIGHBORS?: THREE TIMES A WEEK

## 2025-03-18 SDOH — SOCIAL STABILITY: SOCIAL NETWORK: HOW OFTEN DO YOU ATTEND CHURCH OR RELIGIOUS SERVICES?: 1 TO 4 TIMES PER YEAR

## 2025-03-18 SDOH — ECONOMIC STABILITY: HOUSING INSECURITY: IN THE PAST 12 MONTHS, HOW MANY TIMES HAVE YOU MOVED WHERE YOU WERE LIVING?: 0

## 2025-03-18 SDOH — SOCIAL STABILITY: SOCIAL INSECURITY: DOES ANYONE TRY TO KEEP YOU FROM HAVING/CONTACTING OTHER FRIENDS OR DOING THINGS OUTSIDE YOUR HOME?: NO

## 2025-03-18 SDOH — HEALTH STABILITY: PHYSICAL HEALTH: ON AVERAGE, HOW MANY DAYS PER WEEK DO YOU ENGAGE IN MODERATE TO STRENUOUS EXERCISE (LIKE A BRISK WALK)?: 3 DAYS

## 2025-03-18 SDOH — SOCIAL STABILITY: SOCIAL INSECURITY: HAS ANYONE EVER THREATENED TO HURT YOUR FAMILY OR YOUR PETS?: NO

## 2025-03-18 SDOH — SOCIAL STABILITY: SOCIAL INSECURITY
WITHIN THE LAST YEAR, HAVE YOU BEEN RAPED OR FORCED TO HAVE ANY KIND OF SEXUAL ACTIVITY BY YOUR PARTNER OR EX-PARTNER?: NO

## 2025-03-18 SDOH — SOCIAL STABILITY: SOCIAL INSECURITY: HAVE YOU HAD ANY THOUGHTS OF HARMING ANYONE ELSE?: NO

## 2025-03-18 SDOH — SOCIAL STABILITY: SOCIAL NETWORK: HOW OFTEN DO YOU GET TOGETHER WITH FRIENDS OR RELATIVES?: THREE TIMES A WEEK

## 2025-03-18 SDOH — ECONOMIC STABILITY: FOOD INSECURITY: HOW HARD IS IT FOR YOU TO PAY FOR THE VERY BASICS LIKE FOOD, HOUSING, MEDICAL CARE, AND HEATING?: NOT HARD AT ALL

## 2025-03-18 SDOH — ECONOMIC STABILITY: FOOD INSECURITY: WITHIN THE PAST 12 MONTHS, THE FOOD YOU BOUGHT JUST DIDN'T LAST AND YOU DIDN'T HAVE MONEY TO GET MORE.: NEVER TRUE

## 2025-03-18 SDOH — SOCIAL STABILITY: SOCIAL INSECURITY: ARE THERE ANY APPARENT SIGNS OF INJURIES/BEHAVIORS THAT COULD BE RELATED TO ABUSE/NEGLECT?: NO

## 2025-03-18 ASSESSMENT — COGNITIVE AND FUNCTIONAL STATUS - GENERAL
DAILY ACTIVITIY SCORE: 24
PATIENT BASELINE BEDBOUND: NO
CLIMB 3 TO 5 STEPS WITH RAILING: A LITTLE
MOBILITY SCORE: 22
MOBILITY SCORE: 24
DAILY ACTIVITIY SCORE: 24
WALKING IN HOSPITAL ROOM: A LITTLE
DAILY ACTIVITIY SCORE: 24
MOBILITY SCORE: 24

## 2025-03-18 ASSESSMENT — COLUMBIA-SUICIDE SEVERITY RATING SCALE - C-SSRS
6. HAVE YOU EVER DONE ANYTHING, STARTED TO DO ANYTHING, OR PREPARED TO DO ANYTHING TO END YOUR LIFE?: NO
2. HAVE YOU ACTUALLY HAD ANY THOUGHTS OF KILLING YOURSELF?: NO
2. HAVE YOU ACTUALLY HAD ANY THOUGHTS OF KILLING YOURSELF?: NO
1. IN THE PAST MONTH, HAVE YOU WISHED YOU WERE DEAD OR WISHED YOU COULD GO TO SLEEP AND NOT WAKE UP?: NO
6. HAVE YOU EVER DONE ANYTHING, STARTED TO DO ANYTHING, OR PREPARED TO DO ANYTHING TO END YOUR LIFE?: NO
1. IN THE PAST MONTH, HAVE YOU WISHED YOU WERE DEAD OR WISHED YOU COULD GO TO SLEEP AND NOT WAKE UP?: NO

## 2025-03-18 ASSESSMENT — PAIN - FUNCTIONAL ASSESSMENT
PAIN_FUNCTIONAL_ASSESSMENT: 0-10

## 2025-03-18 ASSESSMENT — ACTIVITIES OF DAILY LIVING (ADL)
HEARING - LEFT EAR: FUNCTIONAL
WALKS IN HOME: INDEPENDENT
JUDGMENT_ADEQUATE_SAFELY_COMPLETE_DAILY_ACTIVITIES: YES
GROOMING: INDEPENDENT
ADEQUATE_TO_COMPLETE_ADL: YES
BATHING: INDEPENDENT
TOILETING: INDEPENDENT
HEARING - RIGHT EAR: FUNCTIONAL
LACK_OF_TRANSPORTATION: NO
PATIENT'S MEMORY ADEQUATE TO SAFELY COMPLETE DAILY ACTIVITIES?: YES
DRESSING YOURSELF: INDEPENDENT
FEEDING YOURSELF: INDEPENDENT
ASSISTIVE_DEVICE: EYEGLASSES
LACK_OF_TRANSPORTATION: NO

## 2025-03-18 ASSESSMENT — LIFESTYLE VARIABLES
AUDIT-C TOTAL SCORE: 1
HOW OFTEN DO YOU HAVE A DRINK CONTAINING ALCOHOL: MONTHLY OR LESS
AUDIT-C TOTAL SCORE: 1
HOW OFTEN DO YOU HAVE 6 OR MORE DRINKS ON ONE OCCASION: NEVER
SKIP TO QUESTIONS 9-10: 1
SKIP TO QUESTIONS 9-10: 1
HOW MANY STANDARD DRINKS CONTAINING ALCOHOL DO YOU HAVE ON A TYPICAL DAY: PATIENT DOES NOT DRINK
AUDIT-C TOTAL SCORE: 1

## 2025-03-18 ASSESSMENT — PATIENT HEALTH QUESTIONNAIRE - PHQ9
2. FEELING DOWN, DEPRESSED OR HOPELESS: NOT AT ALL
SUM OF ALL RESPONSES TO PHQ9 QUESTIONS 1 & 2: 0
1. LITTLE INTEREST OR PLEASURE IN DOING THINGS: NOT AT ALL

## 2025-03-18 ASSESSMENT — PAIN SCALES - GENERAL
PAINLEVEL_OUTOF10: 0 - NO PAIN
PAINLEVEL_OUTOF10: 2
PAINLEVEL_OUTOF10: 0 - NO PAIN

## 2025-03-18 NOTE — CARE PLAN
The patient's goals for the shift include  remaining free from falls    The clinical goals for the shift include remain free from harm

## 2025-03-18 NOTE — CARE PLAN
The patient's goals for the shift include      The clinical goals for the shift include pt will remain hds      Problem: Pain - Adult  Goal: Verbalizes/displays adequate comfort level or baseline comfort level  Outcome: Progressing     Problem: Safety - Adult  Goal: Free from fall injury  Outcome: Progressing     Problem: Discharge Planning  Goal: Discharge to home or other facility with appropriate resources  Outcome: Progressing

## 2025-03-18 NOTE — CONSULTS
Reason For Consult  Anemia, dysphagia     History Of Present Illness  Paul Prather is a 74 y.o. male with h/o ADHD, HTN, CAD, depression, HLD, CKD, presented with anemia. On admission yesterday H&H 8.6 and 29.0, MCV 82, platelets 259, WBC 7.8, renal function at his baseline.  Noted with normal hemoglobin and 8/19/2024.  He did have a colonoscopy in 2/15/2024, 4 polyps removed, see report below.  No melena or hematochezia, reports dysphagia.  Stated for about a week he has trouble with swallowing, feels like food getting stuck in the low part of esophagus, associated with some odynophagia, denies acid reflux or heartburn.  Iron studies showed normal iron, low ferritin and percent saturation.  He takes aspirin 81 mg daily, Tylenol once a day, denies other NSAIDs.  Never had EGD.  Denies weight loss.    Colonoscopy 2/15/2024  Several nonbleeding diverticular in the entire colon, described as mild in severity; 4 polyps removed, biopsy showed tubular adenoma, internal hemorrhoids.     Past Medical History  He has a past medical history of Abnormal stress electrocardiogram test (08/07/2024), Arthritis of knee, left (07/25/2023), Coronary artery disease involving native coronary artery of native heart without angina pectoris (07/24/2024), Elevated coronary artery calcium score (07/24/2024), Other specified abnormal findings of blood chemistry, Other specified abnormal findings of blood chemistry (11/15/2022), SVT (supraventricular tachycardia) (CMS-HCC) (04/13/2023), and Tear of medial meniscus of left knee, current (07/25/2023).    Surgical History  He has a past surgical history that includes Cardiac catheterization (N/A, 8/26/2024).     Social History  He reports that he has quit smoking. His smoking use included cigarettes. He has never used smokeless tobacco. He reports current alcohol use. He reports that he does not use drugs.    Family History  Family History   Problem Relation Name Age of Onset    Other  "(arteriosclerotic vascular disease) Father          Allergies  Other and Sulfa (sulfonamide antibiotics)    Review of Systems  10 systems reviewed and negative other than HPI     Physical Exam  Physical Exam  Vitals reviewed.   Constitutional:       Appearance: Normal appearance.   HENT:      Head: Normocephalic and atraumatic.      Nose: Nose normal.      Mouth/Throat:      Mouth: Mucous membranes are dry.   Pulmonary:      Effort: Pulmonary effort is normal.      Breath sounds: Normal breath sounds.   Abdominal:      General: Bowel sounds are normal. There is no distension.      Palpations: Abdomen is soft.      Tenderness: There is no abdominal tenderness. There is no guarding.   Skin:     General: Skin is warm and dry.      Coloration: Skin is pale.   Neurological:      General: No focal deficit present.      Mental Status: He is alert and oriented to person, place, and time.   Psychiatric:         Mood and Affect: Mood normal.         Behavior: Behavior normal.            Last Recorded Vitals  Blood pressure 106/62, pulse 51, temperature 36.2 °C (97.2 °F), temperature source Temporal, resp. rate 16, height 1.778 m (5' 10\"), weight 86.6 kg (190 lb 14.7 oz), SpO2 95%.    Relevant Results      Scheduled medications  amphetamine-dextroamphetamine, 10 mg, oral, BID  atorvastatin, 40 mg, oral, Nightly  docusate sodium, 100 mg, oral, BID  [Held by provider] lisinopril, 10 mg, oral, Daily  metoprolol succinate XL, 25 mg, oral, Daily  pantoprazole, 40 mg, oral, Daily before breakfast  traZODone, 150 mg, oral, Nightly  venlafaxine XR, 150 mg, oral, Daily      Continuous medications     PRN medications  PRN medications: acetaminophen **OR** acetaminophen **OR** acetaminophen, cyclobenzaprine, ondansetron **OR** ondansetron, polyethylene glycol  CT head wo IV contrast    Result Date: 3/17/2025  Interpreted By:  Gopal Palumbo, STUDY: CT HEAD WO IV CONTRAST;  3/17/2025 2:20 pm   INDICATION: Signs/Symptoms:falls.     " COMPARISON: None.   ACCESSION NUMBER(S): [id number]   ORDERING CLINICIAN: JEM BLACK   TECHNIQUE: Contiguous unenhanced axial images were obtained through the brain.   FINDINGS: INTRACRANIAL: No acute intracranial bleed, midline shift, or mass effect is seen. Gray-white differentiation is maintained. No extra-axial fluid collection or hydrocephalus. Irregular atherosclerotic calcifications are seen in the internal carotid artery and vertebral artery segments.   Bones are intact.   EXTRACRANIAL: Visualized paranasal sinuses and mastoids are clear.       No acute intracranial process.       MACRO: None.   Signed by: Gopal Palumbo 3/17/2025 2:36 PM Dictation workstation:   [id number]    CT LUMBAR SPINE W RECON DATA    Result Date: 3/14/2025  * * *Final Report* * * DATE OF EXAM: Mar 14 2025  4:19PM   [facility]   0481  -  CT LUMBAR SPINE W RECON DATA -NB  / ACCESSION #  [id number] PROCEDURE REASON: Spine fracture, lumbar, traumatic      * * * * Physician Interpretation * * * *  EXAMINATION: CT LUMBAR SPINE W RECON DATA -NB, CT ABD/PEL W IVCON, CTA CHEST (NON GATED) W IVCON PE CLINICAL HISTORY: Lightheadedness and heart murmur with low back pain. TECHNIQUE: CT imaging of the chest, abdomen, and pelvis with IV contrast. Sagittal and coronal reformats generated in addition to MIPS and thin sections.  Dedicated small field-of-view reconstructions of lumbar spine generated. Contrast: IV:  100 ml of Omnipaque 350 CT Radiation dose: Integrated Dose-length product (DLP) for this visit =   1150 mGy*cm. CT Dose Reduction Employed: Iterative recon (accession [id number]), Automated exposure control (AEC) (accession [id number]), Automated exposure control (AEC) (accession [id number]) COMPARISON: CT dated 11/9/2022 and 6/18/2020 and radiograph dated 03/21/2022. FINDINGS: CTA Chest: There are no filling defects in the main pulmonary arteries, lobar, or segmental branches to suggest pulmonary embolic disease. The aorta and great vessels  are normal in caliber. Heart size is normal without pericardial effusion.  Atherosclerosis of coronary arteries.   There is no significant mediastinal, hilar, axillary or supraclavicular lymphadenopathy by size criteria. The tracheobronchial tree is patent. Lungs are without consolidation, pleural effusion, or pneumothorax bilaterally.  Dependent atelectasis bilaterally. CT abdomen and pelvis: Liver: Unremarkable size and enhancement without mass. Biliary system: No cholelithiasis, pericholecystic fluid, or gallbladder wall thickening.  No intrahepatic or extrahepatic biliary dilatation. Spleen: Unremarkable. Pancreas: Unremarkable. Adrenal glands: Unremarkable. Urinary system: Multiple simple appearing bilateral renal cyst with mild perinephric fat stranding.  No nephroureterolithiasis or hydroureteronephrosis.  Urinary bladder is unremarkable. Bowel: Stomach is unremarkable.  Small bowel and colon are without dilatation or wall thickening.   Appendix is unremarkable. Reproductive system: Unremarkable. Abdominal aorta: No abnormal dilatation or evidence of dissection.   Atherosclerosis abdominal aorta extending into bilateral iliac arteries. Lumbar spine: L2-L3 fixation with bilateral transpedicular screws at aortic cage and interbody cage at L5-S1 redemonstrated.  No significant listhesis.  Vertebral body heights are maintained.  No significant listhesis.  Osteophytic bridging left sacroiliac joint. Miscellaneous: No intraperitoneal free air, free fluid, or abnormal collection.    IMPRESSION: 1.  No CT evidence of pulmonary embolism. 2.  No acute pelvic or intra-abdominal findings. 3.  Postsurgical changes of lumbar spine similar to prior radiograph without acute radiographic abnormality. 4.  Atherosclerosis. : CARLEEN   Transcribe Date/Time: Mar 14 2025  4:33P Dictated by : STONEY BERMEO MD This examination was interpreted and the report reviewed and electronically signed by: STONEY BERMEO MD on Mar  14 2025  5:29PM  EST    CT abdomen pelvis w IV contrast    Result Date: 3/14/2025  * * *Final Report* * * DATE OF EXAM: Mar 14 2025  4:19PM   [facility]   0530  -  CT ABD/PEL W IVCON  / ACCESSION #  [id number] PROCEDURE REASON: Abdominal pain, acute, nonlocalized      * * * * Physician Interpretation * * * *  EXAMINATION: CT LUMBAR SPINE W RECON DATA -NB, CT ABD/PEL W IVCON, CTA CHEST (NON GATED) W IVCON PE CLINICAL HISTORY: Lightheadedness and heart murmur with low back pain. TECHNIQUE: CT imaging of the chest, abdomen, and pelvis with IV contrast. Sagittal and coronal reformats generated in addition to MIPS and thin sections.  Dedicated small field-of-view reconstructions of lumbar spine generated. Contrast: IV:  100 ml of Omnipaque 350 CT Radiation dose: Integrated Dose-length product (DLP) for this visit =   1150 mGy*cm. CT Dose Reduction Employed: Iterative recon (accession [id number]), Automated exposure control (AEC) (accession [id number]), Automated exposure control (AEC) (accession [id number]) COMPARISON: CT dated 11/9/2022 and 6/18/2020 and radiograph dated 03/21/2022. FINDINGS: CTA Chest: There are no filling defects in the main pulmonary arteries, lobar, or segmental branches to suggest pulmonary embolic disease. The aorta and great vessels are normal in caliber. Heart size is normal without pericardial effusion.  Atherosclerosis of coronary arteries.   There is no significant mediastinal, hilar, axillary or supraclavicular lymphadenopathy by size criteria. The tracheobronchial tree is patent. Lungs are without consolidation, pleural effusion, or pneumothorax bilaterally.  Dependent atelectasis bilaterally. CT abdomen and pelvis: Liver: Unremarkable size and enhancement without mass. Biliary system: No cholelithiasis, pericholecystic fluid, or gallbladder wall thickening.  No intrahepatic or extrahepatic biliary dilatation. Spleen: Unremarkable. Pancreas: Unremarkable. Adrenal glands: Unremarkable. Urinary system:  Multiple simple appearing bilateral renal cyst with mild perinephric fat stranding.  No nephroureterolithiasis or hydroureteronephrosis.  Urinary bladder is unremarkable. Bowel: Stomach is unremarkable.  Small bowel and colon are without dilatation or wall thickening.   Appendix is unremarkable. Reproductive system: Unremarkable. Abdominal aorta: No abnormal dilatation or evidence of dissection.   Atherosclerosis abdominal aorta extending into bilateral iliac arteries. Lumbar spine: L2-L3 fixation with bilateral transpedicular screws at aortic cage and interbody cage at L5-S1 redemonstrated.  No significant listhesis.  Vertebral body heights are maintained.  No significant listhesis.  Osteophytic bridging left sacroiliac joint. Miscellaneous: No intraperitoneal free air, free fluid, or abnormal   collection.    IMPRESSION: 1.  No CT evidence of pulmonary embolism. 2.  No acute pelvic or intra-abdominal findings. 3.  Postsurgical changes of lumbar spine similar to prior radiograph without acute radiographic abnormality. 4.  Atherosclerosis. : CARLEEN   Transcribe Date/Time: Mar 14 2025  4:33P Dictated by : STOENY BERMEO MD This examination was interpreted and the report reviewed and electronically signed by: STONEY BERMEO MD on Mar 14 2025  5:29PM  EST    CT angio chest w and wo IV contrast    Result Date: 3/14/2025  * * *Final Report* * * DATE OF EXAM: Mar 14 2025  4:19PM   [facility]   0564  -  CTA CHEST (NON GATED) W IVCON PE  / ACCESSION #  [id number] PROCEDURE REASON: Pulmonary embolism (PE) suspected, high prob      * * * * Physician Interpretation * * * *   EXAMINATION: CT LUMBAR SPINE W RECON DATA -NB, CT ABD/PEL W IVCON, CTA CHEST (NON GATED) W IVCON PE CLINICAL HISTORY: Lightheadedness and heart murmur with low back pain. TECHNIQUE: CT imaging of the chest, abdomen, and pelvis with IV contrast. Sagittal and coronal reformats generated in addition to MIPS and thin sections.  Dedicated small  field-of-view reconstructions of lumbar spine generated. Contrast: IV:  100 ml of Omnipaque 350 CT Radiation dose: Integrated Dose-length product (DLP) for this visit =   1150 mGy*cm. CT Dose Reduction Employed: Iterative recon (accession [id number]), Automated exposure control (AEC) (accession [id number]), Automated exposure control (AEC) (accession [id number]) COMPARISON: CT dated 11/9/2022 and 6/18/2020 and radiograph dated 03/21/2022. FINDINGS: CTA Chest: There are no filling defects in the main pulmonary arteries, lobar, or segmental branches to suggest pulmonary embolic disease. The aorta and great vessels are normal in caliber. Heart size is normal without pericardial effusion.  Atherosclerosis of coronary arteries.   There is no significant mediastinal, hilar, axillary or supraclavicular lymphadenopathy by size criteria. The tracheobronchial tree is patent. Lungs are without consolidation, pleural effusion, or pneumothorax bilaterally.  Dependent atelectasis bilaterally. CT abdomen and pelvis: Liver: Unremarkable size and enhancement without mass. Biliary system: No cholelithiasis, pericholecystic fluid, or gallbladder wall thickening.  No intrahepatic or extrahepatic biliary dilatation. Spleen: Unremarkable. Pancreas: Unremarkable. Adrenal glands: Unremarkable. Urinary system: Multiple simple appearing bilateral renal cyst with mild perinephric fat stranding.  No nephroureterolithiasis or hydroureteronephrosis.  Urinary bladder is unremarkable. Bowel: Stomach is unremarkable.  Small bowel and colon are without dilatation or wall thickening.   Appendix is unremarkable. Reproductive system: Unremarkable. Abdominal aorta: No abnormal dilatation or evidence of dissection.   Atherosclerosis abdominal aorta extending into bilateral iliac arteries. Lumbar spine: L2-L3 fixation with bilateral transpedicular screws at aortic cage and interbody cage at L5-S1 redemonstrated.  No significant listhesis.  Vertebral body  heights are maintained.  No significant listhesis.  Osteophytic bridging left sacroiliac joint. Miscellaneous: No intraperitoneal free air, free fluid, or abnormal collection.    IMPRESSION: 1.  No CT evidence of pulmonary embolism. 2.  No acute pelvic or intra-abdominal findings. 3.  Postsurgical changes of lumbar spine similar to prior radiograph without acute radiographic abnormality. 4.  Atherosclerosis. : CARLEEN   Transcribe Date/Time: Mar 14 2025  4:33P Dictated by : STONEY BERMEO MD This examination was interpreted and the report reviewed and electronically signed by: STONEY BERMEO MD on Mar 14 2025  5:29PM  EST   Results for orders placed or performed during the hospital encounter of 03/17/25 (from the past 24 hours)   CBC and Auto Differential   Result Value Ref Range    WBC 7.6 4.4 - 11.3 x10*3/uL    nRBC 0.0 0.0 - 0.0 /100 WBCs    RBC 3.52 (L) 4.50 - 5.90 x10*6/uL    Hemoglobin 8.6 (L) 13.5 - 17.5 g/dL    Hematocrit 29.0 (L) 41.0 - 52.0 %    MCV 82 80 - 100 fL    MCH 24.4 (L) 26.0 - 34.0 pg    MCHC 29.7 (L) 32.0 - 36.0 g/dL    RDW 16.3 (H) 11.5 - 14.5 %    Platelets 259 150 - 450 x10*3/uL    Neutrophils % 73.3 40.0 - 80.0 %    Immature Granulocytes %, Automated 0.7 0.0 - 0.9 %    Lymphocytes % 16.2 13.0 - 44.0 %    Monocytes % 8.5 2.0 - 10.0 %    Eosinophils % 1.2 0.0 - 6.0 %    Basophils % 0.1 0.0 - 2.0 %    Neutrophils Absolute 5.54 (H) 1.60 - 5.50 x10*3/uL    Immature Granulocytes Absolute, Automated 0.05 0.00 - 0.50 x10*3/uL    Lymphocytes Absolute 1.22 0.80 - 3.00 x10*3/uL    Monocytes Absolute 0.64 0.05 - 0.80 x10*3/uL    Eosinophils Absolute 0.09 0.00 - 0.40 x10*3/uL    Basophils Absolute 0.01 0.00 - 0.10 x10*3/uL   Basic metabolic panel   Result Value Ref Range    Glucose 83 74 - 99 mg/dL    Sodium 138 136 - 145 mmol/L    Potassium 4.6 3.5 - 5.3 mmol/L    Chloride 108 (H) 98 - 107 mmol/L    Bicarbonate 27 21 - 32 mmol/L    Anion Gap 8 (L) 10 - 20 mmol/L    Urea Nitrogen 36 (H) 6 - 23  mg/dL    Creatinine 1.68 (H) 0.50 - 1.30 mg/dL    eGFR 42 (L) >60 mL/min/1.73m*2    Calcium 9.0 8.6 - 10.3 mg/dL   Hepatic function panel   Result Value Ref Range    Albumin 4.1 3.4 - 5.0 g/dL    Bilirubin, Total 0.3 0.0 - 1.2 mg/dL    Bilirubin, Direct 0.0 0.0 - 0.3 mg/dL    Alkaline Phosphatase 96 33 - 136 U/L    ALT 18 10 - 52 U/L    AST 19 9 - 39 U/L    Total Protein 6.8 6.4 - 8.2 g/dL   Lipase   Result Value Ref Range    Lipase 51 9 - 82 U/L   Type And Screen   Result Value Ref Range    ABO TYPE O     Rh TYPE POS     ANTIBODY SCREEN NEG    B-Type Natriuretic Peptide   Result Value Ref Range    BNP 56 0 - 99 pg/mL   Lactate Dehydrogenase   Result Value Ref Range     84 - 246 U/L   Iron and TIBC   Result Value Ref Range    Iron 36 35 - 150 ug/dL    UIBC 363 110 - 370 ug/dL    TIBC 399 240 - 445 ug/dL    % Saturation 9 (L) 25 - 45 %   Ferritin   Result Value Ref Range    Ferritin 14 (L) 20 - 300 ng/mL   Protime-INR   Result Value Ref Range    Protime 11.9 9.8 - 12.4 seconds    INR 1.1 0.9 - 1.1   Occult Blood, Stool    Specimen: Stool   Result Value Ref Range    Occult Blood, Stool X1 Negative Negative   Folate   Result Value Ref Range    Folate, Serum >24.0 >5.0 ng/mL   Vitamin B12   Result Value Ref Range    Vitamin B12 399 211 - 911 pg/mL   Reticulocytes   Result Value Ref Range    Retic % 1.9 0.5 - 2.0 %    Retic Absolute 0.059 0.017 - 0.110 x10*6/uL    Reticulocyte Hemoglobin 23 (L) 28 - 38 pg    Immature Retic fraction 25.8 (H) <=16.0 %   Basic Metabolic Panel   Result Value Ref Range    Glucose 77 74 - 99 mg/dL    Sodium 138 136 - 145 mmol/L    Potassium 4.5 3.5 - 5.3 mmol/L    Chloride 108 (H) 98 - 107 mmol/L    Bicarbonate 27 21 - 32 mmol/L    Anion Gap 8 (L) 10 - 20 mmol/L    Urea Nitrogen 33 (H) 6 - 23 mg/dL    Creatinine 1.44 (H) 0.50 - 1.30 mg/dL    eGFR 51 (L) >60 mL/min/1.73m*2    Calcium 8.3 (L) 8.6 - 10.3 mg/dL   Magnesium   Result Value Ref Range    Magnesium 2.06 1.60 - 2.40 mg/dL   CBC  and Auto Differential   Result Value Ref Range    WBC 8.2 4.4 - 11.3 x10*3/uL    nRBC 0.0 0.0 - 0.0 /100 WBCs    RBC 3.09 (L) 4.50 - 5.90 x10*6/uL    Hemoglobin 7.5 (L) 13.5 - 17.5 g/dL    Hematocrit 25.2 (L) 41.0 - 52.0 %    MCV 82 80 - 100 fL    MCH 24.3 (L) 26.0 - 34.0 pg    MCHC 29.8 (L) 32.0 - 36.0 g/dL    RDW 16.0 (H) 11.5 - 14.5 %    Platelets 216 150 - 450 x10*3/uL    Neutrophils % 71.7 40.0 - 80.0 %    Immature Granulocytes %, Automated 0.5 0.0 - 0.9 %    Lymphocytes % 17.8 13.0 - 44.0 %    Monocytes % 8.3 2.0 - 10.0 %    Eosinophils % 1.6 0.0 - 6.0 %    Basophils % 0.1 0.0 - 2.0 %    Neutrophils Absolute 5.88 (H) 1.60 - 5.50 x10*3/uL    Immature Granulocytes Absolute, Automated 0.04 0.00 - 0.50 x10*3/uL    Lymphocytes Absolute 1.46 0.80 - 3.00 x10*3/uL    Monocytes Absolute 0.68 0.05 - 0.80 x10*3/uL    Eosinophils Absolute 0.13 0.00 - 0.40 x10*3/uL    Basophils Absolute 0.01 0.00 - 0.10 x10*3/uL   Basic metabolic panel   Result Value Ref Range    Glucose 74 74 - 99 mg/dL    Sodium 140 136 - 145 mmol/L    Potassium 4.7 3.5 - 5.3 mmol/L    Chloride 110 (H) 98 - 107 mmol/L    Bicarbonate 26 21 - 32 mmol/L    Anion Gap 9 (L) 10 - 20 mmol/L    Urea Nitrogen 32 (H) 6 - 23 mg/dL    Creatinine 1.53 (H) 0.50 - 1.30 mg/dL    eGFR 47 (L) >60 mL/min/1.73m*2    Calcium 8.0 (L) 8.6 - 10.3 mg/dL   CBC and Auto Differential   Result Value Ref Range    WBC 7.2 4.4 - 11.3 x10*3/uL    nRBC 0.0 0.0 - 0.0 /100 WBCs    RBC 2.94 (L) 4.50 - 5.90 x10*6/uL    Hemoglobin 7.1 (L) 13.5 - 17.5 g/dL    Hematocrit 24.0 (L) 41.0 - 52.0 %    MCV 82 80 - 100 fL    MCH 24.1 (L) 26.0 - 34.0 pg    MCHC 29.6 (L) 32.0 - 36.0 g/dL    RDW 16.0 (H) 11.5 - 14.5 %    Platelets 206 150 - 450 x10*3/uL    Neutrophils % 69.5 40.0 - 80.0 %    Immature Granulocytes %, Automated 0.4 0.0 - 0.9 %    Lymphocytes % 18.4 13.0 - 44.0 %    Monocytes % 9.8 2.0 - 10.0 %    Eosinophils % 1.8 0.0 - 6.0 %    Basophils % 0.1 0.0 - 2.0 %    Neutrophils Absolute 4.98 1.60  - 5.50 x10*3/uL    Immature Granulocytes Absolute, Automated 0.03 0.00 - 0.50 x10*3/uL    Lymphocytes Absolute 1.32 0.80 - 3.00 x10*3/uL    Monocytes Absolute 0.70 0.05 - 0.80 x10*3/uL    Eosinophils Absolute 0.13 0.00 - 0.40 x10*3/uL    Basophils Absolute 0.01 0.00 - 0.10 x10*3/uL          Assessment/Plan     74 y.o. male with h/o ADHD, HTN, CAD, depression, HLD, CKD, presented with acute normocytic anemia, low ferritin and percent saturation, no evidence of overt bleeding (melena, hematochezia, hematemesis, hematuria).  He reports 1 week history of dysphagia, difficulty passing the food and epigastric discomfort.    # Acute anemia, iron deficiency, need to rule out PUD, gastritis, esophagitis, occult malignancy, small bowel AVM.  Bone marrow dysfunction also on differentials for    # Dysphagia, likely esophageal, could be esophagitis, PUD, occult GI malignancy    -Continue PPI  -N.p.o.  -Plan for EGD today.  -Further recommendation pending EGD results      OLIVIA Valdovinos-CNP

## 2025-03-18 NOTE — PROGRESS NOTES
03/18/25 1407   Discharge Planning   Living Arrangements Spouse/significant other   Support Systems Spouse/significant other   Type of Residence Private residence   Number of Stairs to Enter Residence 2   Do you have animals or pets at home? Yes   Type of Animals or Pets cat   Home or Post Acute Services None   Expected Discharge Disposition Home   Financial Resource Strain   How hard is it for you to pay for the very basics like food, housing, medical care, and heating? Not hard   Housing Stability   In the last 12 months, was there a time when you were not able to pay the mortgage or rent on time? N   At any time in the past 12 months, were you homeless or living in a shelter (including now)? N   Transportation Needs   In the past 12 months, has lack of transportation kept you from medical appointments or from getting medications? no   In the past 12 months, has lack of transportation kept you from meetings, work, or from getting things needed for daily living? No     Plan per Medical/Surgical team: orthos, ua w culture, GI, clear liquid diet, EGD today  Status: observation  Payor source: [employer]  Discharge disposition: Home  Potential Barriers:   ADOD: 3/19/25

## 2025-03-18 NOTE — ANESTHESIA PREPROCEDURE EVALUATION
Patient: Paul Prather    Procedure Information       Date/Time: 03/18/25 1430    Scheduled providers: Hawa Dunn MD, MS; Johnathan Alvarado MD; William Casiano, RN; Regi Grier, RN    Procedure: EGD    Location: [facility]            Relevant Problems   Anesthesia (within normal limits)      Cardiac   (+) Abnormal EKG   (+) Aortic valve regurgitation, acquired   (+) Coronary artery disease involving native coronary artery of native heart without angina pectoris   (+) HTN (hypertension), benign   (+) SVT (supraventricular tachycardia) (CMS-HCC)      Pulmonary (within normal limits)      Neuro   (+) Acute sciatica   (+) Bilateral sciatica   (+) Depression, major, in remission (CMS-[facility])   (+) Generalized anxiety disorder with panic attacks      GI (within normal limits)      /Renal (within normal limits)      Liver (within normal limits)      Endocrine (within normal limits)      Hematology   (+) Anemia      Musculoskeletal (within normal limits)      HEENT   (+) Primary open angle glaucoma (POAG) of both eyes, mild stage      ID (within normal limits)      Skin (within normal limits)       Clinical information reviewed:   Tobacco  Allergies  Meds   Med Hx  Surg Hx   Fam Hx  Soc Hx        NPO Detail:  NPO/Void Status  Carbohydrate Drink Given Prior to Surgery? : N  Date of Last Liquid: 03/18/25  Time of Last Liquid: 0000  Date of Last Solid: 03/17/25  Time of Last Solid: 1900  Last Intake Type: Clear fluids  Time of Last Void: 0943         Physical Exam    Airway  Mallampati: II  TM distance: >3 FB  Neck ROM: full     Cardiovascular - normal exam     Dental - normal exam     Pulmonary - normal exam     Abdominal - normal exam         Anesthesia Plan    History of general anesthesia?: yes  History of complications of general anesthesia?: no    ASA 3     general     The patient is not a current smoker.    intravenous induction   Anesthetic plan and risks discussed with patient.  Use of blood  products discussed with patient who consented to blood products.

## 2025-03-18 NOTE — ANESTHESIA POSTPROCEDURE EVALUATION
Patient: Paul Prather    Procedure Summary       Date: 03/18/25 Room / Location: [facility]    Anesthesia Start: 0959 Anesthesia Stop: 1037    Procedure: EGD Diagnosis:       Anemia, unspecified type      Dysphagia, unspecified type    Scheduled Providers: Hawa Dunn MD, MS; Johnathan Alvarado MD; William Casiano, RN; Regi Grier RN Responsible Provider: Johnathan Alvarado MD    Anesthesia Type: general ASA Status: 3            Anesthesia Type: general    Vitals Value Taken Time   /70 03/18/25 1104   Temp 36.6 °C (97.9 °F) 03/18/25 1034   Pulse 54 03/18/25 1104   Resp 18 03/18/25 1104   SpO2 100 % 03/18/25 1104       Anesthesia Post Evaluation    Patient location during evaluation: bedside  Patient participation: complete - patient participated  Level of consciousness: awake and alert  Pain management: adequate  Airway patency: patent  Cardiovascular status: acceptable  Respiratory status: acceptable  Hydration status: acceptable  Postoperative Nausea and Vomiting: none    No notable events documented.

## 2025-03-18 NOTE — CARE PLAN
The patient's goals for the shift include  remaining free from harm    The clinical goals for the shift include remain free from harm

## 2025-03-19 VITALS
HEIGHT: 70 IN | BODY MASS INDEX: 27.33 KG/M2 | TEMPERATURE: 98.4 F | DIASTOLIC BLOOD PRESSURE: 80 MMHG | WEIGHT: 190.92 LBS | HEART RATE: 57 BPM | OXYGEN SATURATION: 96 % | SYSTOLIC BLOOD PRESSURE: 126 MMHG | RESPIRATION RATE: 17 BRPM

## 2025-03-19 LAB
ANION GAP SERPL CALC-SCNC: 9 MMOL/L (ref 10–20)
BASOPHILS # BLD AUTO: 0.01 X10*3/UL (ref 0–0.1)
BASOPHILS NFR BLD AUTO: 0.1 %
BUN SERPL-MCNC: 24 MG/DL (ref 6–23)
CALCIUM SERPL-MCNC: 8.6 MG/DL (ref 8.6–10.3)
CHLORIDE SERPL-SCNC: 108 MMOL/L (ref 98–107)
CO2 SERPL-SCNC: 26 MMOL/L (ref 21–32)
CREAT SERPL-MCNC: 1.41 MG/DL (ref 0.5–1.3)
EGFRCR SERPLBLD CKD-EPI 2021: 52 ML/MIN/1.73M*2
EOSINOPHIL # BLD AUTO: 0.07 X10*3/UL (ref 0–0.4)
EOSINOPHIL NFR BLD AUTO: 0.8 %
ERYTHROCYTE [DISTWIDTH] IN BLOOD BY AUTOMATED COUNT: 16.1 % (ref 11.5–14.5)
GLUCOSE SERPL-MCNC: 78 MG/DL (ref 74–99)
HCT VFR BLD AUTO: 24.8 % (ref 41–52)
HGB BLD-MCNC: 7.5 G/DL (ref 13.5–17.5)
IMM GRANULOCYTES # BLD AUTO: 0.06 X10*3/UL (ref 0–0.5)
IMM GRANULOCYTES NFR BLD AUTO: 0.7 % (ref 0–0.9)
LYMPHOCYTES # BLD AUTO: 0.95 X10*3/UL (ref 0.8–3)
LYMPHOCYTES NFR BLD AUTO: 11.2 %
MCH RBC QN AUTO: 24.3 PG (ref 26–34)
MCHC RBC AUTO-ENTMCNC: 30.2 G/DL (ref 32–36)
MCV RBC AUTO: 80 FL (ref 80–100)
MONOCYTES # BLD AUTO: 0.82 X10*3/UL (ref 0.05–0.8)
MONOCYTES NFR BLD AUTO: 9.6 %
NEUTROPHILS # BLD AUTO: 6.61 X10*3/UL (ref 1.6–5.5)
NEUTROPHILS NFR BLD AUTO: 77.6 %
NRBC BLD-RTO: 0 /100 WBCS (ref 0–0)
PLATELET # BLD AUTO: 234 X10*3/UL (ref 150–450)
POTASSIUM SERPL-SCNC: 4.1 MMOL/L (ref 3.5–5.3)
RBC # BLD AUTO: 3.09 X10*6/UL (ref 4.5–5.9)
SODIUM SERPL-SCNC: 139 MMOL/L (ref 136–145)
WBC # BLD AUTO: 8.5 X10*3/UL (ref 4.4–11.3)

## 2025-03-19 PROCEDURE — 99232 SBSQ HOSP IP/OBS MODERATE 35: CPT

## 2025-03-19 PROCEDURE — 99232 SBSQ HOSP IP/OBS MODERATE 35: CPT | Performed by: INTERNAL MEDICINE

## 2025-03-19 PROCEDURE — 2500000004 HC RX 250 GENERAL PHARMACY W/ HCPCS (ALT 636 FOR OP/ED)

## 2025-03-19 PROCEDURE — 92526 ORAL FUNCTION THERAPY: CPT | Mod: GN

## 2025-03-19 PROCEDURE — 99239 HOSP IP/OBS DSCHRG MGMT >30: CPT | Performed by: INTERNAL MEDICINE

## 2025-03-19 PROCEDURE — 80048 BASIC METABOLIC PNL TOTAL CA: CPT | Performed by: NURSE PRACTITIONER

## 2025-03-19 PROCEDURE — 92610 EVALUATE SWALLOWING FUNCTION: CPT | Mod: GN

## 2025-03-19 PROCEDURE — 36415 COLL VENOUS BLD VENIPUNCTURE: CPT | Performed by: NURSE PRACTITIONER

## 2025-03-19 PROCEDURE — 2500000001 HC RX 250 WO HCPCS SELF ADMINISTERED DRUGS (ALT 637 FOR MEDICARE OP)

## 2025-03-19 PROCEDURE — 85025 COMPLETE CBC W/AUTO DIFF WBC: CPT | Performed by: NURSE PRACTITIONER

## 2025-03-19 PROCEDURE — 2500000001 HC RX 250 WO HCPCS SELF ADMINISTERED DRUGS (ALT 637 FOR MEDICARE OP): Performed by: NURSE PRACTITIONER

## 2025-03-19 RX ADMIN — DEXTROAMPHETAMINE SACCHARATE, AMPHETAMINE ASPARTATE, DEXTROAMPHETAMINE SULFATE AND AMPHETAMINE SULFATE 10 MG: 2.5; 2.5; 2.5; 2.5 TABLET ORAL at 09:14

## 2025-03-19 RX ADMIN — DOCUSATE SODIUM 100 MG: 100 CAPSULE, LIQUID FILLED ORAL at 09:14

## 2025-03-19 RX ADMIN — METOPROLOL SUCCINATE 25 MG: 25 TABLET, EXTENDED RELEASE ORAL at 09:14

## 2025-03-19 RX ADMIN — PANTOPRAZOLE SODIUM 40 MG: 40 INJECTION, POWDER, FOR SOLUTION INTRAVENOUS at 09:24

## 2025-03-19 RX ADMIN — DEXTROAMPHETAMINE SACCHARATE, AMPHETAMINE ASPARTATE, DEXTROAMPHETAMINE SULFATE AND AMPHETAMINE SULFATE 10 MG: 2.5; 2.5; 2.5; 2.5 TABLET ORAL at 14:14

## 2025-03-19 ASSESSMENT — PAIN SCALES - GENERAL
PAINLEVEL_OUTOF10: 0 - NO PAIN

## 2025-03-19 ASSESSMENT — COGNITIVE AND FUNCTIONAL STATUS - GENERAL
CLIMB 3 TO 5 STEPS WITH RAILING: A LITTLE
DAILY ACTIVITIY SCORE: 24
WALKING IN HOSPITAL ROOM: A LITTLE
MOBILITY SCORE: 22

## 2025-03-19 ASSESSMENT — PAIN - FUNCTIONAL ASSESSMENT: PAIN_FUNCTIONAL_ASSESSMENT: 0-10

## 2025-03-19 NOTE — CARE PLAN
The patient's goals for the shift include to feel better, ready to go home    The clinical goals for the shift include will remain free from falls        Problem: Safety - Adult  Goal: Free from fall injury  Outcome: Progressing

## 2025-03-19 NOTE — PROGRESS NOTES
"Medicine PA follow up note    Subjective:  Patient sitting comfortably in bed. Discussed results of EGD with patient and wife at bedside as did Dr. Dunn from GI.     Vitals (Last 24 Hours):  Heart Rate:  [51-60]   Temp:  [35.8 °C (96.4 °F)-36.6 °C (97.9 °F)]   Resp:  [14-18]   BP: (105-127)/(50-74)   Height:  [177.8 cm (5' 10\")]   Weight:  [86.6 kg (190 lb 14.7 oz)]   SpO2:  [94 %-100 %]       I have reviewed all imaging reports and labs pertinent to this visit / presenting problem    PHYSICAL EXAM:  Constitutional: NAD, alert and cooperative  Eyes: no icterus  ENMT: mucous membranes moist, no lesions  Head/Neck: supple  Respiratory/Thorax: CTA bilaterally, non-labored breathing, no cough, on RA  Cardiovascular: RRR, no murmurs heard  Gastrointestinal: ND/S/NT  : no Smiley, no SP/flank discomfort  Musculoskeletal: no joint swelling, ROM intact, mildly TTP right/left lower back   Extremities: no edema  Neurological: non-focal  Skin: warm and dry  Psych: calm, stable mood     MEDS:  Scheduled meds  amphetamine-dextroamphetamine, 10 mg, oral, BID  atorvastatin, 40 mg, oral, Nightly  docusate sodium, 100 mg, oral, BID  [Held by provider] lisinopril, 10 mg, oral, Daily  metoprolol succinate XL, 25 mg, oral, Daily  pantoprazole, 40 mg, intravenous, BID  traZODone, 150 mg, oral, Nightly  venlafaxine XR, 150 mg, oral, Nightly        Continuous meds       PRN meds  PRN medications: acetaminophen **OR** acetaminophen **OR** acetaminophen, cyclobenzaprine, ondansetron **OR** ondansetron, polyethylene glycol      ASSESSMENT/PLAN:  Paul Prather is a 74 y.o. male with PMH of ADHD, HTN, CAD, depression, HLD, CKD presenting to the hospital for abnormal blood counts.  Patient states he has been experiencing lightheadedness since September of 2023. He states he has never lost consciousness, but he has gone down on all fours until the lightheadedness has passed. Additionally, patient endorses difficultly swallowing that has been " going on for approximately a week. He reports the sensation like the food is getting stuck when he eats. Endorses he hasn't had a bowel movement X 3 days. Denies fever, chills, chest pain, SOB, abdominal pain, nausea, vomiting, hematemesis, melena, hematochezia, hematuria.     Near syncope  Anemia  Dysphagia  -suspect dizziness 2/2 to new onset anemia  -patient endorses difficultly swallowing x 1 week  -hgb 8/19/24 14.5   -hgb 8.1 at [facility]  -hgb 8.6 on admit, hgb today 7.1, recheck 7.6  -IV venofer 200 mg   -tele  -orthos  -occult stool negative  -denies any black or overt blood in stool   -EGD performed today showing single fungating, polypoid and ulcerated mass measuring 40 mm x 15 mm extending from lower third of the esophagus, to proximal gastric cardia involving the GE junction, covering one quarter of the circumference   -GI recommendations, await path results, EUS, CT chest/abdomen/pelvis w/ IV contrast which pt already had done on 3/14  -pt to start with full liquids and advance diet as tolerated, pt was eating and drinking normally prior to admission  -SLP consult to further evaluate     Back Pain  -Patient endorsed pain after lifting 40 pound bag of dirt  -CT lumbar spine: Postsurgical changes of lumbar spine similar to prior radiograph   without acute radiographic abnormality.   -Flexeril 5 mg TID prn  -tylenol 650 mg q 4 hrs prn     CKD  -baseline creat labile from 1.4-1.7. Was 1.54 3/14/25   -creat on admit 1.68, improved to 1.53 today  -avoid nephrotoxic agents     HTN  -lisinopril on hold due to intermittent soft bps, revaluate in am whether can be unheld      HLD  -Continue atorvastatin 40 mg nightly      Depression  -Continue venlafaxine 150 mg daily      ADHD  -Continue Adderall 10 mg BID    Other comorbidities as above  -continue medications as ordered and adjust based on clinical course     VTE / GI prophylaxis   -low risk, encourage ambulation, PPI, bowel regimen in place     Discharge  planning  -HNN when medically ready    Discussed with Dr. Moise and the interdisciplinary team     Marisel Guo PA-C

## 2025-03-19 NOTE — DISCHARGE SUMMARY
Discharge Diagnosis  Anemia    Issues Requiring Follow-Up  PCP, medical oncology, and thoracic surgery.    Discharge Meds     Medication List      CONTINUE taking these medications     amphetamine-dextroamphetamine XR 20 mg 24 hr capsule; Commonly known as:   Adderall XR; Take 1 capsule (20 mg) by mouth once daily in the morning. Do   not crush or chew.   atorvastatin 40 mg tablet; Commonly known as: Lipitor; TAKE 1 TABLET BY   MOUTH ONCE DAILY   CENTRUM ADULTS ORAL   cetirizine 10 mg tablet; Commonly known as: ZyrTEC; Take 1 tablet (10   mg) by mouth once daily.   clonazePAM 0.5 mg tablet; Commonly known as: KlonoPIN; Take 1 tablet   (0.5 mg) by mouth every 12 hours if needed for anxiety.   latanoprost 0.005 % ophthalmic solution; Commonly known as: Xalatan;   Administer 1 drop into both eyes once daily at bedtime.   lisinopril 10 mg tablet; Take 1 tablet (10 mg) by mouth once daily.   metoprolol succinate XL 25 mg 24 hr tablet; Commonly known as:   Toprol-XL; Take 1 tablet (25 mg) by mouth once daily.   traZODone 150 mg tablet; Commonly known as: Desyrel; Take 1 tablet (150   mg) by mouth once daily at bedtime.   venlafaxine  mg 24 hr capsule; Commonly known as: Effexor-XR; Take   1 capsule (150 mg) by mouth once daily.       Test Results Pending At Discharge  Pending Labs       No current pending labs.            Hospital Course  Paul Prather is a 74 y.o. male with PMH of ADHD, HTN, CAD, depression, HLD, CKD presenting to the hospital for abnormal blood counts. Patient reports he went to an urgent care on Friday due to pain in his right lower back. He states this pain came on after lifting a 40 pound bag of dirt. He reports the urgent care told him his back was okay, but they heard a heart murmur and told him to go to the emergency room. The nearest emergency room to patient is [facility] so he presented there. Patient states Lore Johnson told him they did not hear a heart murmur; however, his blood  counts are low, but with it being Saturday they were unable to do any scopes until Monday. After discussing with Lore Johnson, patient states the hospital told him he was safe to return home as scopes would not be done on the weekend. Patient reports talking to his primary care doctor today who urged him to come to a  hospital as his primary doctor is within the  system. Patient states he has been experiencing lightheadedness since September of 2023. He reports the lightheadedness has violetta going on ever since intermittently. He states he has never lost consciousness, but he has gone down on all fours until the lightheadedness has passed. Additionally, patient endorses difficultly swallowing that has been going on for approximately a week. He reports the sensation like the food is getting stuck when he eats. Endorses he hasn't had a bowel movement X 3 days. Denies fever, chills, chest pain, SOB, abdominal pain, nausea, vomiting, hematemesis, melena, hematochezia, hematuria.     Patient was admitted to observation initially for further workup, with consultation of GI, and transferred to hospital service as inpatient. GI proceeded with EGD showing:   Single fungating, polypoid and ulcerated mass measuring 40 mm x 15 mm extending from lower third of the esophagus, to proximal gastric cardia involving the GE junction, covering one quarter of the circumference; bleeding occurred before intervention; performed cold forceps biopsy with partial removal; injected 3.5 mL of epinephrine to address bleeding; hemostasis achieved   Biopsy of the GE junction mass showed invasive moderately differentiated adenocarcinoma, predominantly foveolar type.  Patient was notified of biopsy results.  Recommend op follow up with medical and radiation oncology.  Patient also presented with acute anemia, without low enough hemoglobin requiring transfusion, therefore recommend PCP to order repeat CBC to assess for stability of hemoglobin  level.  Patient will also benefit from a BMP to monitor renal functions.  Discussed in length new diagnosis of esophageal adenocarcinoma, patient was eager to know how soon appointment with medical oncology will be established, had several question unfortunately too soon to be able to answer regarding stage, therapy, survival rate.  Informed him that more work will be done including immunochemistry, to determine the best course of action.  Patient found stable for discharge home with follow-up with PCP, referral made for medical oncology and thoracic surgery.  Appreciated input from oncology on-call for [facility], Dr. Geneva Bojorquez, [place] [facility] [place] for services to follow-up with.    Greater than 30 minutes were dedicated to this discharge that included lengthy discussion with new diagnosis of adenocarcinoma of the esophagus, and coordinating care for referral to appropriate services.  Pertinent Physical Exam At Time of Discharge  Physical Exam  Constitutional: Pleasant gentleman, but apprehensive about new diagnosis, alert active, cooperative not in acute distress  Eyes: PERRLA, clear sclera  ENMT: Moist mucosal membranes, no exudate  Head / Neck: Atraumatic, normocephalic, supple neck, JVP not visualized  Lungs: Patent airways, CTABL  Heart: RRR, S1S2, no murmurs appreciated, palpable pulses in all extremities  GI: Soft, NT, ND, bowel sounds present in all quadrants  MSK: Moves all extremities freely, no restriction  of ROM, no joint edema  Extremities: Intact x 4, no peripheral edema  : No Smiley catheter inserted  Breast: Deferred  Neurological: AAO x 3 to person, place and date, facial muscles symmetrical, sensation intact, strength 4/4, no acute focal neurological deficits appreciated  Psychological: Appropriate mood and behavior    Outpatient Follow-Up  Future Appointments   Date Time Provider Department Center   3/25/2025 10:00 AM Yefri Dumont MD  [id number] None   3/26/2025 10:00 AM James C Ramicone, DO [id number] [facility]   4/22/2025  4:30 PM Aayush Lopez MD [id number] [facility]   7/24/2025  2:30 PM Susan L Mayne, APRN-CNP [id number] [facility]   8/1/2025 11:00 AM Dilcia Barnard APRN-CNP [id number] [facility] [place] DO

## 2025-03-19 NOTE — PROGRESS NOTES
GI Daily Progress Note    Assessment/Plan:    Assessment & Plan  Anemia    Anemia, unspecified type    74 y.o. M with h/o ADHD, HTN, CAD, depression, HLD, CKD, presented with acute normocytic anemia, low ferritin and percent saturation, no evidence of overt bleeding, 1 week h/o dysphagia     # Acute anemia, iron deficiency, likely due to oozing mass seen on EGD   # Dysphagia, likely secondary to mass seen on endoscopy    -Await biopsy results  -Will advance to soft, low fiber and easy to chew diet.  -If he tolerates diet, okay to discharge from GI standpoint.  Further recommendations pending biopsy results.     LOS: 1 day     Paul Prather is a 74 y.o. male who was admitted with Anemia. He reports he was able to tolerate full liquid diet, no dysphagia.  Had a bowel movement, dark brown, no melena or hematochezia.    EGD yesterday  Single fungating, polypoid and ulcerated mass measuring 40 mm x 15 mm extending from lower third of the esophagus, to proximal gastric cardia involving the GE junction, covering one quarter of the circumference; bleeding occurred before intervention; performed cold forceps biopsy with partial removal; injected 3.5 mL of epinephrine to address bleeding; hemostasis achieved  Hematin in the fundus of the stomach and body of the stomach. This was suctioned out.  The stomach appeared normal.  The duodenal bulb and 2nd part of the duodenum appeared normal.       Subjective:    Patient expresses no new complaints  Patient denies abdominal pain, nausea, vomiting    Objective:    Vital signs in last 24 hours:  Temp:  [35.8 °C (96.4 °F)-36.6 °C (97.9 °F)] 36.6 °C (97.9 °F)  Heart Rate:  [53-67] 61  Resp:  [14-18] 18  BP: (102-127)/(59-74) 112/73    Intake/Output last 3 shifts:  I/O last 3 completed shifts:  In: 658.3 (7.6 mL/kg) [IV Piggyback:658.3]  Out: 1 (0 mL/kg) [Urine:1 (0 mL/kg/hr)]  Weight: 86.6 kg   Intake/Output this shift:  No intake/output data recorded.    Physical Exam  Constitutional:        General: He is not in acute distress.     Appearance: Normal appearance.   Cardiovascular:      Rate and Rhythm: Normal rate and regular rhythm.      Pulses: Normal pulses.      Heart sounds: Normal heart sounds.   Pulmonary:      Breath sounds: Normal breath sounds.   Abdominal:      General: Bowel sounds are normal. There is no distension.      Palpations: Abdomen is soft.      Tenderness: There is no abdominal tenderness.   Skin:     General: Skin is warm and dry.   Neurological:      General: No focal deficit present.      Mental Status: He is alert and oriented to person, place, and time. Mental status is at baseline.   Psychiatric:         Mood and Affect: Mood normal.         Behavior: Behavior normal.          Results for orders placed or performed during the hospital encounter of 03/17/25 (from the past 24 hours)   CBC   Result Value Ref Range    WBC 7.5 4.4 - 11.3 x10*3/uL    nRBC 0.0 0.0 - 0.0 /100 WBCs    RBC 3.19 (L) 4.50 - 5.90 x10*6/uL    Hemoglobin 7.6 (L) 13.5 - 17.5 g/dL    Hematocrit 24.7 (L) 41.0 - 52.0 %    MCV 77 (L) 80 - 100 fL    MCH 23.8 (L) 26.0 - 34.0 pg    MCHC 30.8 (L) 32.0 - 36.0 g/dL    RDW 16.1 (H) 11.5 - 14.5 %    Platelets 235 150 - 450 x10*3/uL   Type and screen   Result Value Ref Range    ABO TYPE O     Rh TYPE POS     ANTIBODY SCREEN NEG    Basic metabolic panel   Result Value Ref Range    Glucose 78 74 - 99 mg/dL    Sodium 139 136 - 145 mmol/L    Potassium 4.1 3.5 - 5.3 mmol/L    Chloride 108 (H) 98 - 107 mmol/L    Bicarbonate 26 21 - 32 mmol/L    Anion Gap 9 (L) 10 - 20 mmol/L    Urea Nitrogen 24 (H) 6 - 23 mg/dL    Creatinine 1.41 (H) 0.50 - 1.30 mg/dL    eGFR 52 (L) >60 mL/min/1.73m*2    Calcium 8.6 8.6 - 10.3 mg/dL   CBC and Auto Differential   Result Value Ref Range    WBC 8.5 4.4 - 11.3 x10*3/uL    nRBC 0.0 0.0 - 0.0 /100 WBCs    RBC 3.09 (L) 4.50 - 5.90 x10*6/uL    Hemoglobin 7.5 (L) 13.5 - 17.5 g/dL    Hematocrit 24.8 (L) 41.0 - 52.0 %    MCV 80 80 - 100 fL    MCH  24.3 (L) 26.0 - 34.0 pg    MCHC 30.2 (L) 32.0 - 36.0 g/dL    RDW 16.1 (H) 11.5 - 14.5 %    Platelets 234 150 - 450 x10*3/uL    Neutrophils % 77.6 40.0 - 80.0 %    Immature Granulocytes %, Automated 0.7 0.0 - 0.9 %    Lymphocytes % 11.2 13.0 - 44.0 %    Monocytes % 9.6 2.0 - 10.0 %    Eosinophils % 0.8 0.0 - 6.0 %    Basophils % 0.1 0.0 - 2.0 %    Neutrophils Absolute 6.61 (H) 1.60 - 5.50 x10*3/uL    Immature Granulocytes Absolute, Automated 0.06 0.00 - 0.50 x10*3/uL    Lymphocytes Absolute 0.95 0.80 - 3.00 x10*3/uL    Monocytes Absolute 0.82 (H) 0.05 - 0.80 x10*3/uL    Eosinophils Absolute 0.07 0.00 - 0.40 x10*3/uL    Basophils Absolute 0.01 0.00 - 0.10 x10*3/uL

## 2025-03-19 NOTE — CARE PLAN
The patient's goals for the shift include  will remain free from injury     The clinical goals for the shift include pt will remain hds

## 2025-03-19 NOTE — SIGNIFICANT EVENT
Biopsy of the GE junction mass showed invasive moderately differentiated adenocarcinoma, predominantly foveolar type.  Patient was notified of biopsy results.  Recommend op follow up with medical and radiation oncology. Pt requested to be discharged home today if possible.

## 2025-03-19 NOTE — NURSING NOTE
Patient is rating a low fall risk but came in with some dizziness, so the bed alarm was on. The patient and I just did a few laps around the floor and starting out he felt dizzy and had to sit down but once that passed he did ok but would want the bed alarm on for precaution but patient refused and do not want the bed alarm on.

## 2025-03-19 NOTE — PROGRESS NOTES
"Speech-Language Pathology    SLP Adult Inpatient Speech-Language Pathology Clinical Swallow Evaluation    Patient Name: Paul Prather  MRN: [id number]  Today's Date: 3/19/2025   Time Calculation  Start Time: 0944  Stop Time: 1011  Time Calculation (min): 27 min         Current Problem:   1. Anemia, unspecified type  Esophagogastroduodenoscopy (EGD)    Esophagogastroduodenoscopy (EGD)    Surgical Pathology Exam    Surgical Pathology Exam      2. Dysphagia, unspecified type  Esophagogastroduodenoscopy (EGD)    Esophagogastroduodenoscopy (EGD)    Surgical Pathology Exam    Surgical Pathology Exam        Risk for Aspiration: No    Diet Recommendations:  Solid Diet Recommendations : Regular (IDDSI Level 7)  Liquid Diet Recommendations: Thin (IDDSI Level 0)    Compensatory Swallowing Strategies:   Decrease distractions during eating/feeding  Upright 90 degrees as possible for all oral intake  Swallow 2 times per bite/sip  Single sips  Small bites/sips  Eat/feed slowly  Clear oral cavity prior to taking additional bites or sips    Medication Administration Recommendations:   Whole, With Liquid, 1-2 at a time      Dysphagia Goal: 3/19/25  Patient will demonstrate appropriate strategies for swallowing safety      Assessment:  Treatment Provided: Yes, see below  Treatment Tolerance: Patient tolerated treatment well  Medical Staff Made Aware: Yes      Plan:  SLP Plan: No skilled SLP  No Skilled SLP: Safe to return home  Diet Recommendations: Solid, Liquid  Solid Consistency: Regular (IDDSI Level 7)  Liquid Consistency: Thin (IDDSI Level 0)  SLP - OK to Discharge: Yes      Subjective   Current Problem:  This patient presented to [facility] with a diagnosis of anemia. He also reported difficulty with swallowing x 1 week, with description of globus pharyngeus. An EGD was completed 4/18 with the following results:    \"EGD performed today showing single fungating, polypoid and ulcerated mass measuring 40 mm x 15 mm extending from lower " "third of the esophagus, to proximal gastric cardia involving the GE junction, covering one quarter of the circumference\"    The swallow evaluation was conducted to identify swallowing deficits and to determine the least restrictive diet consistency for a safe effective swallow. Prior to this assessment the patient was consuming a full liquid diet, which changed to a regular consistency diet right before SLP assessed his swallowing skills.    General Visit Information:  Living Environment: Home  Ordering Physician: Goudijaron  Reason for Referral: dysphagia assessment  Past Medical History Relevant to Rehab: ADHD, HTN, CAD, depression, HLD, CKD  Prior Level of Function: WFL  Prior to Session Communication: Bedside nurse      Vital Signs:   Room air      Objective   Patient was awake, alert, able to follow commands and make wants and needs known. Vocal quality soft but clear.   Patient with own dentition, missing molars. Lingual and labial skills were WFL.   Patient completed the three ounce water challenge without difficulty. All solid trials were consumed without deficits.   The patient is appropriate for a regular diet and thin liquids, with use of swallow strategies.  Medications should be given 1-2 at a time with a sip of water.         Pain:  Pain Assessment  0-10 (Numeric) Pain Score: 0 - No pain    Oral/Motor Assessment:  Oral Hygiene: clean and clear  Dentition: Adequate/Natural, Some Missing Teeth  Oral Motor: Within Functional Limits      Consistencies Trialed:  Consistencies Trialed: Yes  Consistencies Trialed: Thin (IDDSI Level 0) - Straw, Pureed/extremely thick (IDDSI Level 4), Minced & moist/ground (IDDSI Level 5), Soft & bite sized/chopped (IDDSI Level 6), Regular (IDDSI Level 7)      Clinical Observations:  Patient Positioning: Upright in Bed  Management of Oral Secretions: Adequate  Was The 3 oz Swallow Protocol Completed: Yes            Dysphagia Therapy:    Patient verbalized intermittent globus " sensation mid sternum. He reported that it often happens when eating too fast, too large of bites, not chewing well.   SLP discussed possibility of the esophageal mass causing the globus sensation. SLP also provided the patient with swallow strategies and had the patient consume solids and liquids to ensure comprehension of the strategies.   Written communication was also provided to assist with carryover of the strategies going forward.      Dysphagia Goal: 3/19/25  Patient will demonstrate appropriate strategies for swallowing safety    Progress: Goal Met 3/19/25  Patient verbalized comprehension of information presented. No further ST is warranted at this time.

## 2025-03-21 LAB
LAB AP ASR DISCLAIMER: NORMAL
LABORATORY COMMENT REPORT: NORMAL
PATH REPORT.ADDENDUM SPEC: NORMAL
PATH REPORT.FINAL DX SPEC: NORMAL
PATH REPORT.GROSS SPEC: NORMAL
PATH REPORT.RELEVANT HX SPEC: NORMAL
PATH REPORT.TOTAL CANCER: NORMAL

## 2025-03-24 DIAGNOSIS — F41.1 GENERALIZED ANXIETY DISORDER: ICD-10-CM

## 2025-03-24 RX ORDER — OMEPRAZOLE 40 MG/1
40 CAPSULE, DELAYED RELEASE ORAL
COMMUNITY

## 2025-03-25 ENCOUNTER — APPOINTMENT (OUTPATIENT)
Dept: OPHTHALMOLOGY | Age: 75
End: 2025-03-25
Payer: MEDICARE

## 2025-03-25 RX ORDER — CLONAZEPAM 0.5 MG/1
0.5 TABLET ORAL EVERY 12 HOURS PRN
Qty: 30 TABLET | Refills: 0 | Status: SHIPPED | OUTPATIENT
Start: 2025-03-25

## 2025-03-25 RX ORDER — DEXTROAMPHETAMINE SACCHARATE, AMPHETAMINE ASPARTATE MONOHYDRATE, DEXTROAMPHETAMINE SULFATE AND AMPHETAMINE SULFATE 5; 5; 5; 5 MG/1; MG/1; MG/1; MG/1
20 CAPSULE, EXTENDED RELEASE ORAL EVERY MORNING
Qty: 30 CAPSULE | Refills: 0 | Status: SHIPPED | OUTPATIENT
Start: 2025-03-25 | End: 2025-04-24

## 2025-03-26 ENCOUNTER — APPOINTMENT (OUTPATIENT)
Dept: CARDIOLOGY | Facility: CLINIC | Age: 75
End: 2025-03-26
Payer: MEDICARE

## 2025-04-04 ENCOUNTER — TELEPHONE (OUTPATIENT)
Dept: CARDIOLOGY | Facility: CLINIC | Age: 75
End: 2025-04-04
Payer: MEDICARE

## 2025-04-04 NOTE — TELEPHONE ENCOUNTER
Pt called 4/3, states that he has been feeling lightheaded especially with standing up. His SBP has been 105-100teens. He was also recently diagnosed with esophageal cancer and has been anemic.  Pt is taking lisinopril 10 daily and Toprol 25 daily. He is asking if meds can/should be adjusted.    Discussed with Dr Ramicone, stop lisinopril. Called pt and left a detailed VM. Updated meds list.

## 2025-04-22 ENCOUNTER — APPOINTMENT (OUTPATIENT)
Dept: PRIMARY CARE | Facility: CLINIC | Age: 75
End: 2025-04-22
Payer: MEDICARE

## 2025-04-23 ENCOUNTER — OFFICE VISIT (OUTPATIENT)
Dept: PRIMARY CARE | Facility: CLINIC | Age: 75
End: 2025-04-23
Payer: MEDICARE

## 2025-04-23 VITALS
DIASTOLIC BLOOD PRESSURE: 68 MMHG | BODY MASS INDEX: 26.03 KG/M2 | WEIGHT: 181.8 LBS | HEIGHT: 70 IN | HEART RATE: 98 BPM | OXYGEN SATURATION: 97 % | SYSTOLIC BLOOD PRESSURE: 112 MMHG

## 2025-04-23 DIAGNOSIS — C15.9 MALIGNANT NEOPLASM OF ESOPHAGUS, UNSPECIFIED LOCATION (MULTI): Primary | ICD-10-CM

## 2025-04-23 DIAGNOSIS — F90.9 ATTENTION DEFICIT HYPERACTIVITY DISORDER (ADHD), UNSPECIFIED ADHD TYPE: ICD-10-CM

## 2025-04-23 DIAGNOSIS — I35.1 AORTIC VALVE REGURGITATION, ACQUIRED: ICD-10-CM

## 2025-04-23 DIAGNOSIS — E78.49 OTHER HYPERLIPIDEMIA: ICD-10-CM

## 2025-04-23 DIAGNOSIS — F41.1 GENERALIZED ANXIETY DISORDER: ICD-10-CM

## 2025-04-23 PROBLEM — R06.09 DYSPNEA ON EXERTION: Status: ACTIVE | Noted: 2025-04-20

## 2025-04-23 PROBLEM — K92.1 MELENA: Status: ACTIVE | Noted: 2025-04-20

## 2025-04-23 PROBLEM — R42 INTERMITTENT LIGHTHEADEDNESS: Status: ACTIVE | Noted: 2025-04-20

## 2025-04-23 PROCEDURE — 3074F SYST BP LT 130 MM HG: CPT | Performed by: INTERNAL MEDICINE

## 2025-04-23 PROCEDURE — 1124F ACP DISCUSS-NO DSCNMKR DOCD: CPT | Performed by: INTERNAL MEDICINE

## 2025-04-23 PROCEDURE — 99213 OFFICE O/P EST LOW 20 MIN: CPT | Performed by: INTERNAL MEDICINE

## 2025-04-23 PROCEDURE — 3078F DIAST BP <80 MM HG: CPT | Performed by: INTERNAL MEDICINE

## 2025-04-23 PROCEDURE — 1159F MED LIST DOCD IN RCRD: CPT | Performed by: INTERNAL MEDICINE

## 2025-04-23 RX ORDER — CLONAZEPAM 0.5 MG/1
0.5 TABLET ORAL EVERY 12 HOURS PRN
Qty: 30 TABLET | Refills: 0 | Status: SHIPPED | OUTPATIENT
Start: 2025-04-23

## 2025-04-23 RX ORDER — ONDANSETRON HYDROCHLORIDE 8 MG/1
8 TABLET, FILM COATED ORAL DAILY PRN
COMMUNITY
Start: 2025-04-03

## 2025-04-23 RX ORDER — POLYETHYLENE GLYCOL 3350 17 G/17G
17 POWDER, FOR SOLUTION ORAL EVERY 12 HOURS PRN
COMMUNITY
Start: 2025-04-21

## 2025-04-23 RX ORDER — DEXTROAMPHETAMINE SACCHARATE, AMPHETAMINE ASPARTATE MONOHYDRATE, DEXTROAMPHETAMINE SULFATE AND AMPHETAMINE SULFATE 5; 5; 5; 5 MG/1; MG/1; MG/1; MG/1
20 CAPSULE, EXTENDED RELEASE ORAL EVERY MORNING
Qty: 30 CAPSULE | Refills: 0 | Status: SHIPPED | OUTPATIENT
Start: 2025-04-23 | End: 2025-05-23

## 2025-04-23 RX ORDER — FERROUS SULFATE 325(65) MG
325 TABLET ORAL EVERY OTHER DAY
COMMUNITY
Start: 2025-04-21

## 2025-04-23 RX ORDER — OLANZAPINE 2.5 MG/1
2.5 TABLET, FILM COATED ORAL
COMMUNITY
Start: 2025-04-03

## 2025-04-23 NOTE — PROGRESS NOTES
"Subjective   Patient ID: Paul Prather is a 74 y.o. male who presents for Hospital Follow-up (Hospital follow up).    HPI     Review of Systems    Objective   /68 (BP Location: Right arm, Patient Position: Sitting, BP Cuff Size: Adult)   Pulse 98   Ht 1.778 m (5' 10\")   Wt 82.5 kg (181 lb 12.8 oz)   SpO2 97%   BMI 26.09 kg/m²     Physical Exam    Assessment/Plan   {Assess/PlanSmartLinks:32740}       "

## 2025-05-27 DIAGNOSIS — F41.1 GENERALIZED ANXIETY DISORDER: ICD-10-CM

## 2025-05-28 RX ORDER — CLONAZEPAM 0.5 MG/1
0.5 TABLET ORAL EVERY 12 HOURS PRN
Qty: 30 TABLET | Refills: 0 | Status: SHIPPED | OUTPATIENT
Start: 2025-05-28

## 2025-05-28 RX ORDER — DEXTROAMPHETAMINE SACCHARATE, AMPHETAMINE ASPARTATE MONOHYDRATE, DEXTROAMPHETAMINE SULFATE AND AMPHETAMINE SULFATE 5; 5; 5; 5 MG/1; MG/1; MG/1; MG/1
20 CAPSULE, EXTENDED RELEASE ORAL EVERY MORNING
Qty: 30 CAPSULE | Refills: 0 | Status: SHIPPED | OUTPATIENT
Start: 2025-05-28 | End: 2025-06-27

## 2025-05-29 DIAGNOSIS — I47.10 SVT (SUPRAVENTRICULAR TACHYCARDIA): ICD-10-CM

## 2025-05-29 DIAGNOSIS — E78.2 MIXED HYPERLIPIDEMIA: ICD-10-CM

## 2025-05-29 DIAGNOSIS — I25.10 CORONARY ARTERY DISEASE INVOLVING NATIVE CORONARY ARTERY OF NATIVE HEART WITHOUT ANGINA PECTORIS: ICD-10-CM

## 2025-05-29 RX ORDER — ATORVASTATIN CALCIUM 40 MG/1
40 TABLET, FILM COATED ORAL DAILY
COMMUNITY
Start: 2025-05-29

## 2025-05-30 RX ORDER — ATORVASTATIN CALCIUM 40 MG/1
40 TABLET, FILM COATED ORAL DAILY
Qty: 30 TABLET | Refills: 0 | Status: SHIPPED | OUTPATIENT
Start: 2025-05-30

## 2025-05-31 RX ORDER — ATORVASTATIN CALCIUM 40 MG/1
40 TABLET, FILM COATED ORAL DAILY
Qty: 30 TABLET | Refills: 1 | Status: SHIPPED | OUTPATIENT
Start: 2025-05-31

## 2025-06-02 DIAGNOSIS — F41.9 ANXIETY AND DEPRESSION: ICD-10-CM

## 2025-06-02 DIAGNOSIS — F32.A ANXIETY AND DEPRESSION: ICD-10-CM

## 2025-06-02 RX ORDER — VENLAFAXINE HYDROCHLORIDE 150 MG/1
150 CAPSULE, EXTENDED RELEASE ORAL DAILY
Qty: 90 CAPSULE | Refills: 3 | Status: SHIPPED | OUTPATIENT
Start: 2025-06-02

## 2025-06-23 DIAGNOSIS — F41.1 GENERALIZED ANXIETY DISORDER: ICD-10-CM

## 2025-06-23 PROBLEM — E78.5 HYPERLIPIDEMIA: Status: ACTIVE | Noted: 2025-04-20

## 2025-06-24 RX ORDER — DEXTROAMPHETAMINE SACCHARATE, AMPHETAMINE ASPARTATE MONOHYDRATE, DEXTROAMPHETAMINE SULFATE AND AMPHETAMINE SULFATE 5; 5; 5; 5 MG/1; MG/1; MG/1; MG/1
20 CAPSULE, EXTENDED RELEASE ORAL EVERY MORNING
Qty: 30 CAPSULE | Refills: 0 | Status: SHIPPED | OUTPATIENT
Start: 2025-06-24 | End: 2025-07-24

## 2025-06-29 NOTE — PROGRESS NOTES
"    History Of Present Illness:      This is a 75-year-old male with a history of atrial tachycardia.  He is also treated for ADHD.  The patient was diagnosed with esophageal cancer, stage III adenocarcinoma and he is undergoing chemotherapy, with plans for surgery later this year.  No new symptoms from a cardiac perspective.    Review of Systems  Other review of systems negative  Last Recorded Vitals:      3/18/2025     2:44 PM 3/18/2025     8:20 PM 3/19/2025     1:16 AM 3/19/2025     3:00 AM 3/19/2025     7:59 AM 3/19/2025    12:27 PM 4/23/2025    11:33 AM   Vitals   Systolic 110 120 103 102 112 126 112   Diastolic 72 71 59 63 73 80 68   BP Location Left arm Right arm Right arm Right arm Right arm Left arm Right arm   Heart Rate 54 57 67 60 61 57 98   Temp 35.8 °C (96.4 °F) 36.6 °C (97.9 °F) 36.4 °C (97.5 °F) 36.2 °C (97.2 °F) 36.6 °C (97.9 °F) 36.9 °C (98.4 °F)    Resp 17 18 18 16 18 17    Height       1.778 m (5' 10\")   Weight (lb)       181.8   BMI       26.09 kg/m2   BSA (m2)       2.02 m2   Visit Report       Report     Allergies:  Other and Sulfa (sulfonamide antibiotics)  Outpatient Medications:  Current Outpatient Medications   Medication Instructions    amphetamine-dextroamphetamine XR (Adderall XR) 20 mg 24 hr capsule 20 mg, oral, Every morning, Do not crush or chew.    atorvastatin (LIPITOR) 40 mg, oral, Daily    atorvastatin (LIPITOR) 40 mg, oral, Daily    cetirizine (ZYRTEC) 10 mg, oral, Daily    clonazePAM (KLONOPIN) 0.5 mg, oral, Every 12 hours PRN    ferrous sulfate 325 mg, Every other day    latanoprost (Xalatan) 0.005 % ophthalmic solution 1 drop, Both Eyes, Nightly    multivit-minerals/folic acid (CENTRUM ADULTS ORAL) No dose, route, or frequency recorded.    OLANZapine (ZYPREXA) 2.5 mg    omeprazole (PRILOSEC) 40 mg, Daily before breakfast    ondansetron (ZOFRAN) 8 mg, Daily PRN    polyethylene glycol (GLYCOLAX, MIRALAX) 17 g, Every 12 hours PRN    traZODone (DESYREL) 150 mg, oral, Nightly    " venlafaxine XR (EFFEXOR-XR) 150 mg, oral, Daily       Physical Exam:    General Appearance:  Alert, oriented, no distress  Skin:  Warm and dry  Head and Neck:  No elevation of JVP, no carotid bruits  Cardiac Exam:  Rhythm is regular, S1 and S2 are normal, no murmur S3 or S4  Lungs:  Clear to auscultation  Extremities:  no edema  Neurologic:  No focal deficits  Psychiatric:  Appropriate mood and behavior    Lab Results:    CMP:  Recent Labs     03/19/25  0515 03/18/25  0447 03/18/25  0245 03/17/25  1345 10/16/24  1206 09/05/24  1512 08/19/24  1058 07/19/24  1247    140 138 138 142 138 140 141   K 4.1 4.7 4.5 4.6 4.5 4.7 4.2 4.1   * 110* 108* 108* 104 102 104 106   CO2 26 26 27 27 30 29 27 25   ANIONGAP 9* 9* 8* 8* 13 12 13 14   BUN 24* 32* 33* 36* 32* 41* 22 30*   CREATININE 1.41* 1.53* 1.44* 1.68* 1.45* 1.71* 1.56* 1.48*   EGFR 52* 47* 51* 42* 50* 41* 46* 49*   MG  --   --  2.06  --   --   --   --   --      Recent Labs     03/17/25  1345 10/16/24  1206 09/07/23  1530 08/07/23  1206   ALBUMIN 4.1 4.2 4.6 4.2   ALKPHOS 96 106 76 71   ALT 18 26 24 25   AST 19 23 24 25   BILITOT 0.3 0.4 0.4 0.4   LIPASE 51  --   --   --      CBC:  Recent Labs     03/19/25  0515 03/18/25  1618 03/18/25  0447 03/18/25  0245 03/17/25  1345 08/19/24  1058 07/19/24  1247 10/27/23  1354   WBC 8.5 7.5 7.2 8.2 7.6 8.9 7.3 10.1   HGB 7.5* 7.6* 7.1* 7.5* 8.6* 14.5 14.6 15.1   HCT 24.8* 24.7* 24.0* 25.2* 29.0* 43.9 44.3 46.4    235 206 216 259 204 204 255   MCV 80 77* 82 82 82 98 99 99     COAG:   Recent Labs     03/17/25  1613 08/19/24  1058   INR 1.1 1.0     Cardiology Tests (personally reviewed):    I have personally review the diagnostic cardiac testing and my interpretation is as follows:     EKG November 9, 2022: Sinus tachycardia  Echocardiogram December 2022: Normal left ventricular function with mild aortic valve regurgitation  Holter monitor January 15, 2024: Sinus rhythm with brief runs of atrial tachycardia up to 9  beats in duration  Echocardiogram January 2024: Normal left ventricular function, mild aortic valve regurgitation  Coronary artery calcium score: 4049  Exercise treadmill stress test: No ischemic changes by EKG.  The patient became hypotensive during the recovery phase.  Cardiac catheterization August 2024: Moderate CAD involving LAD, left circumflex, and RCA    Assessment/Plan   Problem List Items Addressed This Visit           ICD-10-CM    SVT (supraventricular tachycardia) (Chronic) I47.10    History of palpitations and atrial tachycardia.  The patient has had no symptomatic arrhythmia episodes.  The most recent EKG showed normal sinus rhythm with no ischemic changes and no ventricular preexcitation.  EP follow-up in 1 to 2 years.         Coronary artery disease involving native coronary artery of native heart without angina pectoris - Primary (Chronic) I25.10    Paul has a history of coronary artery disease based on cardiac catheterization from August 2024.  The most recent LDL cholesterol from October 2024 was 38 mg/dL.  Continue Lipitor at the same dosage.         Elevated coronary artery calcium score (Chronic) R93.1       James C Ramicone, DO

## 2025-06-30 ENCOUNTER — APPOINTMENT (OUTPATIENT)
Dept: CARDIOLOGY | Facility: CLINIC | Age: 75
End: 2025-06-30
Payer: MEDICARE

## 2025-06-30 VITALS
WEIGHT: 179.6 LBS | OXYGEN SATURATION: 95 % | HEART RATE: 72 BPM | SYSTOLIC BLOOD PRESSURE: 125 MMHG | HEIGHT: 69 IN | DIASTOLIC BLOOD PRESSURE: 70 MMHG | BODY MASS INDEX: 26.6 KG/M2

## 2025-06-30 DIAGNOSIS — I25.10 CORONARY ARTERY DISEASE INVOLVING NATIVE CORONARY ARTERY OF NATIVE HEART WITHOUT ANGINA PECTORIS: ICD-10-CM

## 2025-06-30 DIAGNOSIS — I47.10 SVT (SUPRAVENTRICULAR TACHYCARDIA): ICD-10-CM

## 2025-06-30 DIAGNOSIS — E78.2 MIXED HYPERLIPIDEMIA: ICD-10-CM

## 2025-06-30 DIAGNOSIS — R93.1 ELEVATED CORONARY ARTERY CALCIUM SCORE: Chronic | ICD-10-CM

## 2025-06-30 DIAGNOSIS — I25.10 CORONARY ARTERY DISEASE INVOLVING NATIVE CORONARY ARTERY OF NATIVE HEART WITHOUT ANGINA PECTORIS: Primary | Chronic | ICD-10-CM

## 2025-06-30 DIAGNOSIS — I47.10 SVT (SUPRAVENTRICULAR TACHYCARDIA): Chronic | ICD-10-CM

## 2025-06-30 PROCEDURE — 99212 OFFICE O/P EST SF 10 MIN: CPT

## 2025-06-30 PROCEDURE — 1159F MED LIST DOCD IN RCRD: CPT | Performed by: INTERNAL MEDICINE

## 2025-06-30 PROCEDURE — 3074F SYST BP LT 130 MM HG: CPT | Performed by: INTERNAL MEDICINE

## 2025-06-30 PROCEDURE — 3078F DIAST BP <80 MM HG: CPT | Performed by: INTERNAL MEDICINE

## 2025-06-30 PROCEDURE — 1126F AMNT PAIN NOTED NONE PRSNT: CPT | Performed by: INTERNAL MEDICINE

## 2025-06-30 PROCEDURE — 99213 OFFICE O/P EST LOW 20 MIN: CPT | Performed by: INTERNAL MEDICINE

## 2025-06-30 PROCEDURE — 1036F TOBACCO NON-USER: CPT | Performed by: INTERNAL MEDICINE

## 2025-06-30 RX ORDER — SENNOSIDES 8.6 MG/1
8.6 TABLET ORAL EVERY 12 HOURS PRN
COMMUNITY
Start: 2025-04-21

## 2025-06-30 RX ORDER — LIDOCAINE AND PRILOCAINE 25; 25 MG/G; MG/G
CREAM TOPICAL ONCE
COMMUNITY

## 2025-06-30 RX ORDER — POTASSIUM CHLORIDE 20 MEQ/1
1 TABLET, EXTENDED RELEASE ORAL
COMMUNITY
Start: 2025-05-22

## 2025-06-30 RX ORDER — ATORVASTATIN CALCIUM 40 MG/1
40 TABLET, FILM COATED ORAL DAILY
Qty: 90 TABLET | Refills: 3 | Status: SHIPPED | OUTPATIENT
Start: 2025-06-30 | End: 2026-06-30

## 2025-06-30 ASSESSMENT — ENCOUNTER SYMPTOMS
DEPRESSION: 0
OCCASIONAL FEELINGS OF UNSTEADINESS: 1
LOSS OF SENSATION IN FEET: 0

## 2025-06-30 ASSESSMENT — PAIN SCALES - GENERAL: PAINLEVEL_OUTOF10: 0-NO PAIN

## 2025-06-30 NOTE — ASSESSMENT & PLAN NOTE
History of palpitations and atrial tachycardia.  The patient has had no symptomatic arrhythmia episodes.  The most recent EKG showed normal sinus rhythm with no ischemic changes and no ventricular preexcitation.  EP follow-up in 1 to 2 years.

## 2025-06-30 NOTE — ASSESSMENT & PLAN NOTE
Paul has a history of coronary artery disease based on cardiac catheterization from August 2024.  The most recent LDL cholesterol from October 2024 was 38 mg/dL.  Continue Lipitor at the same dosage.   Agree with above.  45 yo M with Sarcoidosis, s/p emergent left upper lobectomy for uncontrolled hemoptysis, respiratory failure s/p trach on mechanical ventilation.  Acute on chronic respiratory failure with VRE bacteremia of unclear etiology and MRSA tracheobronchitis.   Switch to Unasyn for VRE bacteremia for total of 2 weeks. Plan for PICC this week  Appreciate ID follow up.  Continue Bactrim for MRSA tracheitis.   Continue empiric PO vancomycin in setting of prior C. Diff.  CPAP and trach collar trial as tolerated- currently tolerating PS 5/5 - will continue through the day and if tolerating will continue over night.  Out of bed to chair, trach care, chest PT .

## 2025-07-03 RX ORDER — ATORVASTATIN CALCIUM 40 MG/1
40 TABLET, FILM COATED ORAL DAILY
Qty: 30 TABLET | Refills: 0 | OUTPATIENT
Start: 2025-07-03

## 2025-07-07 DIAGNOSIS — F41.1 GENERALIZED ANXIETY DISORDER: ICD-10-CM

## 2025-07-07 RX ORDER — ATORVASTATIN CALCIUM 40 MG/1
40 TABLET, FILM COATED ORAL DAILY
COMMUNITY
Start: 2025-07-07

## 2025-07-08 RX ORDER — CLONAZEPAM 0.5 MG/1
0.5 TABLET ORAL EVERY 12 HOURS PRN
Qty: 30 TABLET | Refills: 0 | Status: SHIPPED | OUTPATIENT
Start: 2025-07-08

## 2025-07-11 ENCOUNTER — APPOINTMENT (OUTPATIENT)
Dept: NEPHROLOGY | Facility: CLINIC | Age: 75
End: 2025-07-11
Payer: MEDICARE

## 2025-07-17 ASSESSMENT — DERMATOLOGY QUALITY OF LIFE (QOL) ASSESSMENT
WHAT SINGLE SKIN CONDITION LISTED BELOW IS THE PATIENT ANSWERING THE QUALITY-OF-LIFE ASSESSMENT QUESTIONS ABOUT: NONE OF THE ABOVE
WHAT SINGLE SKIN CONDITION LISTED BELOW IS THE PATIENT ANSWERING THE QUALITY-OF-LIFE ASSESSMENT QUESTIONS ABOUT: NONE OF THE ABOVE
RATE HOW BOTHERED YOU ARE BY EFFECTS OF YOUR SKIN PROBLEMS ON YOUR ACTIVITIES (EG, GOING OUT, ACCOMPLISHING WHAT YOU WANT, WORK ACTIVITIES OR YOUR RELATIONSHIPS WITH OTHERS): 1
RATE HOW BOTHERED YOU ARE BY SYMPTOMS OF YOUR SKIN PROBLEM (EG, ITCHING, STINGING BURNING, HURTING OR SKIN IRRITATION): 3
RATE HOW BOTHERED YOU ARE BY SYMPTOMS OF YOUR SKIN PROBLEM (EG, ITCHING, STINGING BURNING, HURTING OR SKIN IRRITATION): 3
RATE HOW BOTHERED YOU ARE BY EFFECTS OF YOUR SKIN PROBLEMS ON YOUR ACTIVITIES (EG, GOING OUT, ACCOMPLISHING WHAT YOU WANT, WORK ACTIVITIES OR YOUR RELATIONSHIPS WITH OTHERS): 1

## 2025-07-21 ENCOUNTER — TELEPHONE (OUTPATIENT)
Dept: PRIMARY CARE | Facility: CLINIC | Age: 75
End: 2025-07-21
Payer: MEDICARE

## 2025-07-21 DIAGNOSIS — F41.1 GENERALIZED ANXIETY DISORDER: ICD-10-CM

## 2025-07-21 RX ORDER — DEXTROAMPHETAMINE SACCHARATE, AMPHETAMINE ASPARTATE MONOHYDRATE, DEXTROAMPHETAMINE SULFATE AND AMPHETAMINE SULFATE 5; 5; 5; 5 MG/1; MG/1; MG/1; MG/1
20 CAPSULE, EXTENDED RELEASE ORAL EVERY MORNING
Qty: 30 CAPSULE | Refills: 0 | Status: SHIPPED | OUTPATIENT
Start: 2025-07-21 | End: 2025-08-20

## 2025-07-21 NOTE — TELEPHONE ENCOUNTER
Pt called stating he's getting more depressed dealing with his ca dx. He's asking if you could up his depression med.

## 2025-07-22 DIAGNOSIS — F32.5 DEPRESSION, MAJOR, IN REMISSION: Primary | ICD-10-CM

## 2025-07-22 RX ORDER — VENLAFAXINE HYDROCHLORIDE 75 MG/1
75 CAPSULE, EXTENDED RELEASE ORAL DAILY
Qty: 30 CAPSULE | Refills: 11 | Status: SHIPPED | OUTPATIENT
Start: 2025-07-22 | End: 2026-07-22

## 2025-07-23 ENCOUNTER — APPOINTMENT (OUTPATIENT)
Dept: PRIMARY CARE | Facility: CLINIC | Age: 75
End: 2025-07-23
Payer: MEDICARE

## 2025-07-23 VITALS
DIASTOLIC BLOOD PRESSURE: 80 MMHG | OXYGEN SATURATION: 97 % | HEART RATE: 79 BPM | WEIGHT: 184 LBS | BODY MASS INDEX: 27.25 KG/M2 | HEIGHT: 69 IN | SYSTOLIC BLOOD PRESSURE: 130 MMHG

## 2025-07-23 DIAGNOSIS — F41.0 GENERALIZED ANXIETY DISORDER WITH PANIC ATTACKS: ICD-10-CM

## 2025-07-23 DIAGNOSIS — C15.9 MALIGNANT NEOPLASM OF ESOPHAGUS, UNSPECIFIED LOCATION (MULTI): Primary | ICD-10-CM

## 2025-07-23 DIAGNOSIS — N18.2 CKD (CHRONIC KIDNEY DISEASE), STAGE II: ICD-10-CM

## 2025-07-23 DIAGNOSIS — F90.9 ATTENTION DEFICIT HYPERACTIVITY DISORDER (ADHD), UNSPECIFIED ADHD TYPE: ICD-10-CM

## 2025-07-23 DIAGNOSIS — F41.1 GENERALIZED ANXIETY DISORDER WITH PANIC ATTACKS: ICD-10-CM

## 2025-07-23 DIAGNOSIS — I10 HTN (HYPERTENSION), BENIGN: ICD-10-CM

## 2025-07-23 DIAGNOSIS — F32.5 DEPRESSION, MAJOR, IN REMISSION: ICD-10-CM

## 2025-07-23 DIAGNOSIS — I47.10 SVT (SUPRAVENTRICULAR TACHYCARDIA): Chronic | ICD-10-CM

## 2025-07-23 PROCEDURE — 3075F SYST BP GE 130 - 139MM HG: CPT | Performed by: INTERNAL MEDICINE

## 2025-07-23 PROCEDURE — 99213 OFFICE O/P EST LOW 20 MIN: CPT | Performed by: INTERNAL MEDICINE

## 2025-07-23 PROCEDURE — 1159F MED LIST DOCD IN RCRD: CPT | Performed by: INTERNAL MEDICINE

## 2025-07-23 PROCEDURE — 3079F DIAST BP 80-89 MM HG: CPT | Performed by: INTERNAL MEDICINE

## 2025-07-24 ENCOUNTER — APPOINTMENT (OUTPATIENT)
Dept: DERMATOLOGY | Facility: CLINIC | Age: 75
End: 2025-07-24
Payer: MEDICARE

## 2025-07-24 DIAGNOSIS — Z12.83 SCREENING EXAM FOR SKIN CANCER: Primary | ICD-10-CM

## 2025-07-24 DIAGNOSIS — L81.4 LENTIGO: ICD-10-CM

## 2025-07-24 DIAGNOSIS — L82.1 SEBORRHEIC KERATOSIS: ICD-10-CM

## 2025-07-24 DIAGNOSIS — D18.01 HEMANGIOMA OF SKIN: ICD-10-CM

## 2025-07-24 DIAGNOSIS — Z12.83 ENCOUNTER FOR SCREENING FOR MALIGNANT NEOPLASM OF SKIN: ICD-10-CM

## 2025-07-24 PROCEDURE — 1159F MED LIST DOCD IN RCRD: CPT | Performed by: NURSE PRACTITIONER

## 2025-07-24 PROCEDURE — 99213 OFFICE O/P EST LOW 20 MIN: CPT | Performed by: NURSE PRACTITIONER

## 2025-07-24 PROCEDURE — 1160F RVW MEDS BY RX/DR IN RCRD: CPT | Performed by: NURSE PRACTITIONER

## 2025-07-24 ASSESSMENT — PATIENT HEALTH QUESTIONNAIRE - PHQ9
2. FEELING DOWN, DEPRESSED OR HOPELESS: NOT AT ALL
SUM OF ALL RESPONSES TO PHQ9 QUESTIONS 1 AND 2: 0
1. LITTLE INTEREST OR PLEASURE IN DOING THINGS: NOT AT ALL

## 2025-07-24 NOTE — PROGRESS NOTES
Subjective     Paul Prather is a 75 y.o. male who presents for the following: Skin Check (History of BCC (07/2024). Current chemotherapy/immunotherapy treatments for esophageal cancer. Accompanied by wife.).     Intake Questions  Do you have any new or changing Lesions?: (Patient-Rptd) (P) No  Where are these new or changing lesion(s) located?: (Patient-Rptd) (P) Bruises  For patients coming in for a Follow-up Visit:  Have there been any changes in your health since your last visit?: (Patient-Rptd) (P) Cancer  Are you an organ transplant recipient?: (Patient-Rptd) (P) No  Have you had or do you have a Staph Infection?: (Patient-Rptd) (P) No  Do you use sunscreen?: (Patient-Rptd) (P) Occasionally  Do you use a tanning bed?: (Patient-Rptd) (P) No    Review of Systems:  No other skin or systemic complaints other than what is documented elsewhere in the note.    The following portions of the chart were reviewed this encounter and updated as appropriate:  Tobacco  Allergies  Meds  Problems  Med Hx  Surg Hx  Fam Hx         Skin Cancer History  Biopsy Log Book  Biopsied Type Location Status   7/18/24 BCC Left Buccal Cheek Treatment Complete  8/14/24       Additional History      Specialty Problems    None       Objective   Well appearing patient in no apparent distress; mood and affect are within normal limits.    A full examination was performed including scalp, head, eyes, ears, nose, lips, neck, chest, axillae, abdomen, back, buttocks, bilateral upper extremities, bilateral lower extremities, hands, feet, fingers, toes, fingernails, and toenails. All findings within normal limits unless otherwise noted below.    Assessment/Plan   Skin Exam  1. ENCOUNTER FOR SCREENING FOR MALIGNANT NEOPLASM OF SKIN      2. SCREENING EXAM FOR SKIN CANCER  Generalized  Scattered benign lesions. Scar(s) clear no sign of recurrence.   No evidence of recurrence in scar and benign ROS.   Continue with regular total body skin  exams.  ABCDEs of melanoma and atypical moles were discussed with the patient.  Patient was instructed to perform monthly self skin examination.  We recommended that the patient have regular full skin exams given an increased risk of subsequent skin cancers.  The patient was instructed to use sun protective behaviors including use of broad spectrum sunscreens and sun protective clothing to reduce risk of skin cancers.  3. SEBORRHEIC KERATOSIS  Generalized  Stuck on verrucous, tan-brown papules and plaques.    Although Seborrheic Keratoses can be troublesome and unsightly, they are entirely benign.  Removal of Seborrheic Keratoses is considered a cosmetic procedure. Removal is typically performed using liquid nitrogen cryotherapy.  Treatment of current lesions does not prevent the development of new Seborrheic Keratoses in the future.  4. HEMANGIOMA OF SKIN  Generalized  Violaceous/red papule with maroon lagoons   - A cherry hemangioma is a small macule (small, flat, smooth area) or papule (small, solid bump) formed from an overgrowth of tiny blood vessels in the skin. Cherry hemangiomas are characteristically red or purplish in color. They often first appear in middle adulthood and usually increase in number with age. Cherry hemangiomas are noncancerous (benign) and are common in adults.  - Lesions are benign, reassured patient.   5. LENTIGO  Generalized  Scattered tan macules in sun-exposed areas.  A solar lentigo (plural, solar lentigines), sometimes called an age spot or liver spot, is a brown macule (small, flat, smooth area of skin) caused by chronic sun or artificial ultraviolet (UV) light exposure. There may be just one lentigo or there may be multiple. This type of lentigo is different from lentigo simplex (discussed separately) because it is caused by exposure to UV light. Solar lentigines are benign, but they do indicate excessive sun exposure, a risk factor for the development of skin cancer.  Lesions are  benign, no treatment needed.     Follow up in 6 months. Please call me if there are any changes or development of concerning symptoms (lesion/skin condition is changing, bleeding, enlarging, or worsening).

## 2025-07-30 NOTE — PROGRESS NOTES
"Subjective   Patient ID: Paul Prather is a 75 y.o. male who presents for Follow-up.    HPI esoph ca, dep, anx, add, svt  Struggling a bit emotionally  Physically seems ok    Review of Systems  Sad and worried  Finishing chemo    Objective   /80   Pulse 79   Ht 1.753 m (5' 9\")   Wt 83.5 kg (184 lb)   SpO2 97%   BMI 27.17 kg/m²     Physical Exam  Gen nad, affect wnl  Heentt eomfg, face symmetric, ncat; alopecia  Neck w/o la, tm, bruit  Lungs clear   Cv rrr nl s1, s2  Ext w/o edema  Neuro grossly nonfocal  Skin good color    Assessment/Plan   Diagnoses and all orders for this visit:  Malignant neoplasm of esophagus, unspecified location (Multi)  HTN (hypertension), benign  Depression, major, in remission  Attention deficit hyperactivity disorder (ADHD), unspecified ADHD type  CKD (chronic kidney disease), stage II  SVT (supraventricular tachycardia)  Generalized anxiety disorder with panic attacks     Inc effexor to 225 daily  Fu 4 weeks  "

## 2025-08-01 ENCOUNTER — APPOINTMENT (OUTPATIENT)
Dept: NEPHROLOGY | Facility: CLINIC | Age: 75
End: 2025-08-01
Payer: MEDICARE

## 2025-08-01 VITALS
HEART RATE: 104 BPM | SYSTOLIC BLOOD PRESSURE: 111 MMHG | BODY MASS INDEX: 26.96 KG/M2 | HEIGHT: 69 IN | DIASTOLIC BLOOD PRESSURE: 57 MMHG | OXYGEN SATURATION: 94 % | WEIGHT: 182 LBS | TEMPERATURE: 97.8 F

## 2025-08-01 DIAGNOSIS — N18.30 STAGE 3 CHRONIC KIDNEY DISEASE, UNSPECIFIED WHETHER STAGE 3A OR 3B CKD (MULTI): Primary | ICD-10-CM

## 2025-08-01 DIAGNOSIS — C15.9 MALIGNANT NEOPLASM OF ESOPHAGUS, UNSPECIFIED LOCATION (MULTI): ICD-10-CM

## 2025-08-01 DIAGNOSIS — I10 HTN (HYPERTENSION), BENIGN: ICD-10-CM

## 2025-08-01 DIAGNOSIS — Z79.1 NSAID LONG-TERM USE: ICD-10-CM

## 2025-08-01 PROCEDURE — 99213 OFFICE O/P EST LOW 20 MIN: CPT | Performed by: NURSE PRACTITIONER

## 2025-08-01 PROCEDURE — 3074F SYST BP LT 130 MM HG: CPT | Performed by: NURSE PRACTITIONER

## 2025-08-01 PROCEDURE — 3078F DIAST BP <80 MM HG: CPT | Performed by: NURSE PRACTITIONER

## 2025-08-01 PROCEDURE — 1159F MED LIST DOCD IN RCRD: CPT | Performed by: NURSE PRACTITIONER

## 2025-08-01 ASSESSMENT — ENCOUNTER SYMPTOMS
BACK PAIN: 0
HEMATOLOGIC/LYMPHATIC NEGATIVE: 1
FATIGUE: 1
CARDIOVASCULAR NEGATIVE: 1
GASTROINTESTINAL NEGATIVE: 1
NEUROLOGICAL NEGATIVE: 1
RESPIRATORY NEGATIVE: 1
PSYCHIATRIC NEGATIVE: 1
ENDOCRINE NEGATIVE: 1

## 2025-08-01 NOTE — PROGRESS NOTES
History Of Present Illness  Paul Prather is a 75 y.o. male with medical history significant for CKD3, HTN, ADHD, chronic back pain, anxiety, and new dx of esophageal cancer (4/3/25) currently undergoing chemotherapy who presents for a 6-month fuv for CKD.     Past Medical History  As above.    Surgical History  He has a past surgical history that includes Cardiac catheterization (N/A, 08/26/2024); Knee arthroscopy w/ meniscal repair (Left); Back surgery (2020); Circumcision, primary (1949); Sinus surgery; Skin biopsy (7.17.24); Mohs surgery; and Renal biopsy (2021).     Social History  He reports that he quit smoking about 50 years ago. His smoking use included cigarettes. He started smoking about 53 years ago. He has a 1.2 pack-year smoking history. He has never used smokeless tobacco. He reports that he does not currently use alcohol. He reports that he does not use drugs.    Family History  Family History   Problem Relation Name Age of Onset    Other (arteriosclerotic vascular disease) Father      Alcohol abuse Maternal Grandfather Prakash Semaj     Arthritis Mother Ashanti Prather     Depression Daughter Jennie Crescencio     Asthma Daughter Jennie Crescencio     Asthma Daughter Karen Crescencio     Depression Father Taz Crescencio     Diabetes Father Taz Crescencio     Hearing loss Father Taz Crescencio     Other (arteriosclerotic vascular disease) Father Taz Crescencio     Depression Brother Stewart Crescencio     Hearing loss Brother Stewart Crescencio     Hearing loss Brother Mal Crescencio     Heart disease Mother Ashanti Prather 70 - 79    Miscarriages / Stillbirths Mother Ashanti Prather 20 - 29        Allergies  Other and Sulfa (sulfonamide antibiotics)    Review of Systems   Constitutional:  Positive for fatigue.   HENT: Negative.     Respiratory: Negative.     Cardiovascular: Negative.    Gastrointestinal: Negative.    Endocrine: Negative.    Genitourinary: Negative.    Musculoskeletal:  Negative for back pain.   Skin: Negative.    Neurological:  "Negative.    Hematological: Negative.    Psychiatric/Behavioral: Negative.          Physical Exam  Constitutional:       Appearance: Normal appearance.   HENT:      Head: Normocephalic and atraumatic.      Mouth/Throat:      Mouth: Mucous membranes are moist.     Cardiovascular:      Rate and Rhythm: Normal rate and regular rhythm.      Pulses: Normal pulses.      Heart sounds: Normal heart sounds.   Pulmonary:      Effort: Pulmonary effort is normal.      Breath sounds: Normal breath sounds.     Musculoskeletal:         General: Normal range of motion.     Skin:     General: Skin is warm and dry.     Neurological:      General: No focal deficit present.      Mental Status: He is alert and oriented to person, place, and time.     Psychiatric:         Mood and Affect: Mood normal.         Behavior: Behavior normal.         Thought Content: Thought content normal.         Judgment: Judgment normal.          Last Recorded Vitals  Blood pressure 111/57, pulse 104, temperature 36.6 °C (97.8 °F), temperature source Oral, height 1.753 m (5' 9\"), weight 82.6 kg (182 lb), SpO2 94%.    Relevant Results  7/30/25: sCr 1.33 mg/dL with eGFR 55 ml/min/1.73m2.        Assessment/Plan     75 y.o. male with medical history significant for CKD3, HTN, ADHD, chronic back pain, anxiety, and new dx of esophageal cancer (4/3/25) currently undergoing chemotherapy who presents for a 6-month fuv for CKD.     # CKD3: baseline sCr 1.5 - 2.0 mg/dL with eGFR 35 - 50 ml/min/1.73m2 since at least 2016. Per note from the [facility], \"renal biopsy showed 10-15% tubular atrophy/interstitial fibrosis with some glomerulosclerosis\". Renal US done on 11/14/22 showed left renal nonobstructive calculus and small cyst otherwise unremarkable. Likely due to hypertensive kidney disease and NSAIDs-associated kidney disease. Most recent sCr 1.33 mg/dL with eGFR 55 ml/min/1.73m2 (7/30/25) - at baseline. Currently stable.    # HTN: well controlled with " current regimen.     # Long-term NSAIDs use: no longer use.    # Esophageal cancer: dx 4/3/25, currently undergoing chemotherapy. Reportedly will complete the last dose, then may do surgery. After that may need to do immunotherapy.     Plan:  - May need to refer to Nephrology/Oncology if further chemotherapy and/or immunotherapy need to be done. Patient will keep us updated.  - Continue to follow up with PCP for optimal HTN management. Goal /80 mmHg for renal protection.   - No changes with current medications.  - Reviewed strategies for preserving remaining kidney function includin) Avoidance of NSAIDs including Aleve (Naprosyn), Motrin (Ibuprofen), Mobic (Meloxicam), Celebrex (Celecoxib) and aspirin as well as PPI acid blocking medications such as Prilosec (omeprazole), Protonix (pantoprazole), and Nexium (esomeprazole), as well as other nephrotoxic agents.   2) Avoidance of tobacco or alcohol use.   3) Adequate hydration daily.   4) Blood pressure target 130/80 mmHg.   5) Daily dietary sodium intake less than 2 grams per day.    6) Maintain healthy lifestyle. Healthy diet and regular exercises.   7) Maintain ideal weight.  - FUV: in 6 months or sooner if concerns arise.     Patient verbalized understanding of above. He will not hesitate to contact Division of Nephrology at [phone] with concerns/questions.    I spent 25 minutes in the professional and overall care of this patient.      Dilcia Barnard, CNP

## 2025-08-11 DIAGNOSIS — F41.1 GENERALIZED ANXIETY DISORDER: ICD-10-CM

## 2025-08-13 RX ORDER — CLONAZEPAM 0.5 MG/1
0.5 TABLET ORAL EVERY 12 HOURS PRN
Qty: 30 TABLET | Refills: 0 | Status: SHIPPED | OUTPATIENT
Start: 2025-08-13

## 2025-08-25 DIAGNOSIS — F90.9 ATTENTION DEFICIT HYPERACTIVITY DISORDER (ADHD), UNSPECIFIED ADHD TYPE: Primary | ICD-10-CM

## 2025-08-25 DIAGNOSIS — F41.1 GENERALIZED ANXIETY DISORDER: ICD-10-CM

## 2025-08-25 RX ORDER — DEXTROAMPHETAMINE SACCHARATE, AMPHETAMINE ASPARTATE MONOHYDRATE, DEXTROAMPHETAMINE SULFATE AND AMPHETAMINE SULFATE 5; 5; 5; 5 MG/1; MG/1; MG/1; MG/1
20 CAPSULE, EXTENDED RELEASE ORAL EVERY MORNING
Qty: 30 CAPSULE | Refills: 0 | Status: CANCELLED | OUTPATIENT
Start: 2025-08-25 | End: 2025-09-24

## 2025-08-27 DIAGNOSIS — F41.1 GENERALIZED ANXIETY DISORDER: ICD-10-CM

## 2025-08-27 RX ORDER — DEXTROAMPHETAMINE SACCHARATE, AMPHETAMINE ASPARTATE MONOHYDRATE, DEXTROAMPHETAMINE SULFATE AND AMPHETAMINE SULFATE 5; 5; 5; 5 MG/1; MG/1; MG/1; MG/1
20 CAPSULE, EXTENDED RELEASE ORAL EVERY MORNING
Qty: 7 CAPSULE | Refills: 0 | Status: SHIPPED | OUTPATIENT
Start: 2025-08-27 | End: 2025-09-03

## 2025-09-02 RX ORDER — DEXTROAMPHETAMINE SACCHARATE, AMPHETAMINE ASPARTATE MONOHYDRATE, DEXTROAMPHETAMINE SULFATE AND AMPHETAMINE SULFATE 2.5; 2.5; 2.5; 2.5 MG/1; MG/1; MG/1; MG/1
20 CAPSULE, EXTENDED RELEASE ORAL EVERY MORNING
COMMUNITY
End: 2025-09-02 | Stop reason: SDUPTHER

## 2025-09-02 RX ORDER — DEXTROAMPHETAMINE SACCHARATE, AMPHETAMINE ASPARTATE MONOHYDRATE, DEXTROAMPHETAMINE SULFATE AND AMPHETAMINE SULFATE 2.5; 2.5; 2.5; 2.5 MG/1; MG/1; MG/1; MG/1
20 CAPSULE, EXTENDED RELEASE ORAL EVERY MORNING
Qty: 60 CAPSULE | Refills: 0 | Status: SHIPPED | OUTPATIENT
Start: 2025-09-02

## 2025-09-05 ENCOUNTER — APPOINTMENT (OUTPATIENT)
Dept: NEPHROLOGY | Facility: CLINIC | Age: 75
End: 2025-09-05
Payer: MEDICARE

## 2025-09-05 ASSESSMENT — ENCOUNTER SYMPTOMS
HEMATOLOGIC/LYMPHATIC NEGATIVE: 1
ENDOCRINE NEGATIVE: 1
CARDIOVASCULAR NEGATIVE: 1
NEUROLOGICAL NEGATIVE: 1
RESPIRATORY NEGATIVE: 1
FATIGUE: 1
GASTROINTESTINAL NEGATIVE: 1
BACK PAIN: 0
PSYCHIATRIC NEGATIVE: 1

## 2025-10-23 ENCOUNTER — APPOINTMENT (OUTPATIENT)
Dept: PRIMARY CARE | Facility: CLINIC | Age: 75
End: 2025-10-23
Payer: MEDICARE

## 2026-01-26 ENCOUNTER — APPOINTMENT (OUTPATIENT)
Dept: DERMATOLOGY | Facility: CLINIC | Age: 76
End: 2026-01-26
Payer: MEDICARE

## 2026-02-06 ENCOUNTER — APPOINTMENT (OUTPATIENT)
Dept: NEPHROLOGY | Facility: CLINIC | Age: 76
End: 2026-02-06
Payer: MEDICARE

## (undated) DEVICE — TR BAND, RADIAL COMPRESSION, STANDARD, 24CM

## (undated) DEVICE — Device

## (undated) DEVICE — CUSTOM NAMIC STANDARD HEART CONVIENCE KIT W/ INTEGRATED COMPENSATOR MANIFOLD FOR UH/PARMA MEDICAL CENTER

## (undated) DEVICE — SHEATH, GLIDESHEATH, SLENDER, 6FR 10CM

## (undated) DEVICE — CATHETER, EXPO, MODEL-D, 6FR PIG 110CM

## (undated) DEVICE — CATHETER, OPTITORQUE, 6FR 110CM, TIGER RADIAL 4.0

## (undated) DEVICE — GUIDEWIRE, INQWIRE, 3MM J, .035 X 210CM, FIXED